# Patient Record
Sex: FEMALE | Race: WHITE | Employment: OTHER | URBAN - METROPOLITAN AREA
[De-identification: names, ages, dates, MRNs, and addresses within clinical notes are randomized per-mention and may not be internally consistent; named-entity substitution may affect disease eponyms.]

---

## 2017-01-01 ENCOUNTER — APPOINTMENT (OUTPATIENT)
Dept: GENERAL RADIOLOGY | Age: 82
DRG: 066 | End: 2017-01-01
Attending: EMERGENCY MEDICINE
Payer: MEDICARE

## 2017-01-01 ENCOUNTER — HOSPITAL ENCOUNTER (INPATIENT)
Age: 82
LOS: 3 days | Discharge: NURSING, REHAB OR LONG-TERM CARE | DRG: 066 | End: 2017-09-06
Attending: EMERGENCY MEDICINE | Admitting: HOSPITALIST
Payer: MEDICARE

## 2017-01-01 ENCOUNTER — PATIENT OUTREACH (OUTPATIENT)
Dept: CASE MANAGEMENT | Age: 82
End: 2017-01-01

## 2017-01-01 ENCOUNTER — PATIENT OUTREACH (OUTPATIENT)
Dept: FAMILY MEDICINE CLINIC | Age: 82
End: 2017-01-01

## 2017-01-01 ENCOUNTER — OFFICE VISIT (OUTPATIENT)
Dept: FAMILY MEDICINE CLINIC | Age: 82
End: 2017-01-01

## 2017-01-01 ENCOUNTER — HOSPITAL ENCOUNTER (OUTPATIENT)
Dept: LAB | Age: 82
Discharge: HOME | End: 2017-07-20
Payer: MEDICARE

## 2017-01-01 ENCOUNTER — HOSPITAL ENCOUNTER (OUTPATIENT)
Dept: PHYSICAL THERAPY | Age: 82
Discharge: HOME | End: 2017-09-12
Attending: INTERNAL MEDICINE
Payer: MEDICARE

## 2017-01-01 ENCOUNTER — APPOINTMENT (OUTPATIENT)
Dept: MRI IMAGING | Age: 82
DRG: 066 | End: 2017-01-01
Attending: HOSPITALIST
Payer: MEDICARE

## 2017-01-01 ENCOUNTER — APPOINTMENT (OUTPATIENT)
Dept: GENERAL RADIOLOGY | Age: 82
End: 2017-01-01
Attending: INTERNAL MEDICINE
Payer: MEDICARE

## 2017-01-01 ENCOUNTER — HOSPITAL ENCOUNTER (OUTPATIENT)
Dept: LAB | Age: 82
Discharge: HOME | End: 2017-07-21
Payer: MEDICARE

## 2017-01-01 ENCOUNTER — CLINICAL SUPPORT (OUTPATIENT)
Dept: FAMILY MEDICINE CLINIC | Age: 82
End: 2017-01-01

## 2017-01-01 ENCOUNTER — APPOINTMENT (OUTPATIENT)
Dept: CT IMAGING | Age: 82
DRG: 066 | End: 2017-01-01
Attending: EMERGENCY MEDICINE
Payer: MEDICARE

## 2017-01-01 ENCOUNTER — HOSPITAL ENCOUNTER (INPATIENT)
Age: 82
LOS: 16 days | Discharge: NURSING, REHAB OR LONG-TERM CARE | End: 2017-09-22
Attending: INTERNAL MEDICINE | Admitting: INTERNAL MEDICINE
Payer: MEDICARE

## 2017-01-01 ENCOUNTER — TELEPHONE (OUTPATIENT)
Dept: FAMILY MEDICINE CLINIC | Age: 82
End: 2017-01-01

## 2017-01-01 VITALS
TEMPERATURE: 95.9 F | WEIGHT: 160.2 LBS | HEIGHT: 65 IN | SYSTOLIC BLOOD PRESSURE: 143 MMHG | OXYGEN SATURATION: 95 % | BODY MASS INDEX: 26.69 KG/M2 | DIASTOLIC BLOOD PRESSURE: 69 MMHG | HEART RATE: 72 BPM | RESPIRATION RATE: 18 BRPM

## 2017-01-01 VITALS
HEART RATE: 81 BPM | RESPIRATION RATE: 16 BRPM | OXYGEN SATURATION: 95 % | DIASTOLIC BLOOD PRESSURE: 75 MMHG | WEIGHT: 157.4 LBS | BODY MASS INDEX: 26.23 KG/M2 | SYSTOLIC BLOOD PRESSURE: 155 MMHG | TEMPERATURE: 95.9 F | HEIGHT: 65 IN

## 2017-01-01 VITALS
HEIGHT: 64 IN | SYSTOLIC BLOOD PRESSURE: 121 MMHG | DIASTOLIC BLOOD PRESSURE: 56 MMHG | BODY MASS INDEX: 27.66 KG/M2 | HEART RATE: 71 BPM | RESPIRATION RATE: 15 BRPM | WEIGHT: 162 LBS | OXYGEN SATURATION: 92 % | TEMPERATURE: 98.8 F

## 2017-01-01 VITALS
RESPIRATION RATE: 16 BRPM | DIASTOLIC BLOOD PRESSURE: 63 MMHG | HEIGHT: 64 IN | BODY MASS INDEX: 26.8 KG/M2 | OXYGEN SATURATION: 94 % | TEMPERATURE: 97.9 F | WEIGHT: 157 LBS | HEART RATE: 74 BPM | SYSTOLIC BLOOD PRESSURE: 134 MMHG

## 2017-01-01 DIAGNOSIS — R53.81 DEBILITY: ICD-10-CM

## 2017-01-01 DIAGNOSIS — E78.00 PURE HYPERCHOLESTEROLEMIA: ICD-10-CM

## 2017-01-01 DIAGNOSIS — Z11.1 ENCOUNTER FOR PPD SKIN TEST READING: Primary | ICD-10-CM

## 2017-01-01 DIAGNOSIS — F01.50 VASCULAR DEMENTIA WITHOUT BEHAVIORAL DISTURBANCE (HCC): ICD-10-CM

## 2017-01-01 DIAGNOSIS — Z00.00 ROUTINE GENERAL MEDICAL EXAMINATION AT A HEALTH CARE FACILITY: Primary | ICD-10-CM

## 2017-01-01 DIAGNOSIS — F01.50 VASCULAR DEMENTIA WITHOUT BEHAVIORAL DISTURBANCE (HCC): Primary | ICD-10-CM

## 2017-01-01 DIAGNOSIS — I10 ESSENTIAL HYPERTENSION: ICD-10-CM

## 2017-01-01 DIAGNOSIS — I63.331 CEREBROVASCULAR ACCIDENT (CVA) DUE TO THROMBOSIS OF RIGHT POSTERIOR CEREBRAL ARTERY (HCC): Primary | ICD-10-CM

## 2017-01-01 DIAGNOSIS — R41.82 ALTERED MENTAL STATUS, UNSPECIFIED ALTERED MENTAL STATUS TYPE: ICD-10-CM

## 2017-01-01 DIAGNOSIS — I63.9 CEREBROVASCULAR ACCIDENT (CVA), UNSPECIFIED MECHANISM (HCC): ICD-10-CM

## 2017-01-01 DIAGNOSIS — K21.00 GASTROESOPHAGEAL REFLUX DISEASE WITH ESOPHAGITIS: ICD-10-CM

## 2017-01-01 DIAGNOSIS — N18.30 CKD (CHRONIC KIDNEY DISEASE) STAGE 3, GFR 30-59 ML/MIN (HCC): ICD-10-CM

## 2017-01-01 LAB
ABO + RH BLD: NORMAL
ALBUMIN SERPL BCP-MCNC: 3.2 G/DL (ref 3.4–5)
ALBUMIN SERPL-MCNC: 3 G/DL (ref 3.4–5)
ALBUMIN/GLOB SERPL: 0.8 {RATIO} (ref 0.8–1.7)
ALBUMIN/GLOB SERPL: 0.8 {RATIO} (ref 0.8–1.7)
ALP SERPL-CCNC: 79 U/L (ref 45–117)
ALP SERPL-CCNC: 82 U/L (ref 45–117)
ALT SERPL-CCNC: 21 U/L (ref 13–56)
ALT SERPL-CCNC: 21 U/L (ref 13–56)
AMPHET UR QL SCN: NEGATIVE
ANION GAP BLD CALC-SCNC: 13 MMOL/L (ref 10–20)
ANION GAP BLD CALC-SCNC: 6 MMOL/L (ref 3–18)
ANION GAP SERPL CALC-SCNC: 12 MMOL/L (ref 3–18)
ANION GAP SERPL CALC-SCNC: 12 MMOL/L (ref 3–18)
ANION GAP SERPL CALC-SCNC: 13 MMOL/L (ref 3–18)
ANION GAP SERPL CALC-SCNC: 14 MMOL/L (ref 3–18)
ANION GAP SERPL CALC-SCNC: 15 MMOL/L (ref 3–18)
ANION GAP SERPL CALC-SCNC: 8 MMOL/L (ref 3–18)
APPEARANCE UR: ABNORMAL
APPEARANCE UR: CLEAR
ASPIRIN TEST, ASPIRN: 350 ARU
AST SERPL W P-5'-P-CCNC: 17 U/L (ref 15–37)
AST SERPL-CCNC: 18 U/L (ref 15–37)
ATRIAL RATE: 63 BPM
BACTERIA URNS QL MICRO: ABNORMAL /HPF
BACTERIA URNS QL MICRO: ABNORMAL /HPF
BARBITURATES UR QL SCN: NEGATIVE
BASOPHILS # BLD AUTO: 0 K/UL (ref 0–0.06)
BASOPHILS # BLD: 0 K/UL (ref 0–0.06)
BASOPHILS # BLD: 0 K/UL (ref 0–0.06)
BASOPHILS # BLD: 1 % (ref 0–2)
BASOPHILS NFR BLD: 0 % (ref 0–2)
BASOPHILS NFR BLD: 0 % (ref 0–2)
BENZODIAZ UR QL: NEGATIVE
BILIRUB SERPL-MCNC: 0.3 MG/DL (ref 0.2–1)
BILIRUB SERPL-MCNC: 0.4 MG/DL (ref 0.2–1)
BILIRUB UR QL: NEGATIVE
BILIRUB UR QL: NEGATIVE
BLOOD GROUP ANTIBODIES SERPL: NORMAL
BUN BLD-MCNC: 31 MG/DL (ref 7–18)
BUN SERPL-MCNC: 27 MG/DL (ref 7–18)
BUN SERPL-MCNC: 29 MG/DL (ref 7–18)
BUN SERPL-MCNC: 35 MG/DL (ref 7–18)
BUN SERPL-MCNC: 37 MG/DL (ref 7–18)
BUN SERPL-MCNC: 39 MG/DL (ref 7–18)
BUN SERPL-MCNC: 51 MG/DL (ref 7–18)
BUN SERPL-MCNC: 57 MG/DL (ref 7–18)
BUN SERPL-MCNC: 59 MG/DL (ref 7–18)
BUN/CREAT SERPL: 17 (ref 12–20)
BUN/CREAT SERPL: 20 (ref 12–20)
BUN/CREAT SERPL: 21 (ref 12–20)
BUN/CREAT SERPL: 22 (ref 12–20)
BUN/CREAT SERPL: 22 (ref 12–20)
BUN/CREAT SERPL: 24 (ref 12–20)
BUN/CREAT SERPL: 24 (ref 12–20)
BUN/CREAT SERPL: 27 (ref 12–20)
BUN/CREAT SERPL: 30 (ref 12–20)
BUN/CREAT SERPL: 30 (ref 12–20)
CA-I BLD-MCNC: 1.06 MMOL/L (ref 1.12–1.32)
CALCIUM SERPL-MCNC: 7.6 MG/DL (ref 8.5–10.1)
CALCIUM SERPL-MCNC: 7.6 MG/DL (ref 8.5–10.1)
CALCIUM SERPL-MCNC: 7.7 MG/DL (ref 8.5–10.1)
CALCIUM SERPL-MCNC: 7.9 MG/DL (ref 8.5–10.1)
CALCIUM SERPL-MCNC: 8 MG/DL (ref 8.5–10.1)
CALCIUM SERPL-MCNC: 8.3 MG/DL (ref 8.5–10.1)
CALCIUM SERPL-MCNC: 8.3 MG/DL (ref 8.5–10.1)
CALCIUM SERPL-MCNC: 8.4 MG/DL (ref 8.5–10.1)
CALCIUM SERPL-MCNC: 8.6 MG/DL (ref 8.5–10.1)
CALCIUM SERPL-MCNC: 9 MG/DL (ref 8.5–10.1)
CALCULATED P AXIS, ECG09: 68 DEGREES
CALCULATED R AXIS, ECG10: 6 DEGREES
CALCULATED T AXIS, ECG11: 80 DEGREES
CANNABINOIDS UR QL SCN: NEGATIVE
CHLORIDE BLD-SCNC: 105 MMOL/L (ref 100–108)
CHLORIDE SERPL-SCNC: 101 MMOL/L (ref 100–108)
CHLORIDE SERPL-SCNC: 104 MMOL/L (ref 100–108)
CHLORIDE SERPL-SCNC: 105 MMOL/L (ref 100–108)
CHLORIDE SERPL-SCNC: 105 MMOL/L (ref 100–108)
CHLORIDE SERPL-SCNC: 106 MMOL/L (ref 100–108)
CHLORIDE SERPL-SCNC: 106 MMOL/L (ref 100–108)
CHLORIDE SERPL-SCNC: 107 MMOL/L (ref 100–108)
CHLORIDE SERPL-SCNC: 107 MMOL/L (ref 100–108)
CHLORIDE SERPL-SCNC: 110 MMOL/L (ref 100–108)
CHLORIDE SERPL-SCNC: 99 MMOL/L (ref 100–108)
CHOLEST SERPL-MCNC: 244 MG/DL
CHOLEST SERPL-MCNC: 251 MG/DL
CK MB CFR SERPL CALC: NORMAL % (ref 0–4)
CK MB SERPL-MCNC: <1 NG/ML (ref 5–25)
CK SERPL-CCNC: 89 U/L (ref 26–192)
CO2 BLD-SCNC: 27 MMOL/L (ref 19–24)
CO2 SERPL-SCNC: 20 MMOL/L (ref 21–32)
CO2 SERPL-SCNC: 20 MMOL/L (ref 21–32)
CO2 SERPL-SCNC: 21 MMOL/L (ref 21–32)
CO2 SERPL-SCNC: 22 MMOL/L (ref 21–32)
CO2 SERPL-SCNC: 23 MMOL/L (ref 21–32)
CO2 SERPL-SCNC: 23 MMOL/L (ref 21–32)
CO2 SERPL-SCNC: 25 MMOL/L (ref 21–32)
CO2 SERPL-SCNC: 25 MMOL/L (ref 21–32)
CO2 SERPL-SCNC: 26 MMOL/L (ref 21–32)
CO2 SERPL-SCNC: 28 MMOL/L (ref 21–32)
COCAINE UR QL SCN: NEGATIVE
COLOR UR: YELLOW
COLOR UR: YELLOW
CREAT SERPL-MCNC: 1.16 MG/DL (ref 0.6–1.3)
CREAT SERPL-MCNC: 1.21 MG/DL (ref 0.6–1.3)
CREAT SERPL-MCNC: 1.22 MG/DL (ref 0.6–1.3)
CREAT SERPL-MCNC: 1.25 MG/DL (ref 0.6–1.3)
CREAT SERPL-MCNC: 1.28 MG/DL (ref 0.6–1.3)
CREAT SERPL-MCNC: 1.31 MG/DL (ref 0.6–1.3)
CREAT SERPL-MCNC: 1.39 MG/DL (ref 0.6–1.3)
CREAT SERPL-MCNC: 2.13 MG/DL (ref 0.6–1.3)
CREAT SERPL-MCNC: 2.97 MG/DL (ref 0.6–1.3)
CREAT SERPL-MCNC: 3.4 MG/DL (ref 0.6–1.3)
CREAT UR-MCNC: 1.3 MG/DL (ref 0.6–1.3)
DIAGNOSIS, 93000: NORMAL
DIFFERENTIAL METHOD BLD: ABNORMAL
EOSINOPHIL # BLD: 0.2 K/UL (ref 0–0.4)
EOSINOPHIL # BLD: 0.2 K/UL (ref 0–0.4)
EOSINOPHIL # BLD: 0.3 K/UL (ref 0–0.4)
EOSINOPHIL NFR BLD: 1 % (ref 0–5)
EOSINOPHIL NFR BLD: 2 % (ref 0–5)
EOSINOPHIL NFR BLD: 4 % (ref 0–5)
EPITH CASTS URNS QL MICRO: ABNORMAL /LPF (ref 0–5)
EPITH CASTS URNS QL MICRO: ABNORMAL /LPF (ref 0–5)
ERYTHROCYTE [DISTWIDTH] IN BLOOD BY AUTOMATED COUNT: 13 % (ref 11.6–14.5)
ERYTHROCYTE [DISTWIDTH] IN BLOOD BY AUTOMATED COUNT: 13.1 % (ref 11.6–14.5)
ERYTHROCYTE [DISTWIDTH] IN BLOOD BY AUTOMATED COUNT: 13.3 % (ref 11.6–14.5)
ERYTHROCYTE [DISTWIDTH] IN BLOOD BY AUTOMATED COUNT: 13.4 % (ref 11.6–14.5)
EST. AVERAGE GLUCOSE BLD GHB EST-MCNC: 126 MG/DL
FIBRINOGEN PPP-MCNC: 231 MG/DL (ref 210–451)
GLOBULIN SER CALC-MCNC: 3.8 G/DL (ref 2–4)
GLOBULIN SER CALC-MCNC: 4 G/DL (ref 2–4)
GLUCOSE BLD STRIP.AUTO-MCNC: 105 MG/DL (ref 70–110)
GLUCOSE BLD STRIP.AUTO-MCNC: 109 MG/DL (ref 70–110)
GLUCOSE BLD STRIP.AUTO-MCNC: 110 MG/DL (ref 70–110)
GLUCOSE BLD STRIP.AUTO-MCNC: 110 MG/DL (ref 70–110)
GLUCOSE BLD STRIP.AUTO-MCNC: 112 MG/DL (ref 70–110)
GLUCOSE BLD STRIP.AUTO-MCNC: 114 MG/DL (ref 70–110)
GLUCOSE BLD STRIP.AUTO-MCNC: 115 MG/DL (ref 70–110)
GLUCOSE BLD STRIP.AUTO-MCNC: 116 MG/DL (ref 70–110)
GLUCOSE BLD STRIP.AUTO-MCNC: 118 MG/DL (ref 70–110)
GLUCOSE BLD STRIP.AUTO-MCNC: 120 MG/DL (ref 74–106)
GLUCOSE BLD STRIP.AUTO-MCNC: 124 MG/DL (ref 70–110)
GLUCOSE BLD STRIP.AUTO-MCNC: 124 MG/DL (ref 70–110)
GLUCOSE BLD STRIP.AUTO-MCNC: 125 MG/DL (ref 70–110)
GLUCOSE BLD STRIP.AUTO-MCNC: 131 MG/DL (ref 70–110)
GLUCOSE BLD STRIP.AUTO-MCNC: 132 MG/DL (ref 70–110)
GLUCOSE BLD STRIP.AUTO-MCNC: 133 MG/DL (ref 70–110)
GLUCOSE BLD STRIP.AUTO-MCNC: 135 MG/DL (ref 70–110)
GLUCOSE BLD STRIP.AUTO-MCNC: 137 MG/DL (ref 70–110)
GLUCOSE BLD STRIP.AUTO-MCNC: 137 MG/DL (ref 70–110)
GLUCOSE BLD STRIP.AUTO-MCNC: 138 MG/DL (ref 70–110)
GLUCOSE BLD STRIP.AUTO-MCNC: 159 MG/DL (ref 70–110)
GLUCOSE BLD STRIP.AUTO-MCNC: 94 MG/DL (ref 70–110)
GLUCOSE SERPL-MCNC: 101 MG/DL (ref 74–99)
GLUCOSE SERPL-MCNC: 104 MG/DL (ref 74–99)
GLUCOSE SERPL-MCNC: 105 MG/DL (ref 74–99)
GLUCOSE SERPL-MCNC: 110 MG/DL (ref 74–99)
GLUCOSE SERPL-MCNC: 114 MG/DL (ref 74–99)
GLUCOSE SERPL-MCNC: 120 MG/DL (ref 74–99)
GLUCOSE SERPL-MCNC: 126 MG/DL (ref 74–99)
GLUCOSE SERPL-MCNC: 127 MG/DL (ref 74–99)
GLUCOSE SERPL-MCNC: 129 MG/DL (ref 74–99)
GLUCOSE SERPL-MCNC: 93 MG/DL (ref 74–99)
GLUCOSE UR STRIP.AUTO-MCNC: NEGATIVE MG/DL
GLUCOSE UR STRIP.AUTO-MCNC: NEGATIVE MG/DL
HBA1C MFR BLD: 6 % (ref 4.2–5.6)
HCT VFR BLD AUTO: 34.6 % (ref 35–45)
HCT VFR BLD AUTO: 36.2 % (ref 35–45)
HCT VFR BLD AUTO: 36.8 % (ref 35–45)
HCT VFR BLD AUTO: 38.1 % (ref 35–45)
HCT VFR BLD CALC: 37 % (ref 36–49)
HDLC SERPL-MCNC: 42 MG/DL (ref 40–60)
HDLC SERPL-MCNC: 46 MG/DL (ref 40–60)
HDLC SERPL: 5.3 {RATIO} (ref 0–5)
HDLC SERPL: 6 {RATIO} (ref 0–5)
HDSCOM,HDSCOM: NORMAL
HGB BLD-MCNC: 11.5 G/DL (ref 12–16)
HGB BLD-MCNC: 11.6 G/DL (ref 12–16)
HGB BLD-MCNC: 11.9 G/DL (ref 12–16)
HGB BLD-MCNC: 12.6 G/DL (ref 12–16)
HGB BLD-MCNC: 13 G/DL (ref 12–16)
HGB UR QL STRIP: NEGATIVE
HGB UR QL STRIP: NEGATIVE
INR PPP: 1.2 (ref 0.8–1.2)
KETONES UR QL STRIP.AUTO: NEGATIVE MG/DL
KETONES UR QL STRIP.AUTO: NEGATIVE MG/DL
LDLC SERPL CALC-MCNC: 149.8 MG/DL (ref 0–100)
LDLC SERPL CALC-MCNC: 151.4 MG/DL (ref 0–100)
LEUKOCYTE ESTERASE UR QL STRIP.AUTO: ABNORMAL
LEUKOCYTE ESTERASE UR QL STRIP.AUTO: ABNORMAL
LIPID PROFILE,FLP: ABNORMAL
LIPID PROFILE,FLP: ABNORMAL
LYMPHOCYTES # BLD AUTO: 35 % (ref 21–52)
LYMPHOCYTES # BLD: 1.9 K/UL (ref 0.9–3.6)
LYMPHOCYTES # BLD: 2 K/UL (ref 0.9–3.6)
LYMPHOCYTES # BLD: 2.7 K/UL (ref 0.9–3.6)
LYMPHOCYTES NFR BLD: 16 % (ref 21–52)
LYMPHOCYTES NFR BLD: 17 % (ref 21–52)
MCH RBC QN AUTO: 28.8 PG (ref 24–34)
MCH RBC QN AUTO: 29.2 PG (ref 24–34)
MCH RBC QN AUTO: 29.7 PG (ref 24–34)
MCH RBC QN AUTO: 29.8 PG (ref 24–34)
MCHC RBC AUTO-ENTMCNC: 31.8 G/DL (ref 31–37)
MCHC RBC AUTO-ENTMCNC: 32.3 G/DL (ref 31–37)
MCHC RBC AUTO-ENTMCNC: 33.5 G/DL (ref 31–37)
MCHC RBC AUTO-ENTMCNC: 34.1 G/DL (ref 31–37)
MCV RBC AUTO: 87.4 FL (ref 74–97)
MCV RBC AUTO: 88.7 FL (ref 74–97)
MCV RBC AUTO: 90.4 FL (ref 74–97)
MCV RBC AUTO: 90.7 FL (ref 74–97)
METHADONE UR QL: NEGATIVE
MM INDURATION POC: 0 MM (ref 0–5)
MONOCYTES # BLD: 0.5 K/UL (ref 0.05–1.2)
MONOCYTES # BLD: 0.5 K/UL (ref 0.05–1.2)
MONOCYTES # BLD: 1 K/UL (ref 0.05–1.2)
MONOCYTES NFR BLD AUTO: 6 % (ref 3–10)
MONOCYTES NFR BLD: 5 % (ref 3–10)
MONOCYTES NFR BLD: 9 % (ref 3–10)
NEUTS SEG # BLD: 4.2 K/UL (ref 1.8–8)
NEUTS SEG # BLD: 8.4 K/UL (ref 1.8–8)
NEUTS SEG # BLD: 8.8 K/UL (ref 1.8–8)
NEUTS SEG NFR BLD AUTO: 54 % (ref 40–73)
NEUTS SEG NFR BLD: 73 % (ref 40–73)
NEUTS SEG NFR BLD: 77 % (ref 40–73)
NITRITE UR QL STRIP.AUTO: NEGATIVE
NITRITE UR QL STRIP.AUTO: POSITIVE
OPIATES UR QL: NEGATIVE
P-R INTERVAL, ECG05: 196 MS
P2Y12 PLT RESPONSE,PPPR: 326 PRU (ref 194–418)
PCP UR QL: NEGATIVE
PH UR STRIP: 5 [PH] (ref 5–8)
PH UR STRIP: 5.5 [PH] (ref 5–8)
PLATELET # BLD AUTO: 242 K/UL (ref 135–420)
PLATELET # BLD AUTO: 274 K/UL (ref 135–420)
PLATELET # BLD AUTO: 282 K/UL (ref 135–420)
PLATELET # BLD AUTO: 388 K/UL (ref 135–420)
PMV BLD AUTO: 9 FL (ref 9.2–11.8)
PMV BLD AUTO: 9.4 FL (ref 9.2–11.8)
PMV BLD AUTO: 9.7 FL (ref 9.2–11.8)
PMV BLD AUTO: 9.9 FL (ref 9.2–11.8)
POTASSIUM BLD-SCNC: 4 MMOL/L (ref 3.5–5.5)
POTASSIUM SERPL-SCNC: 3.7 MMOL/L (ref 3.5–5.5)
POTASSIUM SERPL-SCNC: 3.7 MMOL/L (ref 3.5–5.5)
POTASSIUM SERPL-SCNC: 3.8 MMOL/L (ref 3.5–5.5)
POTASSIUM SERPL-SCNC: 3.9 MMOL/L (ref 3.5–5.5)
POTASSIUM SERPL-SCNC: 4 MMOL/L (ref 3.5–5.5)
POTASSIUM SERPL-SCNC: 4 MMOL/L (ref 3.5–5.5)
POTASSIUM SERPL-SCNC: 4.5 MMOL/L (ref 3.5–5.5)
POTASSIUM SERPL-SCNC: 4.5 MMOL/L (ref 3.5–5.5)
POTASSIUM SERPL-SCNC: 4.7 MMOL/L (ref 3.5–5.5)
POTASSIUM SERPL-SCNC: 5.2 MMOL/L (ref 3.5–5.5)
PPD POC: NEGATIVE NEGATIVE
PROT SERPL-MCNC: 6.8 G/DL (ref 6.4–8.2)
PROT SERPL-MCNC: 7.2 G/DL (ref 6.4–8.2)
PROT UR STRIP-MCNC: NEGATIVE MG/DL
PROT UR STRIP-MCNC: NEGATIVE MG/DL
PROTHROMBIN TIME: 14.7 SEC (ref 11.5–15.2)
Q-T INTERVAL, ECG07: 464 MS
QRS DURATION, ECG06: 80 MS
QTC CALCULATION (BEZET), ECG08: 474 MS
RBC # BLD AUTO: 3.9 M/UL (ref 4.2–5.3)
RBC # BLD AUTO: 3.99 M/UL (ref 4.2–5.3)
RBC # BLD AUTO: 4.07 M/UL (ref 4.2–5.3)
RBC # BLD AUTO: 4.36 M/UL (ref 4.2–5.3)
RBC #/AREA URNS HPF: ABNORMAL /HPF (ref 0–5)
RBC #/AREA URNS HPF: ABNORMAL /HPF (ref 0–5)
SODIUM BLD-SCNC: 140 MMOL/L (ref 136–145)
SODIUM SERPL-SCNC: 134 MMOL/L (ref 136–145)
SODIUM SERPL-SCNC: 136 MMOL/L (ref 136–145)
SODIUM SERPL-SCNC: 137 MMOL/L (ref 136–145)
SODIUM SERPL-SCNC: 137 MMOL/L (ref 136–145)
SODIUM SERPL-SCNC: 138 MMOL/L (ref 136–145)
SODIUM SERPL-SCNC: 139 MMOL/L (ref 136–145)
SODIUM SERPL-SCNC: 140 MMOL/L (ref 136–145)
SODIUM SERPL-SCNC: 141 MMOL/L (ref 136–145)
SODIUM SERPL-SCNC: 142 MMOL/L (ref 136–145)
SODIUM SERPL-SCNC: 143 MMOL/L (ref 136–145)
SP GR UR REFRACTOMETRY: 1.02 (ref 1–1.03)
SP GR UR REFRACTOMETRY: 1.02 (ref 1–1.03)
SPECIMEN EXP DATE BLD: NORMAL
THROMBIN TIME: 15.7 SECS (ref 13.8–18.2)
TRIGL SERPL-MCNC: 233 MG/DL (ref ?–150)
TRIGL SERPL-MCNC: 296 MG/DL (ref ?–150)
TROPONIN I SERPL-MCNC: <0.02 NG/ML (ref 0–0.04)
TSH SERPL DL<=0.05 MIU/L-ACNC: 2.23 UIU/ML (ref 0.36–3.74)
UROBILINOGEN UR QL STRIP.AUTO: 0.2 EU/DL (ref 0.2–1)
UROBILINOGEN UR QL STRIP.AUTO: 0.2 EU/DL (ref 0.2–1)
VENTRICULAR RATE, ECG03: 63 BPM
VLDLC SERPL CALC-MCNC: 46.6 MG/DL
VLDLC SERPL CALC-MCNC: 59.2 MG/DL
WBC # BLD AUTO: 11.5 K/UL (ref 4.6–13.2)
WBC # BLD AUTO: 11.6 K/UL (ref 4.6–13.2)
WBC # BLD AUTO: 7.2 K/UL (ref 4.6–13.2)
WBC # BLD AUTO: 7.7 K/UL (ref 4.6–13.2)
WBC URNS QL MICRO: ABNORMAL /HPF (ref 0–4)
WBC URNS QL MICRO: ABNORMAL /HPF (ref 0–4)

## 2017-01-01 PROCEDURE — 36415 COLL VENOUS BLD VENIPUNCTURE: CPT | Performed by: INTERNAL MEDICINE

## 2017-01-01 PROCEDURE — 77030020257 HC SOL INJ SOD CL 0.45% 1000ML BG

## 2017-01-01 PROCEDURE — 74011250637 HC RX REV CODE- 250/637: Performed by: INTERNAL MEDICINE

## 2017-01-01 PROCEDURE — 80047 BASIC METABLC PNL IONIZED CA: CPT

## 2017-01-01 PROCEDURE — 93306 TTE W/DOPPLER COMPLETE: CPT

## 2017-01-01 PROCEDURE — 80053 COMPREHEN METABOLIC PANEL: CPT | Performed by: EMERGENCY MEDICINE

## 2017-01-01 PROCEDURE — 80048 BASIC METABOLIC PNL TOTAL CA: CPT | Performed by: INTERNAL MEDICINE

## 2017-01-01 PROCEDURE — 82550 ASSAY OF CK (CPK): CPT | Performed by: EMERGENCY MEDICINE

## 2017-01-01 PROCEDURE — 74011250636 HC RX REV CODE- 250/636: Performed by: INTERNAL MEDICINE

## 2017-01-01 PROCEDURE — 74011250636 HC RX REV CODE- 250/636: Performed by: HOSPITALIST

## 2017-01-01 PROCEDURE — 92611 MOTION FLUOROSCOPY/SWALLOW: CPT

## 2017-01-01 PROCEDURE — 97116 GAIT TRAINING THERAPY: CPT

## 2017-01-01 PROCEDURE — 80061 LIPID PANEL: CPT | Performed by: HOSPITALIST

## 2017-01-01 PROCEDURE — 97165 OT EVAL LOW COMPLEX 30 MIN: CPT

## 2017-01-01 PROCEDURE — 85027 COMPLETE CBC AUTOMATED: CPT | Performed by: EMERGENCY MEDICINE

## 2017-01-01 PROCEDURE — 97530 THERAPEUTIC ACTIVITIES: CPT

## 2017-01-01 PROCEDURE — 94761 N-INVAS EAR/PLS OXIMETRY MLT: CPT

## 2017-01-01 PROCEDURE — 86900 BLOOD TYPING SEROLOGIC ABO: CPT | Performed by: EMERGENCY MEDICINE

## 2017-01-01 PROCEDURE — 82962 GLUCOSE BLOOD TEST: CPT

## 2017-01-01 PROCEDURE — 93880 EXTRACRANIAL BILAT STUDY: CPT

## 2017-01-01 PROCEDURE — 36415 COLL VENOUS BLD VENIPUNCTURE: CPT | Performed by: FAMILY MEDICINE

## 2017-01-01 PROCEDURE — 85610 PROTHROMBIN TIME: CPT | Performed by: EMERGENCY MEDICINE

## 2017-01-01 PROCEDURE — 74011250637 HC RX REV CODE- 250/637: Performed by: EMERGENCY MEDICINE

## 2017-01-01 PROCEDURE — 93005 ELECTROCARDIOGRAM TRACING: CPT

## 2017-01-01 PROCEDURE — 85576 BLOOD PLATELET AGGREGATION: CPT | Performed by: EMERGENCY MEDICINE

## 2017-01-01 PROCEDURE — 99285 EMERGENCY DEPT VISIT HI MDM: CPT

## 2017-01-01 PROCEDURE — 65660000000 HC RM CCU STEPDOWN

## 2017-01-01 PROCEDURE — 97535 SELF CARE MNGMENT TRAINING: CPT

## 2017-01-01 PROCEDURE — 65270000029 HC RM PRIVATE

## 2017-01-01 PROCEDURE — 85025 COMPLETE CBC W/AUTO DIFF WBC: CPT | Performed by: FAMILY MEDICINE

## 2017-01-01 PROCEDURE — 81001 URINALYSIS AUTO W/SCOPE: CPT | Performed by: FAMILY MEDICINE

## 2017-01-01 PROCEDURE — 80061 LIPID PANEL: CPT | Performed by: FAMILY MEDICINE

## 2017-01-01 PROCEDURE — 74011000258 HC RX REV CODE- 258: Performed by: INTERNAL MEDICINE

## 2017-01-01 PROCEDURE — G8997 SWALLOW GOAL STATUS: HCPCS

## 2017-01-01 PROCEDURE — 74011000255 HC RX REV CODE- 255: Performed by: INTERNAL MEDICINE

## 2017-01-01 PROCEDURE — 76450000000

## 2017-01-01 PROCEDURE — G8998 SWALLOW D/C STATUS: HCPCS

## 2017-01-01 PROCEDURE — 74011250637 HC RX REV CODE- 250/637: Performed by: HOSPITALIST

## 2017-01-01 PROCEDURE — 74011000258 HC RX REV CODE- 258: Performed by: HOSPITALIST

## 2017-01-01 PROCEDURE — 84443 ASSAY THYROID STIM HORMONE: CPT | Performed by: FAMILY MEDICINE

## 2017-01-01 PROCEDURE — 92610 EVALUATE SWALLOWING FUNCTION: CPT

## 2017-01-01 PROCEDURE — 74230 X-RAY XM SWLNG FUNCJ C+: CPT

## 2017-01-01 PROCEDURE — 85025 COMPLETE CBC W/AUTO DIFF WBC: CPT | Performed by: INTERNAL MEDICINE

## 2017-01-01 PROCEDURE — 81001 URINALYSIS AUTO W/SCOPE: CPT | Performed by: EMERGENCY MEDICINE

## 2017-01-01 PROCEDURE — 70450 CT HEAD/BRAIN W/O DYE: CPT

## 2017-01-01 PROCEDURE — 97162 PT EVAL MOD COMPLEX 30 MIN: CPT

## 2017-01-01 PROCEDURE — 85670 THROMBIN TIME PLASMA: CPT | Performed by: EMERGENCY MEDICINE

## 2017-01-01 PROCEDURE — 70544 MR ANGIOGRAPHY HEAD W/O DYE: CPT

## 2017-01-01 PROCEDURE — 83036 HEMOGLOBIN GLYCOSYLATED A1C: CPT | Performed by: HOSPITALIST

## 2017-01-01 PROCEDURE — 80053 COMPREHEN METABOLIC PANEL: CPT | Performed by: FAMILY MEDICINE

## 2017-01-01 PROCEDURE — 80307 DRUG TEST PRSMV CHEM ANLYZR: CPT | Performed by: EMERGENCY MEDICINE

## 2017-01-01 PROCEDURE — 85384 FIBRINOGEN ACTIVITY: CPT | Performed by: EMERGENCY MEDICINE

## 2017-01-01 PROCEDURE — 51798 US URINE CAPACITY MEASURE: CPT

## 2017-01-01 PROCEDURE — 71010 XR CHEST PORT: CPT

## 2017-01-01 PROCEDURE — 77030020253 HC SOL INJ D545NS .05 DEX .45 SAL

## 2017-01-01 PROCEDURE — G8996 SWALLOW CURRENT STATUS: HCPCS

## 2017-01-01 RX ORDER — ATORVASTATIN CALCIUM 40 MG/1
80 TABLET, FILM COATED ORAL
Status: DISCONTINUED | OUTPATIENT
Start: 2017-01-01 | End: 2017-01-01 | Stop reason: HOSPADM

## 2017-01-01 RX ORDER — METHYLPHENIDATE HYDROCHLORIDE 10 MG/1
5 TABLET ORAL
Status: COMPLETED | OUTPATIENT
Start: 2017-01-01 | End: 2017-01-01

## 2017-01-01 RX ORDER — ASPIRIN 325 MG
325 TABLET ORAL DAILY
Status: DISCONTINUED | OUTPATIENT
Start: 2017-01-01 | End: 2017-01-01 | Stop reason: HOSPADM

## 2017-01-01 RX ORDER — HYDROCHLOROTHIAZIDE 25 MG/1
25 TABLET ORAL DAILY
Qty: 30 TAB | Refills: 0 | Status: SHIPPED
Start: 2017-01-01

## 2017-01-01 RX ORDER — INSULIN LISPRO 100 [IU]/ML
INJECTION, SOLUTION INTRAVENOUS; SUBCUTANEOUS
Qty: 1 VIAL | Refills: 0 | Status: SHIPPED
Start: 2017-01-01

## 2017-01-01 RX ORDER — DEXTROSE 50 % IN WATER (D50W) INTRAVENOUS SYRINGE
25-50 AS NEEDED
Status: DISCONTINUED | OUTPATIENT
Start: 2017-01-01 | End: 2017-01-01 | Stop reason: HOSPADM

## 2017-01-01 RX ORDER — DEXTROSE MONOHYDRATE AND SODIUM CHLORIDE 5; .45 G/100ML; G/100ML
50 INJECTION, SOLUTION INTRAVENOUS CONTINUOUS
Status: DISCONTINUED | OUTPATIENT
Start: 2017-01-01 | End: 2017-01-01

## 2017-01-01 RX ORDER — HYDROCHLOROTHIAZIDE 25 MG/1
25 TABLET ORAL DAILY
Status: DISCONTINUED | OUTPATIENT
Start: 2017-01-01 | End: 2017-01-01

## 2017-01-01 RX ORDER — METHYLPHENIDATE HYDROCHLORIDE 5 MG/1
2.5 TABLET ORAL
Status: DISCONTINUED | OUTPATIENT
Start: 2017-01-01 | End: 2017-01-01

## 2017-01-01 RX ORDER — LISINOPRIL 5 MG/1
5 TABLET ORAL DAILY
Status: DISCONTINUED | OUTPATIENT
Start: 2017-01-01 | End: 2017-01-01

## 2017-01-01 RX ORDER — ATORVASTATIN CALCIUM 80 MG/1
80 TABLET, FILM COATED ORAL
Qty: 30 TAB | Refills: 0 | Status: SHIPPED | OUTPATIENT
Start: 2017-01-01 | End: 2017-01-01

## 2017-01-01 RX ORDER — INSULIN LISPRO 100 [IU]/ML
INJECTION, SOLUTION INTRAVENOUS; SUBCUTANEOUS
Status: DISCONTINUED | OUTPATIENT
Start: 2017-01-01 | End: 2017-01-01 | Stop reason: HOSPADM

## 2017-01-01 RX ORDER — ACETAMINOPHEN 325 MG/1
650 TABLET ORAL
Status: DISCONTINUED | OUTPATIENT
Start: 2017-01-01 | End: 2017-01-01 | Stop reason: HOSPADM

## 2017-01-01 RX ORDER — ASPIRIN 325 MG
325 TABLET ORAL DAILY
Qty: 60 TAB | Refills: 0 | Status: SHIPPED | OUTPATIENT
Start: 2017-01-01

## 2017-01-01 RX ORDER — HYDRALAZINE HYDROCHLORIDE 20 MG/ML
10 INJECTION INTRAMUSCULAR; INTRAVENOUS
Status: DISCONTINUED | OUTPATIENT
Start: 2017-01-01 | End: 2017-01-01 | Stop reason: HOSPADM

## 2017-01-01 RX ORDER — CLOPIDOGREL BISULFATE 75 MG/1
75 TABLET ORAL DAILY
Status: DISCONTINUED | OUTPATIENT
Start: 2017-01-01 | End: 2017-01-01 | Stop reason: HOSPADM

## 2017-01-01 RX ORDER — INSULIN LISPRO 100 [IU]/ML
INJECTION, SOLUTION INTRAVENOUS; SUBCUTANEOUS
Status: DISCONTINUED | OUTPATIENT
Start: 2017-01-01 | End: 2017-01-01

## 2017-01-01 RX ORDER — ATORVASTATIN CALCIUM 80 MG/1
80 TABLET, FILM COATED ORAL
Qty: 60 TAB | Refills: 0 | Status: SHIPPED | OUTPATIENT
Start: 2017-01-01

## 2017-01-01 RX ORDER — ONDANSETRON 2 MG/ML
4 INJECTION INTRAMUSCULAR; INTRAVENOUS
Status: DISCONTINUED | OUTPATIENT
Start: 2017-01-01 | End: 2017-01-01 | Stop reason: HOSPADM

## 2017-01-01 RX ORDER — DEXTROSE MONOHYDRATE AND SODIUM CHLORIDE 5; .45 G/100ML; G/100ML
100 INJECTION, SOLUTION INTRAVENOUS CONTINUOUS
Status: CANCELLED | OUTPATIENT
Start: 2017-01-01

## 2017-01-01 RX ORDER — VALSARTAN AND HYDROCHLOROTHIAZIDE 80; 12.5 MG/1; MG/1
1 TABLET, FILM COATED ORAL DAILY
Qty: 90 TAB | Refills: 4 | Status: SHIPPED | OUTPATIENT
Start: 2017-01-01 | End: 2017-01-01

## 2017-01-01 RX ORDER — CLOPIDOGREL BISULFATE 75 MG/1
75 TABLET ORAL DAILY
Status: DISCONTINUED | OUTPATIENT
Start: 2017-01-01 | End: 2017-01-01

## 2017-01-01 RX ORDER — ASPIRIN 325 MG
325 TABLET ORAL
Status: COMPLETED | OUTPATIENT
Start: 2017-01-01 | End: 2017-01-01

## 2017-01-01 RX ORDER — LISINOPRIL 5 MG/1
5 TABLET ORAL DAILY
Qty: 30 TAB | Refills: 0 | Status: SHIPPED
Start: 2017-01-01

## 2017-01-01 RX ORDER — ENOXAPARIN SODIUM 100 MG/ML
30 INJECTION SUBCUTANEOUS EVERY 24 HOURS
Status: COMPLETED | OUTPATIENT
Start: 2017-01-01 | End: 2017-01-01

## 2017-01-01 RX ORDER — MAGNESIUM SULFATE 100 %
4 CRYSTALS MISCELLANEOUS AS NEEDED
Status: DISCONTINUED | OUTPATIENT
Start: 2017-01-01 | End: 2017-01-01

## 2017-01-01 RX ORDER — HYDROCHLOROTHIAZIDE 25 MG/1
25 TABLET ORAL DAILY
Status: DISCONTINUED | OUTPATIENT
Start: 2017-01-01 | End: 2017-01-01 | Stop reason: HOSPADM

## 2017-01-01 RX ORDER — DEXTROSE MONOHYDRATE AND SODIUM CHLORIDE 5; .45 G/100ML; G/100ML
100 INJECTION, SOLUTION INTRAVENOUS CONTINUOUS
Status: DISCONTINUED | OUTPATIENT
Start: 2017-01-01 | End: 2017-01-01

## 2017-01-01 RX ORDER — LISINOPRIL 5 MG/1
5 TABLET ORAL DAILY
Status: DISCONTINUED | OUTPATIENT
Start: 2017-01-01 | End: 2017-01-01 | Stop reason: HOSPADM

## 2017-01-01 RX ORDER — CLOPIDOGREL BISULFATE 75 MG/1
75 TABLET ORAL DAILY
Qty: 30 TAB | Refills: 2 | Status: SHIPPED
Start: 2017-01-01 | End: 2017-01-01

## 2017-01-01 RX ORDER — AMLODIPINE BESYLATE 10 MG/1
10 TABLET ORAL DAILY
Status: DISCONTINUED | OUTPATIENT
Start: 2017-01-01 | End: 2017-01-01

## 2017-01-01 RX ORDER — ACETAMINOPHEN 325 MG/1
650 TABLET ORAL
Qty: 100 TAB | Refills: 0 | Status: SHIPPED | OUTPATIENT
Start: 2017-01-01

## 2017-01-01 RX ORDER — ACETAMINOPHEN 325 MG/1
650 TABLET ORAL
Status: DISCONTINUED | OUTPATIENT
Start: 2017-01-01 | End: 2017-01-01

## 2017-01-01 RX ORDER — ASPIRIN 325 MG
325 TABLET ORAL DAILY
Status: DISCONTINUED | OUTPATIENT
Start: 2017-01-01 | End: 2017-01-01

## 2017-01-01 RX ORDER — CLOPIDOGREL BISULFATE 75 MG/1
75 TABLET ORAL DAILY
Qty: 60 TAB | Refills: 0 | Status: SHIPPED | OUTPATIENT
Start: 2017-01-01

## 2017-01-01 RX ORDER — ACETAMINOPHEN 650 MG/1
650 SUPPOSITORY RECTAL
Status: DISCONTINUED | OUTPATIENT
Start: 2017-01-01 | End: 2017-01-01 | Stop reason: HOSPADM

## 2017-01-01 RX ORDER — ENOXAPARIN SODIUM 100 MG/ML
30 INJECTION SUBCUTANEOUS EVERY 24 HOURS
Status: DISCONTINUED | OUTPATIENT
Start: 2017-01-01 | End: 2017-01-01 | Stop reason: HOSPADM

## 2017-01-01 RX ORDER — ATORVASTATIN CALCIUM 40 MG/1
80 TABLET, FILM COATED ORAL
Status: DISCONTINUED | OUTPATIENT
Start: 2017-01-01 | End: 2017-01-01

## 2017-01-01 RX ORDER — ASPIRIN 325 MG
325 TABLET ORAL DAILY
Qty: 30 TAB | Refills: 0 | Status: SHIPPED | OUTPATIENT
Start: 2017-01-01 | End: 2017-01-01

## 2017-01-01 RX ORDER — MORPHINE SULFATE 20 MG/ML
2.5 SOLUTION ORAL
Status: DISCONTINUED | OUTPATIENT
Start: 2017-01-01 | End: 2017-01-01 | Stop reason: HOSPADM

## 2017-01-01 RX ORDER — MAGNESIUM SULFATE 100 %
4 CRYSTALS MISCELLANEOUS AS NEEDED
Status: DISCONTINUED | OUTPATIENT
Start: 2017-01-01 | End: 2017-01-01 | Stop reason: HOSPADM

## 2017-01-01 RX ORDER — DEXTROSE 50 % IN WATER (D50W) INTRAVENOUS SYRINGE
25-50 AS NEEDED
Status: DISCONTINUED | OUTPATIENT
Start: 2017-01-01 | End: 2017-01-01

## 2017-01-01 RX ADMIN — ACETAMINOPHEN 650 MG: 650 SOLUTION ORAL at 16:50

## 2017-01-01 RX ADMIN — ACETAMINOPHEN 650 MG: 325 TABLET, FILM COATED ORAL at 21:21

## 2017-01-01 RX ADMIN — ASPIRIN 325 MG ORAL TABLET 325 MG: 325 PILL ORAL at 09:59

## 2017-01-01 RX ADMIN — CLOPIDOGREL BISULFATE 75 MG: 75 TABLET, FILM COATED ORAL at 09:11

## 2017-01-01 RX ADMIN — ATORVASTATIN CALCIUM 80 MG: 40 TABLET, FILM COATED ORAL at 22:19

## 2017-01-01 RX ADMIN — ENOXAPARIN SODIUM 30 MG: 30 INJECTION SUBCUTANEOUS at 18:01

## 2017-01-01 RX ADMIN — ATORVASTATIN CALCIUM 80 MG: 40 TABLET, FILM COATED ORAL at 21:19

## 2017-01-01 RX ADMIN — ASPIRIN 325 MG ORAL TABLET 325 MG: 325 PILL ORAL at 09:12

## 2017-01-01 RX ADMIN — ACETAMINOPHEN 650 MG: 650 SOLUTION ORAL at 16:40

## 2017-01-01 RX ADMIN — ATORVASTATIN CALCIUM 80 MG: 40 TABLET, FILM COATED ORAL at 22:40

## 2017-01-01 RX ADMIN — CLOPIDOGREL BISULFATE 75 MG: 75 TABLET, FILM COATED ORAL at 09:19

## 2017-01-01 RX ADMIN — ACETAMINOPHEN 650 MG: 325 TABLET, FILM COATED ORAL at 18:02

## 2017-01-01 RX ADMIN — ATORVASTATIN CALCIUM 80 MG: 40 TABLET, FILM COATED ORAL at 22:00

## 2017-01-01 RX ADMIN — CLOPIDOGREL BISULFATE 75 MG: 75 TABLET, FILM COATED ORAL at 09:00

## 2017-01-01 RX ADMIN — ACETAMINOPHEN 650 MG: 650 SOLUTION ORAL at 16:21

## 2017-01-01 RX ADMIN — ACETAMINOPHEN 650 MG: 325 TABLET, FILM COATED ORAL at 21:51

## 2017-01-01 RX ADMIN — ATORVASTATIN CALCIUM 80 MG: 40 TABLET, FILM COATED ORAL at 21:24

## 2017-01-01 RX ADMIN — ASPIRIN 325 MG ORAL TABLET 325 MG: 325 PILL ORAL at 09:00

## 2017-01-01 RX ADMIN — ENOXAPARIN SODIUM 30 MG: 30 INJECTION SUBCUTANEOUS at 17:36

## 2017-01-01 RX ADMIN — ACETAMINOPHEN 650 MG: 325 TABLET, FILM COATED ORAL at 16:55

## 2017-01-01 RX ADMIN — ACETAMINOPHEN 650 MG: 325 TABLET, FILM COATED ORAL at 22:07

## 2017-01-01 RX ADMIN — ACETAMINOPHEN 650 MG: 325 TABLET, FILM COATED ORAL at 12:22

## 2017-01-01 RX ADMIN — ATORVASTATIN CALCIUM 80 MG: 40 TABLET, FILM COATED ORAL at 21:09

## 2017-01-01 RX ADMIN — METHYLPHENIDATE HYDROCHLORIDE 5 MG: 10 TABLET ORAL at 10:35

## 2017-01-01 RX ADMIN — CLOPIDOGREL BISULFATE 75 MG: 75 TABLET, FILM COATED ORAL at 10:34

## 2017-01-01 RX ADMIN — ATORVASTATIN CALCIUM 80 MG: 40 TABLET, FILM COATED ORAL at 21:57

## 2017-01-01 RX ADMIN — ATORVASTATIN CALCIUM 80 MG: 40 TABLET, FILM COATED ORAL at 21:21

## 2017-01-01 RX ADMIN — DEXTROSE MONOHYDRATE AND SODIUM CHLORIDE 100 ML/HR: 5; .45 INJECTION, SOLUTION INTRAVENOUS at 18:53

## 2017-01-01 RX ADMIN — DEXTROSE MONOHYDRATE AND SODIUM CHLORIDE 50 ML/HR: 5; .45 INJECTION, SOLUTION INTRAVENOUS at 17:19

## 2017-01-01 RX ADMIN — CLOPIDOGREL BISULFATE 75 MG: 75 TABLET, FILM COATED ORAL at 08:03

## 2017-01-01 RX ADMIN — ENOXAPARIN SODIUM 30 MG: 30 INJECTION SUBCUTANEOUS at 17:32

## 2017-01-01 RX ADMIN — BARIUM SULFATE 30 ML: 0.81 POWDER, FOR SUSPENSION ORAL at 09:50

## 2017-01-01 RX ADMIN — CLOPIDOGREL BISULFATE 75 MG: 75 TABLET ORAL at 09:14

## 2017-01-01 RX ADMIN — ATORVASTATIN CALCIUM 80 MG: 40 TABLET, FILM COATED ORAL at 21:41

## 2017-01-01 RX ADMIN — ASPIRIN 325 MG ORAL TABLET 325 MG: 325 PILL ORAL at 12:15

## 2017-01-01 RX ADMIN — ASPIRIN 325 MG ORAL TABLET 325 MG: 325 PILL ORAL at 09:14

## 2017-01-01 RX ADMIN — ACETAMINOPHEN 650 MG: 325 TABLET, FILM COATED ORAL at 21:24

## 2017-01-01 RX ADMIN — ACETAMINOPHEN 650 MG: 325 TABLET, FILM COATED ORAL at 08:11

## 2017-01-01 RX ADMIN — ATORVASTATIN CALCIUM 80 MG: 40 TABLET, FILM COATED ORAL at 21:05

## 2017-01-01 RX ADMIN — CLOPIDOGREL BISULFATE 75 MG: 75 TABLET, FILM COATED ORAL at 10:16

## 2017-01-01 RX ADMIN — ACETAMINOPHEN 650 MG: 325 TABLET, FILM COATED ORAL at 07:57

## 2017-01-01 RX ADMIN — CLOPIDOGREL BISULFATE 75 MG: 75 TABLET, FILM COATED ORAL at 08:35

## 2017-01-01 RX ADMIN — CLOPIDOGREL BISULFATE 75 MG: 75 TABLET, FILM COATED ORAL at 09:31

## 2017-01-01 RX ADMIN — HYDROCODONE BITARTRATE AND ACETAMINOPHEN 5 MG: 7.5; 325 TABLET ORAL at 09:00

## 2017-01-01 RX ADMIN — ACETAMINOPHEN 650 MG: 325 TABLET, FILM COATED ORAL at 19:33

## 2017-01-01 RX ADMIN — ACETAMINOPHEN 650 MG: 325 TABLET, FILM COATED ORAL at 07:01

## 2017-01-01 RX ADMIN — MORPHINE SULFATE 2.6 MG: 20 SOLUTION ORAL at 16:11

## 2017-01-01 RX ADMIN — ACETAMINOPHEN 650 MG: 650 SOLUTION ORAL at 09:11

## 2017-01-01 RX ADMIN — ENOXAPARIN SODIUM 30 MG: 30 INJECTION SUBCUTANEOUS at 18:03

## 2017-01-01 RX ADMIN — ASPIRIN 325 MG ORAL TABLET 325 MG: 325 PILL ORAL at 10:02

## 2017-01-01 RX ADMIN — ASPIRIN 325 MG ORAL TABLET 325 MG: 325 PILL ORAL at 09:31

## 2017-01-01 RX ADMIN — BARIUM SULFATE 15 ML: 400 PASTE ORAL at 09:50

## 2017-01-01 RX ADMIN — ACETAMINOPHEN 650 MG: 650 SOLUTION ORAL at 17:02

## 2017-01-01 RX ADMIN — ASPIRIN 325 MG ORAL TABLET 325 MG: 325 PILL ORAL at 10:16

## 2017-01-01 RX ADMIN — ACETAMINOPHEN 650 MG: 325 TABLET, FILM COATED ORAL at 10:02

## 2017-01-01 RX ADMIN — CLOPIDOGREL BISULFATE 75 MG: 75 TABLET ORAL at 08:04

## 2017-01-01 RX ADMIN — ASPIRIN 325 MG ORAL TABLET 325 MG: 325 PILL ORAL at 09:18

## 2017-01-01 RX ADMIN — ASPIRIN 325 MG ORAL TABLET 325 MG: 325 PILL ORAL at 08:03

## 2017-01-01 RX ADMIN — ACETAMINOPHEN 650 MG: 325 TABLET, FILM COATED ORAL at 16:32

## 2017-01-01 RX ADMIN — ACETAMINOPHEN 650 MG: 650 SOLUTION ORAL at 12:08

## 2017-01-01 RX ADMIN — LISINOPRIL 5 MG: 5 TABLET ORAL at 09:59

## 2017-01-01 RX ADMIN — ASPIRIN 325 MG ORAL TABLET 325 MG: 325 PILL ORAL at 08:11

## 2017-01-01 RX ADMIN — ASPIRIN 325 MG ORAL TABLET 325 MG: 325 PILL ORAL at 08:13

## 2017-01-01 RX ADMIN — ACETAMINOPHEN 650 MG: 325 TABLET, FILM COATED ORAL at 18:03

## 2017-01-01 RX ADMIN — ATORVASTATIN CALCIUM 80 MG: 40 TABLET, FILM COATED ORAL at 22:43

## 2017-01-01 RX ADMIN — ATORVASTATIN CALCIUM 80 MG: 40 TABLET, FILM COATED ORAL at 22:08

## 2017-01-01 RX ADMIN — HYDROCHLOROTHIAZIDE 25 MG: 25 TABLET ORAL at 09:59

## 2017-01-01 RX ADMIN — LISINOPRIL 5 MG: 5 TABLET ORAL at 13:00

## 2017-01-01 RX ADMIN — ATORVASTATIN CALCIUM 80 MG: 40 TABLET, FILM COATED ORAL at 22:09

## 2017-01-01 RX ADMIN — ASPIRIN 325 MG ORAL TABLET 325 MG: 325 PILL ORAL at 10:35

## 2017-01-01 RX ADMIN — ASPIRIN 325 MG ORAL TABLET 325 MG: 325 PILL ORAL at 08:04

## 2017-01-01 RX ADMIN — ENOXAPARIN SODIUM 30 MG: 30 INJECTION SUBCUTANEOUS at 17:30

## 2017-01-01 RX ADMIN — ENOXAPARIN SODIUM 30 MG: 30 INJECTION SUBCUTANEOUS at 17:24

## 2017-01-01 RX ADMIN — ENOXAPARIN SODIUM 30 MG: 30 INJECTION SUBCUTANEOUS at 17:01

## 2017-01-01 RX ADMIN — ASPIRIN 325 MG ORAL TABLET 325 MG: 325 PILL ORAL at 10:26

## 2017-01-01 RX ADMIN — ACETAMINOPHEN 650 MG: 325 TABLET, FILM COATED ORAL at 21:57

## 2017-01-01 RX ADMIN — DEXTROSE MONOHYDRATE AND SODIUM CHLORIDE 100 ML/HR: 5; .45 INJECTION, SOLUTION INTRAVENOUS at 10:30

## 2017-01-01 RX ADMIN — ATORVASTATIN CALCIUM 80 MG: 40 TABLET, FILM COATED ORAL at 22:20

## 2017-01-01 RX ADMIN — ATORVASTATIN CALCIUM 80 MG: 40 TABLET, FILM COATED ORAL at 21:51

## 2017-01-01 RX ADMIN — ENOXAPARIN SODIUM 30 MG: 30 INJECTION SUBCUTANEOUS at 16:26

## 2017-01-01 RX ADMIN — ASPIRIN 325 MG ORAL TABLET 325 MG: 325 PILL ORAL at 12:08

## 2017-01-01 RX ADMIN — ACETAMINOPHEN 650 MG: 650 SOLUTION ORAL at 11:15

## 2017-01-01 RX ADMIN — ACETAMINOPHEN 650 MG: 325 TABLET, FILM COATED ORAL at 16:22

## 2017-01-01 RX ADMIN — CLOPIDOGREL BISULFATE 75 MG: 75 TABLET, FILM COATED ORAL at 08:11

## 2017-01-01 RX ADMIN — CLOPIDOGREL BISULFATE 75 MG: 75 TABLET, FILM COATED ORAL at 09:18

## 2017-01-01 RX ADMIN — ENOXAPARIN SODIUM 30 MG: 30 INJECTION SUBCUTANEOUS at 16:32

## 2017-01-01 RX ADMIN — ATORVASTATIN CALCIUM 80 MG: 40 TABLET, FILM COATED ORAL at 21:00

## 2017-01-01 RX ADMIN — ACETAMINOPHEN 650 MG: 650 SOLUTION ORAL at 09:30

## 2017-01-01 RX ADMIN — ACETAMINOPHEN 650 MG: 650 SOLUTION ORAL at 08:35

## 2017-01-01 RX ADMIN — ACETAMINOPHEN 650 MG: 650 SOLUTION ORAL at 08:00

## 2017-01-01 RX ADMIN — ENOXAPARIN SODIUM 30 MG: 30 INJECTION SUBCUTANEOUS at 19:46

## 2017-01-01 RX ADMIN — CLOPIDOGREL BISULFATE 75 MG: 75 TABLET, FILM COATED ORAL at 10:02

## 2017-01-01 RX ADMIN — ENOXAPARIN SODIUM 30 MG: 30 INJECTION SUBCUTANEOUS at 17:17

## 2017-01-01 RX ADMIN — ACETAMINOPHEN 650 MG: 650 SOLUTION ORAL at 12:52

## 2017-01-01 RX ADMIN — ASPIRIN 325 MG ORAL TABLET 325 MG: 325 PILL ORAL at 08:35

## 2017-01-01 RX ADMIN — ATORVASTATIN CALCIUM 80 MG: 40 TABLET, FILM COATED ORAL at 21:12

## 2017-01-01 RX ADMIN — ATORVASTATIN CALCIUM 80 MG: 40 TABLET, FILM COATED ORAL at 22:16

## 2017-01-01 RX ADMIN — BARIUM SULFATE 15 ML: 400 SUSPENSION ORAL at 09:50

## 2017-01-01 RX ADMIN — ASPIRIN 325 MG ORAL TABLET 325 MG: 325 PILL ORAL at 09:19

## 2017-01-01 RX ADMIN — HYDROCHLOROTHIAZIDE 25 MG: 25 TABLET ORAL at 09:00

## 2017-01-01 RX ADMIN — BARIUM SULFATE 700 MG: 700 TABLET ORAL at 09:50

## 2017-01-01 RX ADMIN — ACETAMINOPHEN 650 MG: 325 TABLET, FILM COATED ORAL at 18:23

## 2017-01-01 RX ADMIN — ASPIRIN 325 MG ORAL TABLET 325 MG: 325 PILL ORAL at 09:11

## 2017-01-25 NOTE — MR AVS SNAPSHOT
Visit Information Date & Time Provider Department Dept. Phone Encounter #  
 1/25/2017 11:00 AM [place] [address] [address] [phone] [id number] Follow-up Instructions Return in about 6 months (around 7/25/2017) for physical, labs prior, EKG next visit. Follow-up and Disposition History Your Appointments 1/25/2017 11:00 AM  
[place] [address] [address]) Appt Note: Follow up  
 [address] [place] [address]  
  
   
 [address] [facility] [address] Upcoming Health Maintenance Date Due INFLUENZA AGE 9 TO ADULT 8/1/2016 MEDICARE YEARLY EXAM 7/26/2017 GLAUCOMA SCREENING Q2Y 7/25/2018 DTaP/Tdap/Td series (2 - Td) 11/8/2022 Allergies as of 1/25/2017  Review Complete On: 1/25/2017 By: Janine Valentino DO Severity Noted Reaction Type Reactions Pcn [Penicillins]  11/08/2012    Unable to Obtain Current Immunizations  Reviewed on 1/25/2017 Name Date Influenza High Dose Vaccine PF 1/25/2017, 10/15/2015 Influenza Vaccine Split 10/8/2012 Pneumococcal Conjugate (PCV-13) 7/25/2016 Pneumococcal Vaccine (Unspecified Type) 11/8/2012 TDAP Vaccine 11/8/2012 Zoster Vaccine, Live 7/18/2013 Reviewed by Janine Valentino DO on 1/25/2017 at 10:18 AM  
You Were Diagnosed With   
  
 Codes Comments Vascular dementia without behavioral disturbance    -  Primary ICD-10-CM: F01.50 ICD-9-CM: 290.40 Essential hypertension     ICD-10-CM: I10 
ICD-9-CM: 401.9 Gastroesophageal reflux disease with esophagitis     ICD-10-CM: K21.0 ICD-9-CM: 530.11 Pure hypercholesterolemia     ICD-10-CM: E78.00 ICD-9-CM: 272.0 Vitals BP Pulse Temp Resp Height(growth percentile) Weight(growth percentile)  155/75 (BP 1 Location: Right arm, BP Patient Position: Sitting) 81 95.9 °F (35.5 °C) (Oral) 16 5' 5\" (1.651 m) 157 lb 6.4 oz (71.4 kg) SpO2 BMI OB Status Smoking Status 95% 26.19 kg/m2 Hysterectomy Never Smoker BMI and BSA Data Body Mass Index Body Surface Area  
 26.19 kg/m 2 1.81 m 2 Preferred Pharmacy Pharmacy Name Phone [address], [facility] [phone] Your Updated Medication List  
  
   
This list is accurate as of: 1/25/17 10:25 AM.  Always use your most recent med list.  
  
  
  
  
 valsartan-hydroCHLOROthiazide 80-12.5 mg per tablet Commonly known as:  DIOVAN HCT Take 1 Tab by mouth daily. Follow-up Instructions Return in about 6 months (around 7/25/2017) for physical, labs prior, EKG next visit. To-Do List   
 Around 07/24/2017 Lab:  CBC WITH AUTOMATED DIFF Around 07/24/2017 Lab:  LIPID PANEL Around 07/24/2017 Lab:  METABOLIC PANEL, COMPREHENSIVE Around 07/24/2017 Lab:  TSH 3RD GENERATION Around 07/24/2017 Lab:  URINALYSIS W/ RFLX MICROSCOPIC Introducing [facility]! Radames Montes introduces Justinmind patient portal. Now you can access parts of your medical record, email your doctor's office, and request medication refills online. 1. In your internet browser, go to [email or web address] 2. Click on the First Time User? Click Here link in the Sign In box. You will see the New Member Sign Up page. 3. Enter your Justinmind Access Code exactly as it appears below. You will not need to use this code after youve completed the sign-up process. If you do not sign up before the expiration date, you must request a new code. · Justinmind Access Code: [email or web address] Expires: 4/25/2017 10:25 AM 
 
4. Enter the last four digits of your Social Security Number (xxxx) and Date of Birth (mm/dd/yyyy) as indicated and click Submit. You will be taken to the next sign-up page. 5. Create a Eastide ID. This will be your Eastide login ID and cannot be changed, so think of one that is secure and easy to remember. 6. Create a Eastide password. You can change your password at any time. 7. Enter your Password Reset Question and Answer. This can be used at a later time if you forget your password. 8. Enter your e-mail address. You will receive e-mail notification when new information is available in [address] 9. Click Sign Up. You can now view and download portions of your medical record. 10. Click the Download Summary menu link to download a portable copy of your medical information. If you have questions, please visit the Frequently Asked Questions section of the Eastide website. Remember, Eastide is NOT to be used for urgent needs. For medical emergencies, dial 911. Now available from your iPhone and Android! Please provide this summary of care documentation to your next provider. Your primary care clinician is listed as [id number] [facility]. If you have any questions after today's visit, please call [phone].

## 2017-01-25 NOTE — PROGRESS NOTES
Etelvina Acevedo is a 80 y.o.  female and presents with    Chief Complaint   Patient presents with    Hypertension    GERD    Cholesterol Problem    Dementia           Subjective:    Cardiovascular Review:  The patient has hypertension and hyperlipidemia. Diet and Lifestyle: not attempting to follow a low fat, low cholesterol diet, not attempting to follow a low sodium diet  Home BP Monitoring: is not measured at home. Pertinent ROS: taking medications as instructed, no medication side effects noted, no TIA's, no chest pain on exertion, no dyspnea on exertion, no swelling of ankles. Dementia ongonw    gerd ongoing      Additional Concerns:          Patient Active Problem List    Diagnosis Date Noted    Advance directive in chart 07/25/2016    Dementia 10/03/2013    HTN (hypertension) 11/08/2012    GERD (gastroesophageal reflux disease) 11/08/2012    Pure hypercholesterolemia 11/08/2012     Current Outpatient Prescriptions   Medication Sig Dispense Refill    valsartan-hydrochlorothiazide (DIOVAN HCT) 80-12.5 mg per tablet Take 1 Tab by mouth daily. 90 Tab 4     Allergies   Allergen Reactions    Pcn [Penicillins] Unable to Obtain     Past Medical History   Diagnosis Date    Advance directive in chart 7/25/2016    Gastrointestinal disorder     GERD (gastroesophageal reflux disease)     GERD (gastroesophageal reflux disease) 11/8/2012    HTN (hypertension) 11/8/2012    Hypercholesterolemia     Hypertension     Pure hypercholesterolemia 11/8/2012     Past Surgical History   Procedure Laterality Date    Hx orthopaedic      Hx gyn       Family History   Problem Relation Age of Onset    Cancer Father      Social History   Substance Use Topics    Smoking status: Never Smoker    Smokeless tobacco: Never Used    Alcohol use No       ROS       All other systems reviewed and are negative.       Objective:  Vitals:    01/25/17 1006   BP: 155/75   Pulse: 81   Resp: 16   Temp: 95.9 °F (35.5 °C) TempSrc: Oral   SpO2: 95%   Weight: 157 lb 6.4 oz (71.4 kg)   Height: 5' 5\" (1.651 m)   PainSc:   0 - No pain                 alert, well appearing, and in no distress, oriented to person, place, and time and normal appearing weight  Chest - clear to auscultation, no wheezes, rales or rhonchi, symmetric air entry  Heart - normal rate, regular rhythm, normal S1, S2, no murmurs, rubs, clicks or gallops  Abdomen - soft, nontender, nondistended, no masses or organomegaly        LABS     TESTS      Assessment/Plan:    Hypertension - stable  Hyperlipidemia - stable  gerd stable  Dementia - ongoing      Lab review: labs are reviewed, up to date and normal       I have discussed the diagnosis with the patient and the intended plan as seen in the above orders. The patient has received an after-visit summary and questions were answered concerning future plans. I have discussed medication side effects and warnings with the patient as well. I have reviewed the plan of care with the patient, accepted their input and they are in agreement with the treatment goals.      Follow-up Disposition: Not on File

## 2017-01-25 NOTE — PROGRESS NOTES
1. Have you been to the ER, urgent care clinic since your last visit? Hospitalized since your last visit? No    2. Have you seen or consulted any other health care providers outside of the [address] [place] [address] since your last visit? Include any pap smears or colon screening.  No

## 2017-07-26 NOTE — PROGRESS NOTES
Chanelle Zuñiga is a 80 y.o. female presents today for her annual medicare wellness. She would also like to discuss obtaining a TB test today. Pt is in Room # 6        Learning Assessment (baseline): Completed  Depression Screening: Completed  Fall Risk Screening: Completed  Abuse screening: Completed  ADL Assessment: Completed    1. Have you been to the ER, urgent care clinic since your last visit? Hospitalized since your last visit? No    2. Have you seen or consulted any other health care providers outside of the [address] [place] [address] since your last visit? Include any pap smears or colon screening. Yes When: 3 months ago Where: [facility]- eye doctor Reason for visit: eye exam     PPD Placement note  Chanelle Zuñiga, 80 y.o. female is here today for placement of PPD test  Reason for PPD test: facility/home  Pt taken PPD test before: yes  Verified in allergy area and with patient that they are not allergic to the products PPD is made of (Phenol or Tween). Yes  Is patient taking any oral or IV steroid medication now or have they taken it in the last month? no  Has the patient ever received the BCG vaccine?: no  Has the patient been in recent contact with anyone known or suspected of having active TB disease?: no       Date of exposure (if applicable): na       Name of person they were exposed to (if applicable): na  Patient's Country of origin?: [place]  O: Alert and oriented in NAD. P:  PPD placed on 7/26/2017. Patient advised to return for reading within 48-72 hours.

## 2017-07-26 NOTE — MR AVS SNAPSHOT
Visit Information Date & Time Provider Department Dept. Phone Encounter #  
 7/26/2017 10:00 [place] [address] [address] [phone] [id number] Follow-up Instructions Return in about 2 days (around 7/28/2017) for ppd read only otherwise 6 mo. Upcoming Health Maintenance Date Due  
 MEDICARE YEARLY EXAM 7/26/2017 INFLUENZA AGE 9 TO ADULT 8/1/2017 GLAUCOMA SCREENING Q2Y 7/25/2018 DTaP/Tdap/Td series (2 - Td) 11/8/2022 Allergies as of 7/26/2017  Review Complete On: 7/26/2017 By: Felipa Tanner DO Severity Noted Reaction Type Reactions Pcn [Penicillins]  11/08/2012    Unable to Obtain Current Immunizations  Reviewed on 1/25/2017 Name Date Influenza High Dose Vaccine PF 1/25/2017, 10/15/2015 Influenza Vaccine Split 10/8/2012 Pneumococcal Conjugate (PCV-13) 7/25/2016 TB Skin Test (PPD) Intradermal  Incomplete TDAP Vaccine 11/8/2012 ZZZ-RETIRED (DO NOT USE) Pneumococcal Vaccine (Unspecified Type) 11/8/2012 Zoster Vaccine, Live 7/18/2013 Not reviewed this visit You Were Diagnosed With   
  
 Codes Comments Routine general medical examination at a health care facility    -  Primary ICD-10-CM: Z00.00 ICD-9-CM: V70.0 Essential hypertension     ICD-10-CM: I10 
ICD-9-CM: 401.9 Gastroesophageal reflux disease with esophagitis     ICD-10-CM: K21.0 ICD-9-CM: 530.11 Pure hypercholesterolemia     ICD-10-CM: E78.00 ICD-9-CM: 272.0 Vascular dementia without behavioral disturbance     ICD-10-CM: F01.50 ICD-9-CM: 290.40 Vitals BP Pulse Temp Resp Height(growth percentile) Weight(growth percentile) 143/69 (BP 1 Location: Right arm, BP Patient Position: Sitting) 72 95.9 °F (35.5 °C) (Oral) 18 5' 5\" (1.651 m) 160 lb 3.2 oz (72.7 kg) SpO2 BMI OB Status Smoking Status 95% 26.66 kg/m2 Hysterectomy Never Smoker BMI and BSA Data Body Mass Index Body Surface Area 26.66 kg/m 2 1.83 m 2 Preferred Pharmacy Pharmacy Name Phone [address], [facility] [phone] Your Updated Medication List  
  
   
This list is accurate as of: 7/26/17 11:06 AM.  Always use your most recent med list.  
  
  
  
  
 valsartan-hydroCHLOROthiazide 80-12.5 mg per tablet Commonly known as:  DIOVAN HCT Take 1 Tab by mouth daily. Prescriptions Sent to Pharmacy Refills  
 valsartan-hydroCHLOROthiazide (DIOVAN HCT) 80-12.5 mg per tablet 4 Sig: Take 1 Tab by mouth daily. Class: Normal  
 Pharmacy: [address], [address] [place] [address] #: [phone] Route: Oral  
  
We Performed the Following AMB POC EKG ROUTINE W/ 12 LEADS, INTER & REP [60406 CPT(R)] AMB POC TUBERCULOSIS, INTRADERMAL (SKIN TEST) [71635 CPT(R)] Follow-up Instructions Return in about 2 days (around 7/28/2017) for ppd read only otherwise 6 mo. Introducing [facility]! Emani Pugh introduces [email or web address] patient portal. Now you can access parts of your medical record, email your doctor's office, and request medication refills online. 1. In your internet browser, go to [email or web address] 2. Click on the First Time User? Click Here link in the Sign In box. You will see the New Member Sign Up page. 3. Enter your [email or web address] Access Code exactly as it appears below. You will not need to use this code after youve completed the sign-up process. If you do not sign up before the expiration date, you must request a new code. · [email or web address] Access Code: [email or web address] Expires: 10/18/2017  8:53 AM 
 
4. Enter the last four digits of your Social Security Number (xxxx) and Date of Birth (mm/dd/yyyy) as indicated and click Submit. You will be taken to the next sign-up page. 5. Create a [email or web address] ID.  This will be your [email or web address] login ID and cannot be changed, so think of one that is secure and easy to remember. 6. Create a StrikeIron password. You can change your password at any time. 7. Enter your Password Reset Question and Answer. This can be used at a later time if you forget your password. 8. Enter your e-mail address. You will receive e-mail notification when new information is available in [address] 9. Click Sign Up. You can now view and download portions of your medical record. 10. Click the Download Summary menu link to download a portable copy of your medical information. If you have questions, please visit the Frequently Asked Questions section of the StrikeIron website. Remember, StrikeIron is NOT to be used for urgent needs. For medical emergencies, dial 911. Now available from your iPhone and Android! Please provide this summary of care documentation to your next provider. Your primary care clinician is listed as [phone] [facility]. If you have any questions after today's visit, please call [phone].

## 2017-07-26 NOTE — PROGRESS NOTES
THE SUBSEQUENT MEDICARE ANNUAL WELLNESS VISIT PROGRESS NOTES    This is a Subsequent Medicare Annual Wellness Visit providing Personalized Prevention Plan Services (PPPS) (Performed 12 months after initial AWV and PPPS )    I have reviewed the patient's medical history in detail and updated the computerized patient record. Donnell Marroquin is a 80 y.o.  female and presents for an subsequent annual wellness exam     Patient Active Problem List    Diagnosis Date Noted    Advance directive in chart 07/25/2016    Dementia 10/03/2013    HTN (hypertension) 11/08/2012    GERD (gastroesophageal reflux disease) 11/08/2012    Pure hypercholesterolemia 11/08/2012     Current Outpatient Prescriptions   Medication Sig Dispense Refill    valsartan-hydrochlorothiazide (DIOVAN HCT) 80-12.5 mg per tablet Take 1 Tab by mouth daily. 90 Tab 4     Allergies   Allergen Reactions    Pcn [Penicillins] Unable to Obtain     Past Medical History:   Diagnosis Date    Advance directive in chart 7/25/2016    Gastrointestinal disorder     GERD (gastroesophageal reflux disease)     GERD (gastroesophageal reflux disease) 11/8/2012    HTN (hypertension) 11/8/2012    Hypercholesterolemia     Hypertension     Pure hypercholesterolemia 11/8/2012     Past Surgical History:   Procedure Laterality Date    HX GYN      HX ORTHOPAEDIC       Family History   Problem Relation Age of Onset    Cancer Father      Social History   Substance Use Topics    Smoking status: Never Smoker    Smokeless tobacco: Never Used    Alcohol use No         ROS       All other systems reviewed and are negative.       History     Past Medical History:   Diagnosis Date    Advance directive in chart 7/25/2016    Gastrointestinal disorder     GERD (gastroesophageal reflux disease)     GERD (gastroesophageal reflux disease) 11/8/2012    HTN (hypertension) 11/8/2012    Hypercholesterolemia     Hypertension     Pure hypercholesterolemia 11/8/2012 Past Surgical History:   Procedure Laterality Date    HX GYN      HX ORTHOPAEDIC       Current Outpatient Prescriptions   Medication Sig Dispense Refill    valsartan-hydrochlorothiazide (DIOVAN HCT) 80-12.5 mg per tablet Take 1 Tab by mouth daily. 90 Tab 4     Allergies   Allergen Reactions    Pcn [Penicillins] Unable to Obtain     Family History   Problem Relation Age of Onset    Cancer Father      Social History   Substance Use Topics    Smoking status: Never Smoker    Smokeless tobacco: Never Used    Alcohol use No     Patient Active Problem List   Diagnosis Code    HTN (hypertension) I10    GERD (gastroesophageal reflux disease) K21.9    Pure hypercholesterolemia E78.00    Dementia F03.90    Advance directive in chart Z78.9       Health Maintenance History  Immunizations reviewed, dtap utd  , pneumovax utd , flu utd , zoster utd   Colonoscopy: utd ,   Chest CT : na ,  Eye exam: utd   Mammo refused   [place] refused     Health Care Directive or Living Will: yes    Depression Risk Factor Screening:      Patient Health Questionnaire (PHQ-2)   Over the last 2 weeks, how often have you been bothered by any of the following problems? · Little interest or pleasure in doing things? · Not at all. [0]  · Feeling down, depressed, or hopeless? · Not at all. [0]    Total Score: 0/6  PHQ-2 Assessment Scoring:   A score of 2 or more requires further screening with the PHQ-9    Alcohol Risk Factor Screening:     Women: On any occasion during the past 3 months, have you had more than 3 drinks containing alcohol? Do you average more than 7 drinks per week? Men: On any occasion during the past 3 months, have you had more than 4 drinks containing alcohol? Do you average more than 14 drinks per week? Functional Ability and Level of Safety:     Hearing Loss    mild    Activities of Daily Living   Self-care.    Requires assistance with: no ADLs    Fall Risk   No fall risk factors    Abuse Screen None      Examination   Physical Examination  Vitals:    07/26/17 1031   BP: 143/69   Pulse: 72   Resp: 18   Temp: 95.9 °F (35.5 °C)   TempSrc: Oral   SpO2: 95%   Weight: 160 lb 3.2 oz (72.7 kg)   Height: 5' 5\" (1.651 m)   PainSc:   0 - No pain     Body mass index is 26.66 kg/(m^2). Evaluation of Cognitive Function:  Mood/affect:appropriate   Appearance: well groomed   Family member/caregiver input: son here today     alert, well appearing, and in no distress, oriented to person, place, and time and normal appearing weight    Patient Care Team:  Ciara Alba, DO as PCP - General (Family Practice)  AUTUMN Naik (Orthopedic Surgery)  Liu Bowman MD (Gastroenterology)  Dung Orr MD (Orthopedic Surgery)    Advice/Referrals/Counseling/Plan:   Education and counseling provided:  Are appropriate based on today's review and evaluation  End-of-Life planning (with patient's consent)  Include in education list (weight loss, physical activity, smoking cessation, fall prevention, and nutrition)  current treatment plan is effective, no change in therapy. I have discussed the diagnosis with the patient and the intended plan as seen in the above orders. The patient has received an after-visit summary and questions were answered concerning future plans. I have discussed medication side effects and warnings with the patient as well. I have reviewed the plan of care with the patient, accepted their input and they are in agreement with the treatment goals. Follow-up Disposition: Not on File    _____________________________________________________________    Problem Assessment    for treatment of   Chief Complaint   Patient presents with    Hypertension    GERD    Cholesterol Problem    Dementia    Other     nursing home physical         SUBJECTIVE      Cardiovascular Review:  The patient has hypertension and hyperlipidemia.   Diet and Lifestyle: not attempting to follow a low fat, low cholesterol diet, not attempting to follow a low sodium diet  Home BP Monitoring: is not measured at home. Pertinent ROS: taking medications as instructed, no medication side effects noted, no TIA's, no chest pain on exertion, no dyspnea on exertion, no swelling of ankles. gerd stable on meds       Additional Concerns: dementia ongoing    Also wants NH admission physical done today req PPD        Visit Vitals    /69 (BP 1 Location: Right arm, BP Patient Position: Sitting)    Pulse 72    Temp 95.9 °F (35.5 °C) (Oral)    Resp 18    Ht 5' 5\" (1.651 m)    Wt 160 lb 3.2 oz (72.7 kg)    SpO2 95%    BMI 26.66 kg/m2     General:  Alert, cooperative, no distress, appears stated age. Head:  Normocephalic, without obvious abnormality, atraumatic. Eyes:  Conjunctivae/corneas clear. PERRL, EOMs intact. Fundi benign. Ears:  Normal TMs and external ear canals both ears. Nose: Nares normal. Septum midline. Mucosa normal. No drainage or sinus tenderness. Throat: Lips, mucosa, and tongue normal. Teeth and gums normal.   Neck: Supple, symmetrical, trachea midline, no adenopathy, thyroid: no enlargement/tenderness/nodules, no carotid bruit and no JVD. Back:   Symmetric, no curvature. ROM normal. No CVA tenderness. Lungs:   Clear to auscultation bilaterally. Chest wall:  No tenderness or deformity. Heart:  Regular rate and rhythm, S1, S2 normal, no murmur, click, rub or gallop. Breast Exam:  No tenderness, masses, or nipple abnormality. Abdomen:   Soft, non-tender. Bowel sounds normal. No masses,  No organomegaly. Genitalia:  Normal female without lesion, discharge or tenderness. Rectal:  Normal tone,  no masses or tenderness  Guaiac negative stool. Extremities: Extremities normal, atraumatic, no cyanosis or edema. Pulses: 2+ and symmetric all extremities. Skin: Skin color, texture, turgor normal. No rashes or lesions.    Lymph nodes: Cervical, supraclavicular, and axillary nodes normal. Neurologic: CNII-XII intact. Normal strength, sensation and reflexes throughout. LABS   Component      Latest Ref Rng & Units 7/21/2017 7/20/2017 7/20/2017 7/20/2017           9:10 AM  9:17 AM  9:17 AM  9:17 AM   WBC      4.6 - 13.2 K/uL       RBC      4.20 - 5.30 M/uL       HGB      12.0 - 16.0 g/dL       HCT      35.0 - 45.0 %       MCV      74.0 - 97.0 FL       MCH      24.0 - 34.0 PG       MCHC      31.0 - 37.0 g/dL       RDW      11.6 - 14.5 %       PLATELET      566 - 370 K/uL       MPV      9.2 - 11.8 FL       NEUTROPHILS      40 - 73 %       LYMPHOCYTES      21 - 52 %       MONOCYTES      3 - 10 %       EOSINOPHILS      0 - 5 %       BASOPHILS      0 - 2 %       ABS. NEUTROPHILS      1.8 - 8.0 K/UL       ABS. LYMPHOCYTES      0.9 - 3.6 K/UL       ABS. MONOCYTES      0.05 - 1.2 K/UL       ABS. EOSINOPHILS      0.0 - 0.4 K/UL       ABS. BASOPHILS      0.0 - 0.06 K/UL       DF             Sodium      136 - 145 mmol/L   140    Potassium      3.5 - 5.5 mmol/L   4.7    Chloride      100 - 108 mmol/L   106    CO2      21 - 32 mmol/L   28    Anion gap      3.0 - 18 mmol/L   6    Glucose      74 - 99 mg/dL   104 (H)    BUN      7.0 - 18 MG/DL   27 (H)    Creatinine      0.6 - 1.3 MG/DL   1.25    BUN/Creatinine ratio      12 - 20     22 (H)    GFR est AA      >60 ml/min/1.73m2   49 (L)    GFR est non-AA      >60 ml/min/1.73m2   40 (L)    Calcium      8.5 - 10.1 MG/DL   8.6    Bilirubin, total      0.2 - 1.0 MG/DL   0.3    ALT (SGPT)      13 - 56 U/L   21    AST      15 - 37 U/L   17    Alk.  phosphatase      45 - 117 U/L   82    Protein, total      6.4 - 8.2 g/dL   7.2    Albumin      3.4 - 5.0 g/dL   3.2 (L)    Globulin      2.0 - 4.0 g/dL   4.0    A-G Ratio      0.8 - 1.7     0.8    Color       YELLOW      Appearance       CLOUDY      Specific gravity      1.005 - 1.030   1.016      pH (UA)      5.0 - 8.0   5.5      Protein      NEG mg/dL NEGATIVE      Glucose      NEG mg/dL NEGATIVE      Ketone NEG mg/dL NEGATIVE      Bilirubin      NEG   NEGATIVE      Blood      NEG   NEGATIVE      Urobilinogen      0.2 - 1.0 EU/dL 0.2      Nitrites      NEG   NEGATIVE      Leukocyte Esterase      NEG   LARGE (A)      Cholesterol, total      <200 MG/DL    251 (H)   Triglyceride      <150 MG/DL    296 (H)   HDL Cholesterol      40 - 60 MG/DL    42   LDL, calculated      0 - 100 MG/DL    149.8 (H)   VLDL, calculated      MG/DL    59.2   CHOL/HDL Ratio      0 - 5.0      6.0 (H)   TSH      0.36 - 3.74 uIU/mL  2.23       Component      Latest Ref Rng & Units 7/20/2017           9:17 AM   WBC      4.6 - 13.2 K/uL 7.7   RBC      4.20 - 5.30 M/uL 4.07 (L)   HGB      12.0 - 16.0 g/dL 11.9 (L)   HCT      35.0 - 45.0 % 36.8   MCV      74.0 - 97.0 FL 90.4   MCH      24.0 - 34.0 PG 29.2   MCHC      31.0 - 37.0 g/dL 32.3   RDW      11.6 - 14.5 % 13.4   PLATELET      769 - 235 K/uL 282   MPV      9.2 - 11.8 FL 9.9   NEUTROPHILS      40 - 73 % 54   LYMPHOCYTES      21 - 52 % 35   MONOCYTES      3 - 10 % 6   EOSINOPHILS      0 - 5 % 4   BASOPHILS      0 - 2 % 1   ABS. NEUTROPHILS      1.8 - 8.0 K/UL 4.2   ABS. LYMPHOCYTES      0.9 - 3.6 K/UL 2.7   ABS. MONOCYTES      0.05 - 1.2 K/UL 0.5   ABS. EOSINOPHILS      0.0 - 0.4 K/UL 0.3   ABS. BASOPHILS      0.0 - 0.06 K/UL 0.0   DF       AUTOMATED   Sodium      136 - 145 mmol/L    Potassium      3.5 - 5.5 mmol/L    Chloride      100 - 108 mmol/L    CO2      21 - 32 mmol/L    Anion gap      3.0 - 18 mmol/L    Glucose      74 - 99 mg/dL    BUN      7.0 - 18 MG/DL    Creatinine      0.6 - 1.3 MG/DL    BUN/Creatinine ratio      12 - 20      GFR est AA      >60 ml/min/1.73m2    GFR est non-AA      >60 ml/min/1.73m2    Calcium      8.5 - 10.1 MG/DL    Bilirubin, total      0.2 - 1.0 MG/DL    ALT (SGPT)      13 - 56 U/L    AST      15 - 37 U/L    Alk.  phosphatase      45 - 117 U/L    Protein, total      6.4 - 8.2 g/dL    Albumin      3.4 - 5.0 g/dL    Globulin      2.0 - 4.0 g/dL    A-G Ratio      0.8 - 1.7      Color          Appearance          Specific gravity      1.005 - 1.030      pH (UA)      5.0 - 8.0      Protein      NEG mg/dL    Glucose      NEG mg/dL    Ketone      NEG mg/dL    Bilirubin      NEG      Blood      NEG      Urobilinogen      0.2 - 1.0 EU/dL    Nitrites      NEG      Leukocyte Esterase      NEG      Cholesterol, total      <200 MG/DL    Triglyceride      <150 MG/DL    HDL Cholesterol      40 - 60 MG/DL    LDL, calculated      0 - 100 MG/DL    VLDL, calculated      MG/DL    CHOL/HDL Ratio      0 - 5.0      TSH      0.36 - 3.74 uIU/mL      TESTS  ekg  nsr        Assessment/Plan:      Hypertension - well controlled, stable  Hyperlipidemia - well controlled, stableDiagnoses and all orders for this visit:    1. Routine general medical examination at a health care facility  -     AMB POC EKG ROUTINE W/ 12 LEADS, INTER & REP  -     AMB POC TUBERCULOSIS, INTRADERMAL (SKIN TEST)    2. Essential hypertension  -     AMB POC EKG ROUTINE W/ 12 LEADS, INTER & REP  -     AMB POC TUBERCULOSIS, INTRADERMAL (SKIN TEST)    3. Gastroesophageal reflux disease with esophagitis  -     AMB POC EKG ROUTINE W/ 12 LEADS, INTER & REP  -     AMB POC TUBERCULOSIS, INTRADERMAL (SKIN TEST)    4. Pure hypercholesterolemia  -     AMB POC EKG ROUTINE W/ 12 LEADS, INTER & REP  -     AMB POC TUBERCULOSIS, INTRADERMAL (SKIN TEST)    5. Vascular dementia without behavioral disturbance  -     AMB POC EKG ROUTINE W/ 12 LEADS, INTER & REP  -     AMB POC TUBERCULOSIS, INTRADERMAL (SKIN TEST)          Lab review: labs are reviewed, up to date and normal    More than 1/2 of this 40 minute visit was spent in counselling and coordination of care, as described above.

## 2017-07-26 NOTE — ACP (ADVANCE CARE PLANNING)
Advance Care Planning (ACP) Provider Note - Comprehensive     Date of ACP Conversation: 07/26/17  Persons included in Conversation:  patient and POA  Length of ACP Conversation in minutes:  <16 minutes (Non-Billable)    Authorized Decision Maker (if patient is incapable of making informed decisions): This person is:  Healthcare Agent/Medical Power of  under Advance Directive          General ACP for ALL Patients with Decision Making Capacity:   Importance of advance care planning, including choosing a healthcare agent to communicate patient's healthcare decisions if patient lost the ability to make decisions, such as after a sudden illness or accident    Review of Existing Advance Directive:       For Serious or Chronic Illness:  Understanding of medical condition      Interventions Provided:  Reviewed existing Advance Directive

## 2017-07-28 NOTE — PROGRESS NOTES
PPD Reading Note  PPD read and results entered in [facility]. Result: 0 mm induration.   Interpretation: Negative  If test not read within 48-72 hours of initial placement, patient advised to repeat in other arm 1-3 weeks after this test.  Allergic reaction: no    Verified from Dr. Judd Cast

## 2017-08-15 NOTE — TELEPHONE ENCOUNTER
Earnest from [address] [place] [address] stated that she faxed a form that needs to be filled out by Dr. Juliana Kelley as soon as possible. Ms. Tinoco explained that she needs a specific direction on how the patient should take ibuprofen and peptobismol. Ms. Tinoco added that it has to state the specific time that the patient needs to take ibuprofen and peptobismol.  Asking to be called back [phone] fax: [phone]

## 2017-09-03 PROBLEM — I63.9 CVA (CEREBRAL VASCULAR ACCIDENT) (HCC): Status: ACTIVE | Noted: 2017-01-01

## 2017-09-03 NOTE — ED TRIAGE NOTES
Patient c/o dizziness that began this morning. Patient had headache yesterday.  Son at bedside, patient has facial droop and son states that he did not notice facial droop until they got to hospital

## 2017-09-03 NOTE — ED NOTES
Spoke with Dr. Prashanth Jo regarding NPO status of patient. Dr. Prashanth Jo stated that patient could have small amount of liquids if tolerated by patient.

## 2017-09-03 NOTE — PROGRESS NOTES
Reason for Renal Dosing: Per Renal Dosing Policy    Patient clinical status and labs ordered/reviewed. Pt Weight Weight: 73.5 kg (162 lb)   Serum Creatinine Lab Results   Component Value Date/Time    Creatinine 1.31 09/03/2017 11:20 AM    Creatinine, POC 1.3 09/03/2017 11:16 AM       Creatinine Clearance Estimated Creatinine Clearance: 26.9 mL/min (based on Cr of 1.31). BUN Lab Results   Component Value Date/Time    BUN 27 09/03/2017 11:20 AM    BUN, POC 31 09/03/2017 11:16 AM       WBC Lab Results   Component Value Date/Time    WBC 7.2 09/03/2017 11:20 AM      Temperature Temp: 97.8 °F (36.6 °C)   HR Pulse (Heart Rate): 72     BP BP: 189/88           Drug type: Non-Antibiotic    Drug/dose: Enoxaparin 40 mg every 24 hours was adjusted to Enoxaparin 30 mg every 24 hours     Continue to monitor.     Signed Eloisa Conley PHARMD  Date 9/3/2017  Time 5:03 PM

## 2017-09-03 NOTE — H&P
Medicine History and Physical    Patient: Graciela Staton   Age:  80 y.o. Assessment   Principal Problem:    CVA (cerebral vascular accident) ([facility] [address] (9/3/2017)    Active Problems:    HTN (hypertension) (11/8/2012)      Dementia (10/3/2013)          Plan     HTN   - PRN hydralazine, permissive HTN    CVA   - No intervention   - ASA, STATIN, Speech , OT PT.  q 4 NIH. Echo. - tele monitor   - lipid panel, AIc    DISPO    Anticipated Date of Discharge: 2-3 days  Anticipated Disposition (home, SNF) : Home vs snf    Chief Complaint:   Chief Complaint   Patient presents with    Facial Droop    Dizziness         HPI:   Graciela Staton is a 80y.o. year old female who presents with  CVA. Patient has history of likely TIA in past.  She has hx of HTN. Yesterday son noted complaints of R facial pain , used warm compress thinking it was sinus pain. This am complained of congestion until after breakfast when he said she was confused and he noted she was less stable on her feet. He usually uses a cane and if she doesn't should be (b/l ankle breaks years ago). On way to hospital noted L facial droop. Having some problems using straw.  + PICA CVA. Will admit for stroke workup. Per ED no intervention by Sandi Fuchs.       Review of Systems - Unable to attain 2/2 Dementia/CVA      Past Medical History:  Past Medical History:   Diagnosis Date    Advance directive in chart 7/25/2016    Gastrointestinal disorder     GERD (gastroesophageal reflux disease)     GERD (gastroesophageal reflux disease) 11/8/2012    HTN (hypertension) 11/8/2012    Hypercholesterolemia     Hypertension     Pure hypercholesterolemia 11/8/2012       Past Surgical History:  Past Surgical History:   Procedure Laterality Date    HX GYN      HX ORTHOPAEDIC         Family History:  Family History   Problem Relation Age of Onset    Cancer Father        Social History:  Social History     Social History    Marital status:      Spouse name: N/A  Number of children: N/A    Years of education: N/A     Social History Main Topics    Smoking status: Never Smoker    Smokeless tobacco: Never Used    Alcohol use No    Drug use: No    Sexual activity: No     Other Topics Concern    None     Social History Narrative       Home Medications:  Prior to Admission medications    Medication Sig Start Date End Date Taking? Authorizing Provider   valsartan-hydroCHLOROthiazide (DIOVAN HCT) 80-12.5 mg per tablet Take 1 Tab by mouth daily. 7/26/17   Willie Cabrales.,        Allergies: Allergies   Allergen Reactions    Pcn [Penicillins] Unable to Obtain           Physical Exam:     Visit Vitals    /74    Pulse 61    Temp 98.6 °F (37 °C)    Resp 16    Ht 5' 4\" (1.626 m)    Wt 73.5 kg (162 lb)    SpO2 96%    BMI 27.81 kg/m2       Physical Exam:  General appearance: mildly lethargic, confusion, hx of dementia, NAD  Head: Normocephalic, without obvious abnormality, atraumatic  Neck: supple, trachea midline  Lungs: clear to auscultation bilaterally  Heart: regular rate and rhythm, S1, S2 normal, no murmur, click, rub or gallop  Abdomen: soft, non-tender. Bowel sounds normal. No masses,  no organomegaly  Extremities: extremities normal, atraumatic, no cyanosis or edema  Skin: Skin color, texture, turgor normal. No rashes or lesions  Neurologic: Appears non focal other than some facial droop at this time on Left and inablitliy looking to left, dementia, confusion.       Intake and Output:  Current Shift:     Last three shifts:       Lab/Data Reviewed:  CMP:   Lab Results   Component Value Date/Time     09/03/2017 11:20 AM    K 4.0 09/03/2017 11:20 AM     09/03/2017 11:20 AM    CO2 26 09/03/2017 11:20 AM    AGAP 8 09/03/2017 11:20 AM     (H) 09/03/2017 11:20 AM    BUN 27 (H) 09/03/2017 11:20 AM    CREA 1.31 (H) 09/03/2017 11:20 AM    GFRAA 46 (L) 09/03/2017 11:20 AM    GFRNA 38 (L) 09/03/2017 11:20 AM    CA 8.4 (L) 09/03/2017 11:20 AM ALB 3.0 (L) 09/03/2017 11:20 AM    TP 6.8 09/03/2017 11:20 AM    GLOB 3.8 09/03/2017 11:20 AM    AGRAT 0.8 09/03/2017 11:20 AM    SGOT 18 09/03/2017 11:20 AM    ALT 21 09/03/2017 11:20 AM     CBC:   Lab Results   Component Value Date/Time    WBC 7.2 09/03/2017 11:20 AM    HGB 11.6 (L) 09/03/2017 11:20 AM    HCT 34.6 (L) 09/03/2017 11:20 AM     09/03/2017 11:20 AM     All Cardiac Markers in the last 24 hours:   Lab Results   Component Value Date/Time    CPK 89 09/03/2017 11:20 AM    CKMB <1.0 09/03/2017 11:20 AM    CKND1  09/03/2017 11:20 AM     CALCULATION NOT PERFORMED WHEN RESULT IS BELOW LINEAR LIMIT    Mark Hun <0.02 09/03/2017 11:20 AM       Clark Floyd MD    September 3, 2017

## 2017-09-03 NOTE — PROGRESS NOTES
Problem: Falls - Risk of  Goal: *Absence of Falls  Document Martina Fall Risk and appropriate interventions in the flowsheet.   Outcome: Progressing Towards Goal  Fall Risk Interventions:

## 2017-09-03 NOTE — ED PROVIDER NOTES
HPI Comments: 10:53 AM Ck Noguera is a 80 y.o. female who presents to the ED c/o  dizziness begining last night. pt denies pain and visual defecits. Per pt's son pt does not normally exhibit facial droop however noticed present left facial droop upon arriving at ED. Hx is limited due to acuteness of condition. Past Medical History:   Diagnosis Date    Advance directive in chart 7/25/2016    Gastrointestinal disorder     GERD (gastroesophageal reflux disease)     GERD (gastroesophageal reflux disease) 11/8/2012    HTN (hypertension) 11/8/2012    Hypercholesterolemia     Hypertension     Pure hypercholesterolemia 11/8/2012       Past Surgical History:   Procedure Laterality Date    HX GYN      HX ORTHOPAEDIC           Family History:   Problem Relation Age of Onset    Cancer Father        Social History     Social History    Marital status:      Spouse name: N/A    Number of children: N/A    Years of education: N/A     Occupational History    Not on file. Social History Main Topics    Smoking status: Never Smoker    Smokeless tobacco: Never Used    Alcohol use No    Drug use: No    Sexual activity: No     Other Topics Concern    Not on file     Social History Narrative         ALLERGIES: Pcn [penicillins]    Review of Systems   Unable to perform ROS: Acuity of condition   Neurological: Positive for facial asymmetry (left facial droop). Vitals:    09/03/17 1145 09/03/17 1150 09/03/17 1155 09/03/17 1200   BP: 162/81 177/80 174/82 175/89   Pulse: 62 66 65 60   Resp: 19 18 17 14   Temp:       SpO2: 97% 96% 99% 97%   Weight:       Height:                Physical Exam   Constitutional: She appears well-developed and well-nourished. HENT:   Head: Normocephalic and atraumatic. Eyes: Conjunctivae are normal. No scleral icterus. Neck: Normal range of motion. Neck supple. No JVD present. Cardiovascular: Normal rate, regular rhythm and normal heart sounds.     4 intact extremity pulses   Pulmonary/Chest: Effort normal and breath sounds normal.   Abdominal: Soft. She exhibits no mass. There is no tenderness. Musculoskeletal: Normal range of motion. Lymphadenopathy:     She has no cervical adenopathy. Neurological: She is alert. She has normal strength. No sensory deficit. Coordination normal.   Left Hemineglect, cannot gaze to the left   Skin: Skin is warm and dry. Nursing note and vitals reviewed.        [facility]  ED Course       CRITICAL CARE (ASAP ONLY)  Performed by: Kristi Benavidez  Authorized by: Kristi Benavidez     Critical care provider statement:     Critical care time (minutes):  45    Critical care start time:  9/3/2017 10:41 AM    Critical care end time:  9/3/2017 11:26 AM    Critical care was necessary to treat or prevent imminent or life-threatening deterioration of the following conditions: CVA/Code S.    Critical care was time spent personally by me on the following activities:  Discussions with consultants, evaluation of patient's response to treatment, ordering and review of laboratory studies, ordering and review of radiographic studies, re-evaluation of patient's condition and examination of patient          Vitals:  Patient Vitals for the past 12 hrs:   Temp Pulse Resp BP SpO2   09/03/17 1200 - 60 14 175/89 97 %   09/03/17 1155 - 65 17 174/82 99 %   09/03/17 1150 - 66 18 177/80 96 %   09/03/17 1145 - 62 19 162/81 97 %   09/03/17 1140 - 62 15 181/89 95 %   09/03/17 1135 - 68 20 183/79 96 %   09/03/17 1130 - 68 16 173/69 95 %   09/03/17 1126 - 70 17 - 94 %   09/03/17 1125 - - - 156/69 -   09/03/17 1115 - 68 20 158/70 98 %   09/03/17 1110 - 78 20 144/63 97 %   09/03/17 1100 - 75 20 148/67 93 %   09/03/17 1048 98.6 °F (37 °C) - - - -       Medications Ordered:  Medications   aspirin (ASPIRIN) tablet 325 mg (325 mg Oral Given 9/3/17 1208)       Lab Findings:  Recent Results (from the past 12 hour(s))   GLUCOSE, POC    Collection Time: 09/03/17 10:46 AM Result Value Ref Range    Glucose (POC) 135 (H) 70 - 110 mg/dL   POC CHEM8    Collection Time: 09/03/17 11:16 AM   Result Value Ref Range    CO2, POC 27 (H) 19 - 24 MMOL/L    Glucose,  (H) 74 - 106 MG/DL    BUN, POC 31 (H) 7 - 18 MG/DL    Creatinine, POC 1.3 0.6 - 1.3 MG/DL    GFRAA, POC 46 (L) >60 ml/min/1.73m2    GFRNA, POC 38 (L) >60 ml/min/1.73m2    Sodium,  136 - 145 MMOL/L    Potassium, POC 4.0 3.5 - 5.5 MMOL/L    Calcium, ionized (POC) 1.06 (L) 1.12 - 1.32 MMOL/L    Chloride,  100 - 108 MMOL/L    Anion gap, POC 13 10 - 20      Hematocrit, POC 37 36 - 49 %    Hemoglobin, POC 12.6 12 - 16 G/DL   CBC W/O DIFF    Collection Time: 09/03/17 11:20 AM   Result Value Ref Range    WBC 7.2 4.6 - 13.2 K/uL    RBC 3.90 (L) 4.20 - 5.30 M/uL    HGB 11.6 (L) 12.0 - 16.0 g/dL    HCT 34.6 (L) 35.0 - 45.0 %    MCV 88.7 74.0 - 97.0 FL    MCH 29.7 24.0 - 34.0 PG    MCHC 33.5 31.0 - 37.0 g/dL    RDW 13.1 11.6 - 14.5 %    PLATELET 649 798 - 832 K/uL    MPV 9.4 9.2 - 58.2 FL   METABOLIC PANEL, COMPREHENSIVE    Collection Time: 09/03/17 11:20 AM   Result Value Ref Range    Sodium 141 136 - 145 mmol/L    Potassium 4.0 3.5 - 5.5 mmol/L    Chloride 107 100 - 108 mmol/L    CO2 26 21 - 32 mmol/L    Anion gap 8 3.0 - 18 mmol/L    Glucose 120 (H) 74 - 99 mg/dL    BUN 27 (H) 7.0 - 18 MG/DL    Creatinine 1.31 (H) 0.6 - 1.3 MG/DL    BUN/Creatinine ratio 21 (H) 12 - 20      GFR est AA 46 (L) >60 ml/min/1.73m2    GFR est non-AA 38 (L) >60 ml/min/1.73m2    Calcium 8.4 (L) 8.5 - 10.1 MG/DL    Bilirubin, total 0.4 0.2 - 1.0 MG/DL    ALT (SGPT) 21 13 - 56 U/L    AST (SGOT) 18 15 - 37 U/L    Alk.  phosphatase 79 45 - 117 U/L    Protein, total 6.8 6.4 - 8.2 g/dL    Albumin 3.0 (L) 3.4 - 5.0 g/dL    Globulin 3.8 2.0 - 4.0 g/dL    A-G Ratio 0.8 0.8 - 1.7     PROTHROMBIN TIME + INR    Collection Time: 09/03/17 11:20 AM   Result Value Ref Range    Prothrombin time 14.7 11.5 - 15.2 sec    INR 1.2 0.8 - 1.2     THROMBIN TIME Collection Time: 09/03/17 11:20 AM   Result Value Ref Range    Thrombin time 15.7 13.8 - 18.2 SECS   CARDIAC PANEL,(CK, CKMB & TROPONIN)    Collection Time: 09/03/17 11:20 AM   Result Value Ref Range    CK 89 26 - 192 U/L    CK - MB <1.0 <3.6 ng/ml    CK-MB Index  0.0 - 4.0 %     CALCULATION NOT PERFORMED WHEN RESULT IS BELOW LINEAR LIMIT    Troponin-I, Qt. <0.02 0.0 - 0.045 NG/ML   FIBRINOGEN    Collection Time: 09/03/17 11:20 AM   Result Value Ref Range    Fibrinogen 231 210 - 451 mg/dL   TYPE & SCREEN    Collection Time: 09/03/17 11:20 AM   Result Value Ref Range    Crossmatch Expiration 09/06/2017     ABO/Rh(D) A NEGATIVE     Antibody screen NEG    ASPIRIN TEST    Collection Time: 09/03/17 11:20 AM   Result Value Ref Range    Aspirin test 350 ARU   PLATELET FUNCTION, VERIFY NOW P2Y12    Collection Time: 09/03/17 11:20 AM   Result Value Ref Range    P2Y12 Plt response 326 194 - 418 PRU   EKG, 12 LEAD, INITIAL    Collection Time: 09/03/17 11:44 AM   Result Value Ref Range    Ventricular Rate 63 BPM    Atrial Rate 63 BPM    P-R Interval 196 ms    QRS Duration 80 ms    Q-T Interval 464 ms    QTC Calculation (Bezet) 474 ms    Calculated P Axis 68 degrees    Calculated R Axis 6 degrees    Calculated T Axis 80 degrees    Diagnosis       Normal sinus rhythm  Nonspecific T wave abnormality  Prolonged QT  Abnormal ECG  When compared with ECG of 28-AUG-2016 09:29,  No significant change was found     URINALYSIS W/ RFLX MICROSCOPIC    Collection Time: 09/03/17 12:05 PM   Result Value Ref Range    Color YELLOW      Appearance CLEAR      Specific gravity 1.017 1.005 - 1.030      pH (UA) 5.0 5.0 - 8.0      Protein NEGATIVE  NEG mg/dL    Glucose NEGATIVE  NEG mg/dL    Ketone NEGATIVE  NEG mg/dL    Bilirubin NEGATIVE  NEG      Blood NEGATIVE  NEG      Urobilinogen 0.2 0.2 - 1.0 EU/dL    Nitrites POSITIVE (A) NEG      Leukocyte Esterase MODERATE (A) NEG         EKG Interpretation by ED physician:  11:44am: NSR at 63, no acute process    X-ray, CT or radiology findings or impressions:  CT HEAD WO CONT   Right PCA, acute and subacute stroke      XR CHEST PORT    (Results Pending)   CTA HEAD NECK W WO CONT    (Results Pending)   CT PERF W CBF    (Results Pending)       Progress notes, consult notes, or additional procedure notes:  10:51 AM, 9/3/2017   Consult:  Discussed care with Dr. Sabas Means, neurologist. Standard discussion; including history of patients chief complaint, available diagnostic results, and treatment course. Discussed calling Code S Level 2.    11:35 AM, 9/3/2017   Consult:  Discussed care with Dr. Seth Morrell, radiology. Standard discussion; including history of patients chief complaint, available diagnostic results, and treatment course. Right PCA, acute and subacute stroke. 11:44 AM, 9/3/2017   Teleneurology has not called back. Unit secretary is paging now. 11:48 AM, 9/3/2017   Consult:  Discussed care with Dr. Sabas Means, neurology. Standard discussion; including history of patients chief complaint, available diagnostic results, and treatment course. Discussed no further intervention; 325mg asprin, admitting, MRI and MRA on nonemergent basis. Paging hospitalist.    12:04 PM, 9/3/2017   Consult:  Discussed care with Dr. Garry Tsai,  hospitalist. Standard discussion; including history of patients chief complaint, available diagnostic results, and treatment course. Will admit. Reevaluation of the patient:   10:59 AM Pt has been reassessed, condition unchanged. Diagnosis:   1. Cerebrovascular accident (CVA) due to thrombosis of right posterior cerebral artery (HCC)        Disposition: home    Follow-up Information     None           Patient's Medications   Start Taking    No medications on file   Continue Taking    VALSARTAN-HYDROCHLOROTHIAZIDE (DIOVAN HCT) 80-12.5 MG PER TABLET    Take 1 Tab by mouth daily.    These Medications have changed    No medications on file   Stop Taking    No medications on file       Scribe Attestation      Willow Hall acting as a scribe for and in the presence of Jacque Madrigal MD      September 03, 2017 at 10:51 AM       Provider Attestation:      I personally performed the services described in the documentation, reviewed the documentation, as recorded by the scribe in my presence, and it accurately and completely records my words and actions.  September 03, 2017 at 10:51 AM - Jacque Madrigal MD

## 2017-09-04 NOTE — PROGRESS NOTES
Medicine Progress Note    Patient: Ashlyn Zee   Age:  80 y.o.  DOA: 9/3/2017   Admit Dx / CC: CVA (cerebral vascular accident) ([facility] [address]  LOS:  LOS: 1 day     Assessment/Plan   Principal Problem:    CVA (cerebral vascular accident) ([facility] [address] (9/3/2017)    Active Problems:    HTN (hypertension) (11/8/2012)      Dementia (10/3/2013)        Additional Plan notes     HTN                        - PRN hydralazine, low dose aceI and hctz started today     CVA                        - No intervention as per tele neuro/baker                                      - ASA, STATIN, Speech cleared patient , OT PT, plan rehab Wednesday . Nadira Holcomb q 4 NIH. Echo pending                            - tele monitor            Unsure what it is we hope to gain from more consults if neurointerventional has stated no intervention to be done. Tele neuro has already consulted. Patient in 80 yrs old. Only benefit I can see is possible addition of plavix. (anticoagulant if echo + for thrombus)  . Will get MRA to determine if plavix needed. DISPO     Anticipated Date of Discharge: 2 days  Anticipated Disposition (home, SNF) :snf    Subjective:   Patient seen and examined. No complaints today, spoke to family at bedside    Objective:     Visit Vitals    /88 (BP 1 Location: Right arm, BP Patient Position: At rest;Supine)    Pulse 89    Temp 97.8 °F (36.6 °C)    Resp 18    Ht 5' 4\" (1.626 m)    Wt 73.5 kg (162 lb)    SpO2 92%    Breastfeeding No    BMI 27.81 kg/m2       Physical Exam:  General appearance: alert, cooperative, no distress, appears stated age  Head: Normocephalic, without obvious abnormality, atraumatic  Neck: supple, trachea midline  Lungs: clear to auscultation bilaterally  Heart: regular rate and rhythm, S1, S2 normal, no murmur, click, rub or gallop  Abdomen: soft, non-tender.  Bowel sounds normal. No masses,  no organomegaly  Extremities: extremities normal, atraumatic, no cyanosis or edema  Skin: Skin color, texture, turgor normal. No rashes or lesions  Neurologic: Grossly normal    Intake and Output:  Current Shift:  09/04 0701 - 09/04 1900  In: 650 [I.V.:650]  Out: 100 [Urine:100]  Last three shifts:  09/02 1901 - 09/04 0700  In: 0   Out: 250 [Urine:250]    Lab/Data Reviewed:  CMP: No results found for: NA, K, CL, CO2, AGAP, GLU, BUN, CREA, GFRAA, GFRNA, CA, MG, PHOS, ALB, TBIL, TP, ALB, GLOB, AGRAT, SGOT, ALT, GPT  CBC: No results found for: WBC, HGB, HGBEXT, HCT, HCTEXT, PLT, PLTEXT, HGBEXT, HCTEXT, PLTEXT  All Cardiac Markers in the last 24 hours: No results found for: CPK, CKMMB, CKMB, RCK3, CKMBT, CKNDX, CKND1, ANGIE, TROPT, TROIQ, AUREA, TROPT, TNIPOC, BNP, BNPP    Medications Reviewed:  Current Facility-Administered Medications   Medication Dose Route Frequency    insulin lispro (HUMALOG) injection   SubCUTAneous AC&HS    amLODIPine (NORVASC) tablet 10 mg  10 mg Oral DAILY    enoxaparin (LOVENOX) injection 30 mg  30 mg SubCUTAneous Q24H    acetaminophen (TYLENOL) suppository 650 mg  650 mg Rectal Q4H PRN    ondansetron (ZOFRAN) injection 4 mg  4 mg IntraVENous Q4H PRN    aspirin (ASPIRIN) tablet 325 mg  325 mg Oral DAILY    atorvastatin (LIPITOR) tablet 80 mg  80 mg Oral QHS    hydrALAZINE (APRESOLINE) 20 mg/mL injection 10 mg  10 mg IntraVENous Q6H PRN       Garry Kramer MD    September 4, 2017

## 2017-09-04 NOTE — PROGRESS NOTES
2010: Bedside verbal shift report received from [employer]olie [employer]. Pt is awake in bed, no signs of distress, instructed to press call light if assistance is needed, call light within reach. Son at bedside. 1950: Dual NIH performed with Gretchen Huang RN. Bedside and Verbal shift change report given to Gretchen Huang RN (oncoming nurse) by Ascencion Bolaños RN (offgoing nurse). Report included the following information SBAR, Kardex, Intake/Output, MAR and Recent Results.

## 2017-09-04 NOTE — PROGRESS NOTES
0730 received pt, oriented to self and place, restless and confused, pt pulled iv out this am. Bed alarm on, call bell in reach. Will continue to monitor. 1910 Bedside and Verbal shift change report given to eugenio  (oncoming nurse) by Belkys Guy RN   (offgoing nurse). Report included the following information Kardex, MAR and Recent Results.

## 2017-09-04 NOTE — MANAGEMENT PLAN
[facility]   Discharge Planning/ Assessment    Reasons for Intervention:   Interviewed patient with permission of son to be in room and assist. Verified demographics listed on face sheet with patient; all information correct. Pt has Medicare A&B and  insurance. Patient stated their PCP is Dr Shiela Ferreira and last appt July 25th . Patient lives in single family home and son lives with her . Patient's NOK and POA is son, Morro Orozco at [phone]. Patient moderately independent with ADLs prior to admission. Son assists with driving pt to errands/appointments, chores, meals. DME prior to admission: cane. Discharge plan: high likelihood pt will need SNF. Son is agreeable; pt hesitant and wants to go home, but will be agreeable if physical therapy recommends. Choice is [facility]; they will choose more options if needed.  Uploaded in [employer] and matched  Jose Francisco Castro RN BSN  Outcomes Manager  Pager # [phone]    High Risk Criteria  [] Yes  [x]No   Physician Referral  [] Yes  [x]No        Date    Nursing Referral  [] Yes  [x]No        Date    Patient/Family Request  [] Yes  [x]No        Date       Resources:    Medicare  [x] Yes  []No   Medicaid  [] Yes  [x]No   No Resources  [] Yes  [x]No   Private Insurance  [] Yes  [x]No    Name/Phone Number    Other  [x] Yes  []No        (i.e. Workman's Comp)         Prior Services:    Prior Services  [] Yes  [x]No   Home Health  [] Yes  [x]No   [address] [place]  [] Yes  [x]No        Number of [address]  [] Yes  [x]No       Meals on Wheels  [] Yes  [x]No   Office on Aging  [] Yes  [x]No   Transportation Services  [] Yes  [x]No   Nursing Home  [] Yes  [x]No        Nursing Home Name    [address]  [] Yes  [x]No        [address] Name    Other       Information Source:      Information obtained from  [x] Patient  [] Parent   [] Guardian  [] Child  [] Spouse   [] Significant Other/Partner   [] Friend      [] EMS    [] Nursing Home Chart          [] Other:   Chart Review  [x] Yes  []No     Family/Support System:    Patient lives with  [] Alone    [] Spouse   [] Significant Other  [x] Child  [] Caretaker   [] Parent  [] Sibling     [] Other       Other Support System:    Is the patient responsible for care of others  [] Yes  [x]No   Information of person caring for patient on  discharge    Managers financial affairs independently  [] Yes  [x]No   If no, explain:      Status Prior to Admission:    Mental Status  [x] Awake  [x] Alert  [x] Oriented  [] Quiet/Calm [] Lethargic/Sedated   [] Disoriented  [] Restless/Anxious  [] Combative   Personal Care  [] Dependent  [x] [address]  [x] Requires Assistance   Meal Preparation Ability  [] Independent   [] Standby Assistance   [] Minimal Assistance   [x] Moderate Assistance  [] Maximum Assistance     [] Total Assistance   Chores  [] Independent with Chores   [] N/A Nursing Home Resident   [x] Requires Assistance   Bowel/Bladder  [x] Continent  [] Catheter  [] Incontinent  [] Ostomy Self-Care    [] Urine Diversion Self-Care  [] Maximum Assistance     [] Total Assistance   Number of Persons needed for assistance    DME at home  [] [employer] tam  [x] Moeller Servant, Straight   [] Commode    [] Bathroom/Grab Bars  [] Hospital Bed  [] Nebulizer  [] Oxygen           [] Raised Toilet Seat  [] Shower Chair  [] Side Rails for Bed   [] Tub Transfer Bench   [] Garcia Diop  [] Jeanette La      [] Other:   Vendor      Treatment Presently Receiving:    Current Treatments  [] Chemotherapy  [] Dialysis  [] Insulin  [] IVAB [x] IVF   [] O2  [] PCA   [] PT   [] RT   [] Tube Feedings   [] Wound Care     Psychosocial Evaluation:    Verbalized Knowledge of Disease Process  [] Patient  []Family   Coping with Disease Process  [] Patient  []Family   Requires Further Counseling Coping with Disease Process  [] Patient  []Family     Identified Projected Needs:    Home Health Aid [] Yes  [x]No   Transportation  [] Yes  [x]No   Education  [] Yes  [x]No        Specific Education     Financial Counseling  [] Yes  [x]No   Inability to Care for Self/Will Require 24 hour care  [] Yes  [x]No   Pain Management  [] Yes  [x]No   Home Infusion Therapy  [] Yes  [x]No   Oxygen Therapy  [] Yes  [x]No   DME  [] Yes  [x]No   Long Term Care Placement  [] Yes  [x]No   SNF Rehab  [x] Yes  []No   Physical Therapy  [x] Yes  []No   Needs Anticipated At This Time  [x] Yes  []No     Intra-Hospital Referral:    [address] [facility]  [] Yes  [x]No     [] Yes  [x]No   Patient Representative  [] Yes  [x]No   Staff for Teaching Needs  [] Yes  [x]No   Specialty Teaching Needs     Diabetic Educator  [] Yes  [x]No   Referral for Diabetic Educator Needed  [] Yes  [x]No  If Yes, place order for Nutritionist or Diabetic Consult     Tentative Discharge Plan:    Home with No Services  [] Yes  [x]No   Home with Home Health Follow-up  [] Yes  [x]No        If Yes, specify type    Home Care Program  [] Yes  [x]No        If Yes, specify type    Meals on Wheels  [] Yes  [x]No   Office of Aging  [] Yes  [x]No   NHP  [] Yes  [x]No   Return to the Nursing Home  [] Yes  [x]No   SNF Rehab Therapy  [x] Yes  []No   Acute Rehab  [] Yes  [x]No   Subacute Rehab  [] Yes  [x]No   Private Care  [] Yes  [x]No   Substance Abuse Referral  [] Yes  [x]No   Transportation  [] Yes  [x]No   Chore Service  [] Yes  [x]No   Inpatient Hospice  [] Yes  [x]No   OP RT  [] Yes  [x] No   OP Hemo  [] Yes  [x] No   OP PT  [] Yes  [x]No   Support Group  [] Yes  [x]No   Reach to Recovery  [] Yes  [x]No   OP Oncology Clinic  [] Yes  [x]No   Clinic Appointment  [] Yes  [x]No   DME  [x] Yes  []No   Comments Rolling walker   Name of D/C Planner or  Given to Patient or Family Maximino Lee RN BSN  Outcomes Manager  Pager # [phone]   Phone Number         Extension    Date 9/4/2017   Time 0583   If you are discharged home, whom do you designate to participate in your discharge plan and receive any information needed?      Enter name of designee son        Phone # of designee         Address of designee         Updated         Patient refused to designate any           individual

## 2017-09-04 NOTE — PROGRESS NOTES
conducted an initial consultation and Spiritual Assessment for Chanelle Zuñiga, who is a 80 y.o.,female. Patients Primary Language is: [place]. According to the patients EMR Hinduism Affiliation is: Rastafari. The reason the Patient came to the hospital is:   Patient Active Problem List    Diagnosis Date Noted    CVA (cerebral vascular accident) ([facility] [address] 09/03/2017    Advance directive in chart 07/25/2016    Dementia 10/03/2013    HTN (hypertension) 11/08/2012    GERD (gastroesophageal reflux disease) 11/08/2012    Pure hypercholesterolemia 11/08/2012        The  provided the following Interventions:  Initiated a relationship of care and support with patient in room 2113 around 0842 this morning. Listened as patient talked about being here and her hopes for the future. Provided information about Spiritual Care Services. Offered prayer and assurance of continued prayers on patients behalf. The following outcomes were achieved:  Patient shared limited information about her medical narrative and spiritual journey/beliefs. Patient processed feeling about current hospitalization. Patient expressed gratitude for pastoral care visit. Assessment:  Patient does not have any Confucianist/cultural needs that will affect patients preferences in health care. There are no further spiritual or Confucianist issues which require Spiritual Care Services interventions at this time. Plan:  Chaplains will continue to follow and will provide pastoral care on an as needed/requested basis    . Laila Stone   Spiritual Care   [phone]

## 2017-09-04 NOTE — PROGRESS NOTES
Problem: Falls - Risk of  Goal: *Absence of Falls  Document Martina Fall Risk and appropriate interventions in the flowsheet.    Outcome: Progressing Towards Goal  Fall Risk Interventions:  Mobility Interventions: Patient to call before getting OOB, Assess mobility with egress test, Bed/chair exit alarm     Mentation Interventions: Bed/chair exit alarm, Adequate sleep, hydration, pain control, Door open when patient unattended, Toileting rounds, Update white board           Elimination Interventions: Call light in reach, Bed/chair exit alarm, Patient to call for help with toileting needs

## 2017-09-04 NOTE — PROGRESS NOTES
Problem: Mobility Impaired (Adult and Pediatric)  Goal: *Acute Goals and Plan of Care (Insert Text)  Physical Therapy Goals  Initiated 9/4/2017 and to be accomplished within 7 day(s)  1. Patient will move from supine to sit and sit to supine , scoot up and down and roll side to side in bed with modified independence. 2. Patient will transfer from bed to chair and chair to bed with modified independence using the least restrictive device. 3. Patient will perform sit to stand with modified independence. 4. Patient will ambulate with modified independence for 150 feet with the least restrictive device. 5. Patient will ascend/descend 4 stairs with handrail(s) with supervision/set-up. Outcome: Progressing Towards Goal  PHYSICAL THERAPY EVALUATION     Patient: Eun Carney (80 y.o. female)  Date: 9/4/2017  Primary Diagnosis: CVA (cerebral vascular accident) [facility])        Precautions: visual impairment on left   Fall      PROBLEM LIST:  Patient presents with the following problems:   Bed Mobility, Transfers, Gait, Strength, Balance, Stairs, Precautions and safety awareness due to dementia will follow directions. ASSESSMENT :   Patient requires between minimal assistance/contact guard assist and supervision/set-up for bed mobility, transfers and ambulation. Patient has limited vision on left later field limited safety and balance in standing and ambulation. Patient and son educated on plan of care and safety with mobility while in hospital needs reinforcement. No pain reported pre or post. Left in bed with call .light and bed alarm in place. Patient will benefit from skilled intervention to address the above impairments.   Patients rehabilitation potential is considered to be Fair  Factors which may influence rehabilitation potential include:   [ ]         None noted  [ ]         Mental ability/status  [X]         Medical condition  [ ]         Home/family situation and support systems  [ ]         Safety awareness  [ ]         Pain tolerance/management  [ ]         Other:        PLAN :  Recommendations and Planned Interventions:  [X]           Bed Mobility Training             [X]    Neuromuscular Re-Education  [X]           Transfer Training                   [ ]    Orthotic/Prosthetic Training  [X]           Gait Training                          [ ]    Modalities  [X]           Therapeutic Exercises          [ ]    Edema Management/Control  [X]           Therapeutic Activities            [X]    Patient and Family Training/Education  [ ]           Other (comment):     Frequency/Duration: Patient will be followed by physical therapy 1-2 times per day/4-7 days per week to address goals. Discharge Recommendations: Rehab, [address] ( first choice)  Further Equipment Recommendations for Discharge: rolling walker       SUBJECTIVE:   Patient stated I need my cat to sleep at night .       OBJECTIVE DATA SUMMARY:       Past Medical History:   Diagnosis Date    Advance directive in chart 2016    Gastrointestinal disorder      GERD (gastroesophageal reflux disease)      GERD (gastroesophageal reflux disease) 2012    HTN (hypertension) 2012    Hypercholesterolemia      Hypertension      Pure hypercholesterolemia 2012     Past Surgical History:   Procedure Laterality Date    HX GYN        HX ORTHOPAEDIC         Barriers to Learning/Limitations: yes;  sensory deficits-vision/hearing/speech, physical and altered mental status (i.e.Sedation, Confusion)  Compensate with: visual, verbal, tactile, kinesthetic cues/model     G CODES:Mobility  Current  CJ= 20-39%   Goal  CI= 1-19%.   The severity rating is based on the Other BARTHEL60/100   BARTHEL60/100  Bathin  Bladder: 5  Bowels: 5  Dressin  Feeding: 10  Groomin  Mobility: 10  Stairs: 5  Toilet Use: 5  Transfer (Bed to Chair and Back): 10  Toilet Use: 5  Transfer (Bed to Chair and Back): 10  Total: 60     Eval Complexity: History: HIGH Complexity :3+ comorbidities / personal factors will impact the outcome/ POC Exam:MEDIUM Complexity : 3 Standardized tests and measures addressing body structure, function, activity limitation and / or participation in recreation  Presentation: MEDIUM Complexity : Evolving with changing characteristics  Clinical Decision Making:Medium Complexity BARTHEL60/100 Overall Complexity:MEDIUM     Prior Level of Function/Home Situation: I with adl's and ambulation in home holding furniture and cane in [facility]: Private residence  # Steps to Enter: 4  Rails to Enter: Yes  Hand Rails : Bilateral  Wheelchair Ramp: No  One/Two Story Residence: One story (has two to three steps in home to  ascend and descend )  Living Alone: No  Support Systems: Family member(s), Child(prieto)  Patient Expects to be Discharged to[de-identified] Private residence  Current DME Used/Available at Home: Cane, straight, Grab bars  Tub or Shower Type:  (wahs up at sink )  Critical Behavior:  Neurologic State: Alert;Confused  Orientation Level: Oriented to person;Oriented to place; Disoriented to situation;Disoriented to time  Cognition: Follows commands;Poor safety awareness  Safety/Judgement: Decreased awareness of need for safety;Decreased awareness of environment;Decreased awareness of need for assistance; Fall prevention  Psychosocial  Patient Behaviors: Calm; Cooperative;Confused  Family  Behaviors: Calm; Cooperative; Appropriate for situation  Strength:    Strength: Generally decreased, functional (3+/5 both legs )  Tone & Sensation:   Normal tone   Senses limited left side vision needing cues to turn head to scan environment   Range Of Motion:  AROM: Generally decreased, functional (bothlegs end reange decreased )  Functional Mobility:  Bed Mobility:  Supine to Sit: Contact guard assistance;Stand-by asssistance; Additional time  Sit to Supine: Stand-by asssistance;Contact guard assistance; Additional time;Assist x1  Scooting: Minimum assistance; Additional time;Assist x1  Transfers:  Sit to Stand: Contact guard assistance;Minimum assistance; Additional time;Assist x1  Stand to Sit: Contact guard assistance;Minimum assistance; Additional time;Assist x1  Balance:   Sitting: Impaired  Sitting - Static: Good (unsupported); Fair (occasional)  Sitting - Dynamic: Fair (occasional)  Standing: Impaired; With support  Standing - Static: Fair  Standing - Dynamic : Fair  Ambulation/Gait Training:  Distance (ft): 25 Feet (ft) (x3)  Assistive Device: Walker, rolling  Ambulation - Level of Assistance: Minimal assistance; Moderate assistance; Additional time;Assist x1  Gait Description (WDL): Exceptions to WDL  Gait Abnormalities: Decreased step clearance; Path deviations; Shuffling gait; Step to gait  Base of Support: Center of gravity altered  Speed/Alfreda: Fluctuations  Step Length: Left shortened;Right shortened  Interventions: Safety awareness training; Tactile cues; Verbal cues; Visual/Demos  Therapeutic Exercises:   ankle pumps   Pain:  Pre treatment pain level:0  Post treatment pain level:0  Activity Tolerance:   Fair   Please refer to the flow sheet for vital signs taken during this treatment. After treatment:   [ ]         Patient left in no apparent distress sitting up in chair  [X]         Patient left in no apparent distress in bed  [X]         Call bell left within reach  [X]         Nursing notified  [X]         Caregiver present  [X]         Bed alarm activated      COMMUNICATION/EDUCATION:   [X]         Fall prevention education was provided and the patient/caregiver indicated understanding. [X]         Patient/family have participated as able in goal setting and plan of care. [X]         Patient/family agree to work toward stated goals and plan of care. [ ]         Patient understands intent and goals of therapy, but is neutral about his/her participation.   [ ]         Patient is unable to participate in goal setting and plan of care. Patient educated on the role of physical therapy during the acute stay  and the importance of mobility. VU.needs reinforcement.         Thank you for this referral.  Cedric Pearson, PT   Time Calculation: 23 mins

## 2017-09-04 NOTE — PROGRESS NOTES
Problem: Self Care Deficits Care Plan (Adult)  Goal: *Acute Goals and Plan of Care (Insert Text)  Occupational Therapy Goals  Initiated 9/4/2017 within 7 day(s). 1. Patient will perform grooming tasks while standing with modified independence. 2. Patient will perform lower body dressing with modified independence. 3. Patient will perform function task in standing for 5 minutes with modified independence to increase activity tolerance for ADLs and good safety awareness. 4. Patient will perform toilet transfers with modified independence. 5. Patient will perform all aspects of toileting with modified independence. 6. Patient will participate in upper extremity therapeutic exercise/activities with supervision/set-up for 8 minutes to increase BUE strength for ADLs. 7. Patient will utilize energy conservation techniques during functional activities with minimal verbal cues. Outcome: Progressing Towards Goal  OCCUPATIONAL THERAPY EVALUATION     Patient: Zabrina Arias (80 y.o. female)  Date: 9/4/2017  Primary Diagnosis: CVA (cerebral vascular accident) [facility])        Precautions: Fall      ASSESSMENT :  Based on the objective data described below, the patient presents with impairments with regard to bed mobility, activity tolerance, BUE strength, and independence in ADLs. Pt A&O x2, requires frequent reminders of situation and location of son. SBA/additional time to maneuver EOB; min A for balance while donning socks w/ mod vc's for activity pacing/breathing techniques. CGA for functional transfers. CGA/sup for toileting task. Pt maneuvered to bathroom w/ hand held assist & straight cane w/ max vc's for impaired vision. Supervision for oral care at sink with vc's for BUE positioning; max A to apply to toothpaste due to vision deficits. Pt returned to supine with needs within reach; bed alarm on. No c/o dizziness/pain t/o session. Recommend HH (pt lives with son) vs. SNF pending progress with therapy.  Recommend continued therapy during acute care stay. /88 at end of session; Albin Linn RN aware     Patient will benefit from skilled intervention to address the above impairments. Patients rehabilitation potential is considered to be Good  Factors which may influence rehabilitation potential include:   [ ]             None noted  [ ]             Mental ability/status  [X]             Medical condition  [ ]             Home/family situation and support systems  [ ]             Safety awareness  [ ]             Pain tolerance/management  [ ]             Other:      Recommendations for nursing: up with assist x1 w/ cane  Written on communication board: yes  Verbally communicated to: Albin Linn RN          PLAN :  Recommendations and Planned Interventions:  [X]               Self Care Training                  [X]        Therapeutic Activities  [X]               Functional Mobility Training    [ ]        Cognitive Retraining  [X]               Therapeutic Exercises           [X]        Endurance Activities  [X]               Balance Training                   [ ]        Neuromuscular Re-Education  [ ]               Visual/Perceptual Training     [X]   Home Safety Training  [X]               Patient Education                 [X]        Family Training/Education  [ ]               Other (comment):     Frequency/Duration: Patient will be followed by occupational therapy 4-7times a week to address goals. Discharge Recommendations: Home Health vs. SNF pending progress with therapy  Further Equipment Recommendations for Discharge: bedside commode, shower chair       SUBJECTIVE:   Patient stated Melvi Zayas is my son? He better be coming back.       OBJECTIVE DATA SUMMARY:       Past Medical History:   Diagnosis Date    Advance directive in chart 7/25/2016    Gastrointestinal disorder      GERD (gastroesophageal reflux disease)      GERD (gastroesophageal reflux disease) 11/8/2012    HTN (hypertension) 11/8/2012    Hypercholesterolemia      Hypertension      Pure hypercholesterolemia 11/8/2012     Past Surgical History:   Procedure Laterality Date    HX GYN        HX ORTHOPAEDIC         Barriers to Learning/Limitations: yes; vision  Compensate with: visual, verbal, tactile, kinesthetic cues/model     GCODES:  Self Care  Current  CK= 40-59%   Goal  CI= 1-19%. The severity rating is based on the Other Functional Assessment, MMT, ROM     Eval Complexity: History: MEDIUM Complexity : Expanded review of history including physical, cognitive and psychosocial  history ; Examination: MEDIUM Complexity : 3-5 performance deficits relating to physical, cognitive , or psychosocial skils that result in activity limitations and / or participation restrictions; Decision Making:LOW Complexity : No comorbidities that affect functional and no verbal or physical assistance needed to complete eval tasks      Prior Level of Function/Home Situation: Pt was independent with basic self care tasks and functional mobility PTA. Home Situation  Home Environment: Private residence  # Steps to Enter: 4  One/Two Story Residence: One story  Living Alone: No  Support Systems: Child(prieto)  Patient Expects to be Discharged to[de-identified] Private residence  Current DME Used/Available at Home: Grab bars, Cane, straight  Tub or Shower Type: Shower (pt/son report pt washes up at EOB/toilet)  [X]  Right hand dominant          [ ]  Left hand dominant     Cognitive/Behavioral Status:  Neurologic State: Alert  Orientation Level: Oriented to person;Oriented to place; Disoriented to situation;Disoriented to time  Cognition: Follows commands; Appropriate for age attention/concentration  Safety/Judgement: Decreased awareness of environment      Skin: Intact (BUEs)  Edema: None noted (BUEs)     Vision/Perceptual:    Acuity: Impaired far vision; Impaired near vision       Coordination:  Coordination: Within functional limits (BUEs)  Fine Motor Skills-Upper: Right Intact; Left Intact    Gross Motor Skills-Upper: Right Intact; Left Intact      Balance:  Sitting: Impaired  Sitting - Static: Good (unsupported)  Sitting - Dynamic: Fair (occasional)  Standing: Impaired; With support  Standing - Static: Fair  Standing - Dynamic : Fair     Strength:  Strength: Generally decreased, functional (4/5)     Range of Motion:  AROM: Generally decreased, functional (BUEs: 3/4 shoulder flex)     Functional Mobility and Transfers for ADLs:  Bed Mobility:  Supine to Sit: Stand-by asssistance  Sit to Supine: Stand-by asssistance; Additional time  Scooting: Stand-by asssistance; Additional time      Transfers:  Sit to Stand: Contact guard assistance;Minimum assistance              Toilet Transfer : Contact guard assistance; Additional time      ADL Assessment:  Feeding: Setup;Modified independent  Oral Facial Hygiene/Grooming: Setup;Modified Independent  Bathing: Minimum assistance  Upper Body Dressing: Setup;Modified independent  Lower Body Dressing: Contact guard assistance  Toileting: Contact guard assistance     ADL Intervention:  Grooming  Grooming Assistance: Supervision/set up  Brushing Teeth: Supervision/set-up  Cues: Verbal cues provided (vc's for BUE positioning for safety)     Lower Body Dressing Assistance  Dressing Assistance: Contact guard assistance;Minimum assistance (for balance)  Leg Crossed Method Used: No  Position Performed: Bending forward method;Seated edge of bed  Cues: Verbal cues provided;Physical assistance     Toileting  Toileting Assistance: Supervision/set up;Contact guard assistance  Bladder Hygiene: Supervision/set-up  Clothing Management: Contact guard assistance  Cues: Verbal cues provided  Adaptive Equipment: Other (comment) (bedside commode)     CGA to transfer to [facility] for toileting task. Supervision/CGA for clothing management/pericare. Min A (to maintain balance) during LB dressing at EOB with mod vc's for activity pacing and breathing techniques.  Supervision for oral care (max A to apply toothpaste secondary to impaired vision) while standing at sink with vc's for BUE positioning. Cognitive Retraining  Safety/Judgement: Decreased awareness of environment     Pain:  Pre treatment pain level: 0/10  Post treatment pain level: 0/10  Pain Scale 1: Numeric (0 - 10)  Pain Intensity 1: 0     Activity Tolerance:  Fair  Please refer to the flowsheet for vital signs taken during this treatment. After treatment:   [ ] Patient left in no apparent distress sitting up in chair  [X] Patient left in no apparent distress in bed  [X] Call bell left within reach  [X] Nursing notified  [ ] Caregiver present  [X] Bed alarm activated      COMMUNICATION/EDUCATION: Pt/son educated on role of OT and POC; they verbalized understanding.   [X] Home safety education was provided and the patient/caregiver indicated understanding. [X] Patient/family have participated as able in goal setting and plan of care. [X] Patient/family agree to work toward stated goals and plan of care. [ ] Patient understands intent and goals of therapy, but is neutral about his/her participation. [ ] Patient is unable to participate in goal setting and plan of care.      Thank you for this referral.     Marilou Bernstein MS OTR/L  Time Calculation: 30 mins

## 2017-09-04 NOTE — PROGRESS NOTES
Evaluation completed and formal note to follow.   Patient would benefit from snf/ rehab to work on safety with mobility may need rolling walker

## 2017-09-04 NOTE — ACP (ADVANCE CARE PLANNING)
Patient has designated __son______________________ to participate in his/her discharge plan and to receive any needed information.      Name: Brett ayers pt  Phone number: [phone]

## 2017-09-04 NOTE — CONSULTS
/     Neurology Consult Note  Admit Date: 9/3/2017  Length of Stay: 1  Primary Care: Timbo Albrecht.,    Principle Problem: CVA (cerebral vascular accident) [facility])     Assessment:    Right PCA Territory Stroke  Already has right parieto-occipital lobe encephalomalacia. New stroke in Right PCA territory. Given already significant atrophy of brain and encephalomalacia I do not think  Cerebral edema will pose a problem. Was on on aspirin before. Will attempt MRA brain w/o contrast to look for intracranial stenosis. Will get carotid U/S, and echo. Aspirin 325 mg daily  Lipitor 80 mg.   BP already well controlled. Continue BP control. History: This is a 80 year old brought in by her son on 9/3/2017 because he had noted a left facial droop. CT head showed right PCA infarct. She lives with her son and he reports a history of Dementia. She can feed herself and walks without falling at baseline. Is apparently able to clean herself by cleaning herself with soap/water and cloth but does not shower or take bath. Frequently disoriented to time and year at baseline. Results reviewed:   CT Head 9/3/2017:  1. Acute/subacute right parietal occipital lobe infarct  2. Chronic infarcts involving the right parietal occipital lobe and bilateral  basal ganglia    Tot. Chol: 244  Trigs: 43  HDL: 46  LDL: 151  Discussed with: Dr. Jacey Jackson  Allergies:    Allergies   Allergen Reactions    Pcn [Penicillins] Unable to Obtain      Review of Systems:    Y  N   Constitutional: [] [] recent weight change  [] [x] fever  [] [] sleep difficulties   ENT/Mouth:  [] [] hearing loss  [] [] swallowing problems  [] [x] slurred speech   Cardiovascular:  [] [] chest pain   [] [] palpitations    Respiratory: [] [] cough with swallow  [] [] shortness of breath  [] [] sleep apnea  [] [x] intubated   Gastrointestinal: [] [] abdominal pain  [] [] nausea   Genitourinary: [] [] frequent urination  [] [] incontinence Musculoskeletal:   [] [x] joint pain  [] [] muscle pain   Integument:   [] [] rash/itching   Neurological:  [] [] dizziness/vertigo  [] [] sedation  [] [] confusion  [] [] agitation/combativeness  [] [] loss of consciousness  [] [] numbness/tingling sensation  [] [] tremors  [] [x] weakness in limbs  [] [] difficulty with balance  [] [] frequent or recurring headaches  [] [] memory loss   [] [] comatose  [] [] seizures   Psychiatric:   [] [] depression  [] [] hallucinations   Endocrine: [] [] excessive thirst or urination   [] [] heat or cold intolerance   Hematologic/Lymphatic: [] [] bleeding tendency  [] [] enlarged lymph nodes     PMH:   Past Medical History:   Diagnosis Date    Advance directive in chart 7/25/2016    Gastrointestinal disorder     GERD (gastroesophageal reflux disease)     GERD (gastroesophageal reflux disease) 11/8/2012    HTN (hypertension) 11/8/2012    Hypercholesterolemia     Hypertension     Pure hypercholesterolemia 11/8/2012        Problem List: Principal Problem:    CVA (cerebral vascular accident) ([facility] [address] (9/3/2017)    Active Problems:    HTN (hypertension) (11/8/2012)      Dementia (10/3/2013)        FH:   Family History   Problem Relation Age of Onset   [de-identified] Cancer Father         SH:   Social History     Social History    Marital status:      Spouse name: N/A    Number of children: N/A    Years of education: N/A     Social History Main Topics    Smoking status: Never Smoker    Smokeless tobacco: Never Used    Alcohol use No    Drug use: No    Sexual activity: No     Other Topics Concern    None     Social History Narrative          Vital Signs:   Visit Vitals    /88 (BP 1 Location: Right arm, BP Patient Position: At rest;Supine)    Pulse 89    Temp 97.8 °F (36.6 °C)    Resp 18    Ht 5' 4\" (1.626 m)    Wt 73.5 kg (162 lb)    SpO2 92%    Breastfeeding No    BMI 27.81 kg/m2      Neurological examination:    Appearance:  In no acute distress, well developed, well nourished, cooperative   Cardiovascular: Heart is regular rate and rhythm   Mental status examination: Alert, awake. Not oriented to year ( reported 200. ..),  Reported she is in [place]. Not aware she is in a hospital.  Not drowsy easy to engage in conversation and follows simple commands.  Cranial Nerves:     I: smell Not tested   II: visual fields Blinks to VT on right, Does not blink to VT on left. Clearly sees fingers on the right hemifield but not counting them accurately. II: pupils Equal, round, reactive to light   III,VII: ptosis none   III,IV,VI: extraocular muscles  Full ROM   V: facial light touch sensation  normal   V,VII: corneal reflex     VII: facial muscle function  Very slight left facial droop. VIII: hearing    IX: soft palate elevation     IX,X: gag reflex    XI: sternocleidomastoid strength    XII: tongue  midline      Fundoscopic:  deferred.  Motor exam: Station, gait:  deferred. [place] paratonia. Elevates arms and legs off bed with resistance. Strength appears symmetric   Sensory: Intact to LT   Reflexes: Symmetric and 2+ in bilateral biceps and patella. No ankle reflexes present.            Medications:      [x] REVIEWED  Current Facility-Administered Medications   Medication Dose Route Frequency    insulin lispro (HUMALOG) injection   SubCUTAneous AC&HS    [START ON 9/5/2017] hydroCHLOROthiazide (HYDRODIURIL) tablet 25 mg  25 mg Oral DAILY    lisinopril (PRINIVIL, ZESTRIL) tablet 5 mg  5 mg Oral DAILY    enoxaparin (LOVENOX) injection 30 mg  30 mg SubCUTAneous Q24H    acetaminophen (TYLENOL) suppository 650 mg  650 mg Rectal Q4H PRN    ondansetron (ZOFRAN) injection 4 mg  4 mg IntraVENous Q4H PRN    aspirin (ASPIRIN) tablet 325 mg  325 mg Oral DAILY    atorvastatin (LIPITOR) tablet 80 mg  80 mg Oral QHS    hydrALAZINE (APRESOLINE) 20 mg/mL injection 10 mg  10 mg IntraVENous Q6H PRN      Data:      [x] REVIEWED  Recent Results (from the past 24 hour(s))   GLUCOSE, POC    Collection Time: 09/03/17  5:06 PM   Result Value Ref Range    Glucose (POC) 94 70 - 110 mg/dL   GLUCOSE, POC    Collection Time: 09/03/17  9:24 PM   Result Value Ref Range    Glucose (POC) 138 (H) 70 - 110 mg/dL   GLUCOSE, POC    Collection Time: 09/04/17  8:08 AM   Result Value Ref Range    Glucose (POC) 159 (H) 70 - 110 mg/dL   GLUCOSE, POC    Collection Time: 09/04/17 11:37 AM   Result Value Ref Range    Glucose (POC) 115 (H) 70 - 110 mg/dL

## 2017-09-04 NOTE — PROGRESS NOTES
Received patient from ED accompanied by ED Tech and Patience Wilkins RN. Patient is awake, alert. Patient denies any pain/ discomfort . Bed is locked and in lowest position and call bell is within reach. Advised to call for assistance and verbalized understanding. Not in acute distress. 1635  -  Dual Skin assessment done with ESTELITA Ballesteros. Patient's skin is intact except for the discoloration on lower ext.    1645  - Dual NIH scale done with Patience Wilkins RN, patient scores 3.

## 2017-09-04 NOTE — PROGRESS NOTES
Problem: Dysphagia (Adult)  Goal: *Acute Goals and Plan of Care (Insert Text)  Recommendations:  Diet: dental soft, thin liquids  Meds: whole 1 at a time  Aspiration Precautions  Oral Care TID  Outcome: Progressing Towards Goal  SPEECH LANGUAGE PATHOLOGY BEDSIDE SWALLOW EVALUATION AND DISCHARGE     Patient: Hanny Parrish (80 y.o. female)  Date: 9/4/2017  Primary Diagnosis: CVA (cerebral vascular accident) [facility])        Precautions: aspiration         ASSESSMENT :  Clinical beside swallow eval completed per MD orders. Pt A&Ox2; person and place. Functional communication. Intelligibility >90%. Son in room at end of session stating no changes noticed in cognition, language, or speech. OM examination revealed oral motor structures functional for mastication and deglutition; mild left labial droop and limited teeth present. Presented with thin liquid, puree, and solid trials. Exhibited (+) bolus cohesion, manipulation and A-P transit; mildly prolonged mastication of solids 2/2 minimal dentition. Further exhibited (+) swallow timing/reflex and hyolaryngeal excursion. Pt able to manipulate and clear with 0 clinical s/s aspiration and/or oropharyngeal dysphagia. Pt safe for dental soft solid, thin liquid diet. Patient educated on importance of safe swallowing guidelines (small bites/sips, decreased rate, alt solids/liquids, HOB >60 for all PO intake); verbalized and demonstrated comprehension. 0 formal ST needs for dysphagia indicated at this time. SLP educated pt on role of speech therapist in current setting with re: speech/swallow; verbalized comprehension. SLP available for re-evaluation if indicated by MD.     Thank you for this referral.   [id number] [facility] SLP       PLAN :  Recommendations and Planned Interventions:  Dental soft, thin liquids  No formal ST needs ID'd for dysphagia. Eval only. Discharge Recommendations: None       SUBJECTIVE:   Patient stated Im okay.       OBJECTIVE:       Past Medical History: Diagnosis Date    Advance directive in chart 7/25/2016    Gastrointestinal disorder      GERD (gastroesophageal reflux disease)      GERD (gastroesophageal reflux disease) 11/8/2012    HTN (hypertension) 11/8/2012    Hypercholesterolemia      Hypertension      Pure hypercholesterolemia 11/8/2012     Past Surgical History:   Procedure Laterality Date    HX GYN        HX ORTHOPAEDIC         Prior Level of Function/Home Situation: independent  Home Situation  Home Environment: Private residence  # Steps to Enter: 4  One/Two Story Residence: One story  Living Alone: No  Support Systems: Child(prieto), [employer], Family member(s)  Patient Expects to be Discharged to[de-identified] Private residence  Current DME Used/Available at Home: Grab bars, Cane, straight  Diet prior to admission: regular  Current Diet:  Dental soft   Cognitive and Communication Status:  Neurologic State: Alert  Orientation Level: Oriented to person, Oriented to place  Cognition: Follows commands  Perception: Cues to attend right visual field  Perseveration: No perseveration noted  Safety/Judgement: Decreased awareness of environment  Oral Assessment:  Oral Assessment  Labial: Left droop  Dentition: Intact; Limited  Oral Hygiene: good  Lingual: No impairment  Velum: No impairment  Mandible: No impairment  P.O. Trials:  Patient Position: HOB60  Vocal quality prior to P.O.: No impairment  Consistency Presented: Thin liquid;Puree; Solid  How Presented: SLP-fed/presented;Straw  How Much: 30  Bolus Acceptance: No impairment  Bolus Formation/Control: No impairment     Propulsion: No impairment  Oral Residue: None  Initiation of Swallow: No impairment  Laryngeal Elevation: Functional  Aspiration Signs/Symptoms: None  Pharyngeal Phase Characteristics: No impairment, issues, or problems   Effective Modifications: None  Cues for Modifications: None        Oral Phase Severity: Mild  Pharyngeal Phase Severity : No impairment     [facility]  Swallow Current Status CI= 1-19%   Swallow Goal Status CI= 1-19%   Swallow D/C Status CI= 1-19%     The severity rating is based on the following outcomes:  LANDON Noms Swallow               Clinical Judgement        PAIN:  Start of Eval: 0  End of Eval: 0      After evaluation:   [ ]            Patient left in no apparent distress sitting up in chair  [X]            Patient left in no apparent distress in bed  [X]            Call bell left within reach  [X]            Nursing notified  [ ]            Family present  [ ]            Caregiver present  [ ]            Bed alarm activated         COMMUNICATION/EDUCATION:   [X]            Aspiration precautions; swallow safety; compensatory techniques. [ ]            Patient/family have participated as able in goal setting and plan of care. [ ]            Patient/family agree to work toward stated goals and plan of care. [ ]            Patient understands intent and goals of therapy; neutral about participation. [ ]            Patient unable to participate in goal setting/plan of care; educ ongoing with interdisciplinary staff  [ ]             Posted safety precautions in patient's room.      Thank you for this referral.  Roya Longoria MS [facility] SLP  Time Calculation: 16 mins

## 2017-09-05 NOTE — PROGRESS NOTES
1935: Assumed patient care. Received report from Isabela Reese [facility]. Report included SBAR, Kardex, and MAR. Patient denies pain, in no sign of distress. Bed alarm on, bed in lowest position. Call light within reach. 0740: Bedside and Verbal shift change report given to Cydney Lopez RN (oncoming nurse) by Marylen Deis, RN (offgoing nurse). Report included the following information SBAR, Kardex and MAR.

## 2017-09-05 NOTE — PROCEDURES
[facility]/[address]  *** FINAL REPORT ***    Name: Estefani Islas  MRN: [id number]    Inpatient  : 1925  HIS Order #: [id number]  [address] [facility] Visit #: [id number]  Date: 05 Sep 2017    TYPE OF TEST: Cerebrovascular Duplex    REASON FOR TEST  Stroke    Right Carotid:-             Proximal               Mid                 Distal  cm/s  Systolic  Diastolic  Systolic  Diastolic  Systolic  Diastolic  CCA:     79.6      12.0       69.0      18.0       54.0      15.0  Bulb:  ECA:    120.0       9.0  ICA:     69.0      17.0       62.0      16.0       61.0      19.0  ICA/CCA:  1.0       0.9    ICA Stenosis: Normal    Right Vertebral:-  Finding: Antegrade  Sys:       62.0  Elyse:       12.0    Right Subclavian: Normal    Left Carotid:-            Proximal                Mid                 Distal  cm/s  Systolic  Diastolic  Systolic  Diastolic  Systolic  Diastolic  CCA:     94.3      12.0       64.0      12.0       43.0       9.0  Bulb:  ECA:    401.0      28.0  ICA:     45.0      12.0       53.0      16.0       73.0      22.0  ICA/CCA:  0.7       1.8    ICA Stenosis: Normal    Left Vertebral:-  Finding: Antegrade  Sys:       36.0  Elyse:       12.0    Left Subclavian: Normal    INTERPRETATION/FINDINGS  1. No evidence of significant plaque or stenosis in the internal  carotid arteries. 2. Hemodynamically significant stenosis in the left external carotid  artery. 3. No significant stenosis in the right external carotid artery. 4. Antegrade flow in both vertebral arteries. 5. Normal flow in both subclavian arteries. ADDITIONAL COMMENTS    I have personally reviewed the data relevant to the interpretation of  this  study. TECHNOLOGIST: Pete Wilkins  Signed: 2017 12:47 PM    PHYSICIAN: Guillermo Keller MD  Signed: 2017 01:08 PM

## 2017-09-05 NOTE — PROGRESS NOTES
Problem: Falls - Risk of  Goal: *Absence of Falls  Document Martina Fall Risk and appropriate interventions in the flowsheet.    Outcome: Progressing Towards Goal  Fall Risk Interventions:  Mobility Interventions: Patient to call before getting OOB     Mentation Interventions: Bed/chair exit alarm           Elimination Interventions: Call light in reach

## 2017-09-05 NOTE — INTERDISCIPLINARY ROUNDS
IDR Summary      Patient: Korin Bowling MRN: [id number]    Age: 80 y.o.  : 1925     Admit Diagnosis: CVA (cerebral vascular accident) [facility])      Problems pertinent to hospital stay: Left sided blindness     Consults:P.T, O.T., Speech and Case Management     Testing due for patient today?  YES    Nutrition plan:Yes     Mobility needs: Yes      Lines/Tubes:   IV: YES   Needed: YES  Aly: NO  Needed:NO  Central Line: NO Needed: NO      VTE Prophylaxis: Chemical            Care Management:  Discharge plan: SNF   PCP: Vivi Jaffe DO    : NO  Financial concerns:No   Interventions:       LOS: 2 days     Expected days until discharge: SNF tomorrow        Signed:     DONNELL Trevizo-BC  [address]  Hospitalist Division  Pager:  [phone]  Office:  [phone]

## 2017-09-05 NOTE — PROGRESS NOTES
Spoke with pts son, he wants her to go to snf. . [facility] is looking at her, will let me know if can accept. She needs one more inpt night  Before can dc to snf. Explained ins coverage to son and  Difference between op PT, hh PT and snf.

## 2017-09-05 NOTE — PROGRESS NOTES
Neurology Progress Note    Admit Date: 9/3/2017  Length of Stay: 2  Primary Care: Yvette Donovan., DO       Assessment:    Principle Problem: CVA (cerebral vascular accident) [facility])     Problem List: Principal Problem:    CVA (cerebral vascular accident) ([facility] [address] (9/3/2017)    Active Problems:    HTN (hypertension) (11/8/2012)      Dementia (10/3/2013)        Plan:    Right PCA Territory Stroke  Already has right parieto-occipital lobe encephalomalacia. New stroke in Right PCA territory. Given already significant atrophy of brain and encephalomalacia I do not think  Cerebral edema will pose a problem. Was on on aspirin before. Will attempt MRA brain w/o contrast to look for intracranial stenosis however the entire right PCA is looks completely infarcted so likely will not significantly change. carotid U/S, and echo unrevealing. Aspirin 325 mg daily  Lipitor 80 mg.   BP already well controlled. Continue BP control. Ok to go to [facility] tomorrow even if unable to obtain MRA    Interim History: Awake and responsive. Still does not know day, date  Results reviewed:   Echo and doppler     Discussed with: PA    Allergies: Allergies   Allergen Reactions    Pcn [Penicillins] Unable to Obtain         Vital Signs:   Visit Vitals    /52 (BP 1 Location: Left arm, BP Patient Position: At rest)    Pulse 77    Temp 99 °F (37.2 °C)    Resp 16    Ht 5' 4\" (1.626 m)    Wt 73.5 kg (162 lb)    SpO2 95%    Breastfeeding No    BMI 27.81 kg/m2        Neurological examination:   · Appearance: In no acute distress, well developed, well nourished, cooperative  · Cardiovascular: Heart is regular rate and rhythm  · Mental status examination: Alert, awake. Not oriented to year ( reported 200. ..),  Reported she is in [employer] worth. Not aware she is in a hospital.  Not drowsy easy to engage in conversation and follows simple commands.    · Cranial Nerves:      I: smell Not tested   II: visual fields Blinks to VT on right, Does not blink to VT on left. Clearly sees fingers on the right hemifield but not counting them accurately. II: pupils Equal, round, reactive to light   III,VII: ptosis none   III,IV,VI: extraocular muscles  Full ROM   V: facial light touch sensation  normal   V,VII: corneal reflex      VII: facial muscle function  Very slight left facial droop. VIII: hearing     IX: soft palate elevation      IX,X: gag reflex     XI: sternocleidomastoid strength     XII: tongue  midline      · Fundoscopic:  deferred. · Motor exam: Station, gait:  deferred. [place] paratonia. Elevates arms and legs off bed with resistance.  Strength appears symmetric  · Sensory: Intact to LT      Review of Systems:   Y  N   Constitutional: [] [] recent weight change  [] [x] fever  [] [] sleep difficulties   ENT/Mouth:  [] [] hearing loss  [] [] swallowing problems   Cardiovascular:  [] [] chest pain   [] [] palpitations    Respiratory: [] [] cough   [] [] shortness of breath  [] [] sleep apnea  [] [] intubated   Gastrointestinal: [] [] abdominal pain  [] [x] nausea   Genitourinary: [] [] frequent urination  [] [] incontinence    Musculoskeletal:   [] [] joint pain  [] [] muscle pain   Integument:   [] [] rash/itching   Neurological:  [] [] dizziness/vertigo  [] [x] speech problems  [] [] language difficulty  [] [] sedation  [] [] confusion  [] [] agitation/combativeness  [] [] loss of consciousness  [] [] numbness/tingling sensation  [] [] tremors  [] [x] weakness in limbs  [] [] difficulty with balance  [] [] frequent or recurring headaches  [x] [] memory loss   [] [] comatose  [] [] seizures   Psychiatric:   [] [] depression  [] [] hallucinations           PMH:   Past Medical History:   Diagnosis Date    Advance directive in chart 7/25/2016    Gastrointestinal disorder     GERD (gastroesophageal reflux disease)     GERD (gastroesophageal reflux disease) 11/8/2012    HTN (hypertension) 11/8/2012    Hypercholesterolemia     Hypertension     Pure hypercholesterolemia 11/8/2012      FH:   Family History   Problem Relation Age of Onset    Cancer Father       SH:   Social History     Social History    Marital status:      Spouse name: N/A    Number of children: N/A    Years of education: N/A     Social History Main Topics    Smoking status: Never Smoker    Smokeless tobacco: Never Used    Alcohol use No    Drug use: No    Sexual activity: No     Other Topics Concern    None     Social History Narrative          Medications:    [] REVIEWED  Current Facility-Administered Medications   Medication Dose Route Frequency    insulin lispro (HUMALOG) injection   SubCUTAneous AC&HS    hydroCHLOROthiazide (HYDRODIURIL) tablet 25 mg  25 mg Oral DAILY    lisinopril (PRINIVIL, ZESTRIL) tablet 5 mg  5 mg Oral DAILY    enoxaparin (LOVENOX) injection 30 mg  30 mg SubCUTAneous Q24H    acetaminophen (TYLENOL) suppository 650 mg  650 mg Rectal Q4H PRN    ondansetron (ZOFRAN) injection 4 mg  4 mg IntraVENous Q4H PRN    aspirin (ASPIRIN) tablet 325 mg  325 mg Oral DAILY    atorvastatin (LIPITOR) tablet 80 mg  80 mg Oral QHS    hydrALAZINE (APRESOLINE) 20 mg/mL injection 10 mg  10 mg IntraVENous Q6H PRN      Data:      [x] REVIEWED  Recent Results (from the past 24 hour(s))   GLUCOSE, POC    Collection Time: 09/04/17  5:16 PM   Result Value Ref Range    Glucose (POC) 110 70 - 110 mg/dL   GLUCOSE, POC    Collection Time: 09/04/17  9:42 PM   Result Value Ref Range    Glucose (POC) 118 (H) 70 - 110 mg/dL   GLUCOSE, POC    Collection Time: 09/05/17  8:59 AM   Result Value Ref Range    Glucose (POC) 105 70 - 110 mg/dL   GLUCOSE, POC    Collection Time: 09/05/17 11:45 AM   Result Value Ref Range    Glucose (POC) 137 (H) 70 - 110 mg/dL

## 2017-09-05 NOTE — PROGRESS NOTES
Problem: Mobility Impaired (Adult and Pediatric)  Goal: *Acute Goals and Plan of Care (Insert Text)  Physical Therapy Goals  Initiated 9/4/2017 and to be accomplished within 7 day(s)  1. Patient will move from supine to sit and sit to supine , scoot up and down and roll side to side in bed with modified independence. 2. Patient will transfer from bed to chair and chair to bed with modified independence using the least restrictive device. 3. Patient will perform sit to stand with modified independence. 4. Patient will ambulate with modified independence for 150 feet with the least restrictive device. 5. Patient will ascend/descend 4 stairs with handrail(s) with supervision/set-up. Outcome: Progressing Towards Goal  PHYSICAL THERAPY TREATMENT     Patient: Donna Bustillo (80 y.o. female)  Date: 9/5/2017  Diagnosis: CVA (cerebral vascular accident) [facility]) CVA (cerebral vascular accident) [facility])       Precautions: Fall (visual impairment  left side )  Chart, physical therapy assessment, plan of care and goals were reviewed. ASSESSMENT:  Patient in bed and reported she thought she had went to the bathroom. Moved to [facility] and then ambulated with rw and cga with verbal cues to maintain upright posture and staying inside walker. Needing cues to look to the left to scan environment for  Mobility. Patient constantly needing to know that son was present  For reassurance. Education on scanning environment for visual landmarks and to avoid obstacles needs reinforcment. Progression toward goals:  [ ]      Improving appropriately and progressing toward goals  [X]      Improving slowly and progressing toward goals  [ ]      Not making progress toward goals and plan of care will be adjusted       PLAN:  Patient continues to benefit from skilled intervention to address the above impairments. Continue treatment per established plan of care.   Discharge Recommendations:  Rehab and [facility] Recommendations for Discharge:  rolling walker       SUBJECTIVE:   Patient stated .      OBJECTIVE DATA SUMMARY:   Critical Behavior:  Neurologic State: Alert  Orientation Level: Oriented to person, Oriented to place  Cognition: Follows commands  Safety/Judgement: Fall prevention  Functional Mobility Training:  Bed Mobility:  Supine to Sit: Minimum assistance;Contact guard assistance; Additional time;Assist x1  Sit to Supine: Contact guard assistance;Minimum assistance; Additional time;Assist x1  Transfers:  Sit to Stand: Contact guard assistance;Minimum assistance; Additional time;Assist x1  Stand to Sit: Contact guard assistance;Minimum assistance; Additional time;Assist x1  Balance:  Sitting: Impaired  Sitting - Static: Fair (occasional); Good (unsupported)  Sitting - Dynamic: Fair (occasional)  Standing: Impaired  Standing - Static: Fair  Standing - Dynamic : Fair  Ambulation/Gait Training:  Distance (ft): 25 Feet (ft) (x2)  Assistive Device: Walker, rolling  Ambulation - Level of Assistance: Minimal assistance;Contact guard assistance; Additional time;Assist x1  Gait Abnormalities: Decreased step clearance; Path deviations; Shuffling gait  Base of Support: Center of gravity altered  Speed/Alfreda: Delayed;Pace decreased (<100 feet/min); Slow;Shuffled  Step Length: Left shortened;Right shortened  Interventions: Safety awareness training; Tactile cues; Verbal cues; Visual/Demos        Neuro Re-Education:  Upright posture in sitting and standing   Pain: 0 pre and post   Pain Scale 1: Numeric (0 - 10)  Pain Intensity 1: 0  Activity Tolerance:   Fair   Please refer to the flowsheet for vital signs taken during this treatment.   After treatment:   [ ] Patient left in no apparent distress sitting up in chair  [X] Patient left in no apparent distress in bed  [X] Call bell left within reach  [X] Nursing notified  [X] Caregiver present  [ ] Bed alarm activated      Alexandria Graves PT   Time Calculation: 20 mins

## 2017-09-05 NOTE — PROGRESS NOTES
Medicine Progress Note    Patient: Debbie Brandon   Age:  80 y.o.  DOA: 9/3/2017   Admit Dx / CC: CVA (cerebral vascular accident) ([facility] [address]  LOS:  LOS: 2 days     Assessment/Plan   Principal Problem:    CVA (cerebral vascular accident) ([address] (9/3/2017)    Active Problems:    HTN (hypertension) (11/8/2012)      Dementia (10/3/2013)        Additional Plan notes   Patient has a PICA CVA, Aline Webster says no intervention. Neuro wants MRA to determine if Plavix is needed. PT/OT following we are also awaiting echo and carotid. At this point can go to snf as early as tomorrow if all tests done and meds finalized. HTN                        - PRN hydralazine, low dose aceI and hctz started today     CVA                        - No intervention as per tele neuro/baker                                      - ASA, STATIN, Speech cleared patient , OT PT, plan rehab Wednesday . Felice Tai q 4 NIH. Echo pending  MRA pending                          - tele monitor              DISPO     Anticipated Date of Discharge: 1 days  Anticipated Disposition (home, SNF) :snf    Subjective:   Patient seen and examined. No complaints today, spoke to family at bedside    Objective:     Visit Vitals    /75    Pulse 85    Temp 98 °F (36.7 °C)    Resp 15    Ht 5' 4\" (1.626 m)    Wt 73.5 kg (162 lb)    SpO2 96%    Breastfeeding No    BMI 27.81 kg/m2       Physical Exam:  General appearance: alert, cooperative, no distress, appears stated age  Head: Normocephalic, without obvious abnormality, atraumatic  Neck: supple, trachea midline  Lungs: clear to auscultation bilaterally  Heart: regular rate and rhythm, S1, S2 normal, no murmur, click, rub or gallop  Abdomen: soft, non-tender.  Bowel sounds normal. No masses,  no organomegaly  Extremities: extremities normal, atraumatic, no cyanosis or edema  Skin: Skin color, texture, turgor normal. No rashes or lesions  Neurologic: Grossly normal    Intake and Output:  Current Shift:     Last three shifts:  09/03 1901 - 09/05 0700  In: 650 [I.V.:650]  Out: 250 [Urine:250]    Lab/Data Reviewed:  CMP: No results found for: NA, K, CL, CO2, AGAP, GLU, BUN, CREA, GFRAA, GFRNA, CA, MG, PHOS, ALB, TBIL, TP, ALB, GLOB, AGRAT, SGOT, ALT, GPT  CBC: No results found for: WBC, HGB, HGBEXT, HCT, HCTEXT, PLT, PLTEXT, HGBEXT, HCTEXT, PLTEXT  All Cardiac Markers in the last 24 hours: No results found for: CPK, CKMMB, CKMB, RCK3, CKMBT, CKNDX, CKND1, ANGIE, TROPT, TROIQ, AUREA, TROPT, TNIPOC, BNP, BNPP    Medications Reviewed:  Current Facility-Administered Medications   Medication Dose Route Frequency    insulin lispro (HUMALOG) injection   SubCUTAneous AC&HS    hydroCHLOROthiazide (HYDRODIURIL) tablet 25 mg  25 mg Oral DAILY    lisinopril (PRINIVIL, ZESTRIL) tablet 5 mg  5 mg Oral DAILY    enoxaparin (LOVENOX) injection 30 mg  30 mg SubCUTAneous Q24H    acetaminophen (TYLENOL) suppository 650 mg  650 mg Rectal Q4H PRN    ondansetron (ZOFRAN) injection 4 mg  4 mg IntraVENous Q4H PRN    aspirin (ASPIRIN) tablet 325 mg  325 mg Oral DAILY    atorvastatin (LIPITOR) tablet 80 mg  80 mg Oral QHS    hydrALAZINE (APRESOLINE) 20 mg/mL injection 10 mg  10 mg IntraVENous Q6H PRN       Yojana Burrell MD    September 5, 2017

## 2017-09-05 NOTE — PROGRESS NOTES
Problem: Falls - Risk of  Goal: *Absence of Falls  Document Martina Fall Risk and appropriate interventions in the flowsheet.    Outcome: Progressing Towards Goal  Fall Risk Interventions:  Mobility Interventions: Patient to call before getting OOB, Assess mobility with egress test, Bed/chair exit alarm     Mentation Interventions: Bed/chair exit alarm, Adequate sleep, hydration, pain control, Door open when patient unattended, Toileting rounds, Update white board           Elimination Interventions: Call light in reach, Bed/chair exit alarm, Patient to call for help with toileting needs                 Problem: Ischemic Stroke: 0-24 hours  Goal: *Verbalizes anxiety and depression are reduced or absent  Outcome: Not Progressing Towards Goal  Variance: Patient Condition

## 2017-09-05 NOTE — CDMP QUERY
Please clarify if this patient is being treated/managed for:    =>IVANA or CKD    Below shows the five stages of CKD and GFR for each stage:  § Stage 1 with normal or high GFR (GFR > 90 mL/min)  § Stage 2 Mild CKD (GFR = 60-89 mL/min)  § Stage 3A Moderate CKD (GFR = 45-59 mL/min)  § Stage 3B Moderate CKD (GFR = 30-44 mL/min)  § Stage 4 Severe CKD (GFR = 15-29 mL/min)  § Stage 5 End Stage CKD (GFR <15 mL/min)      =>Other Explanation of clinical findings  =>Unable to Determine (no explanation of clinical findings)    The medical record reflects the following:    Risk: 81 yo female with PMH HTN  D5  Clinical Indicators: 9/3 BUN 27 creatinine 1.31  GFR 38  07/20/17 BUN 27  creatinine 1.25  GFR 40     Treatment: IV fluids D5 .45 NS , serial labs    Please clarify and document your clinical opinion in the progress notes and discharge summary including the definitive and/or presumptive diagnosis, (suspected or probable), related to the above clinical findings. Please include clinical findings supporting your diagnosis. If you DECLINE this query or would like to communicate with [facility], please utilize the \"[employer] message box\" at the TOP of the Progress Note on the right.       Thank you,  Madiha Eng RN [phone]

## 2017-09-05 NOTE — PROGRESS NOTES
2 attempts for OT tx session. 1048: pt sleeping, not fully waking to multiple tactile/verbal cues. Son requesting I come back so pt can continue sleeping. 1140: pt eating lunch. Will follow up.     Sanjana Arias MS OTR/L  Office Ext: [phone]  Pager: [phone]

## 2017-09-06 NOTE — PROGRESS NOTES
2350: Bedside verbal shift report received from [facility], RN. Pt is awake in bed, no signs of distress, instructed to press call light if assistance is needed, call light within reach. 2005: Bedside and Verbal shift change report given to Mynor Carter RN and Frankie Reyna RN (oncoming nurse) by René Martínez RN (offgoing nurse). Report included the following information SBAR, Kardex, Intake/Output, MAR and Recent Results.

## 2017-09-06 NOTE — PROGRESS NOTES
Problem: Falls - Risk of  Goal: *Absence of Falls  Document Martina Fall Risk and appropriate interventions in the flowsheet.    Outcome: Progressing Towards Goal  Fall Risk Interventions:  Mobility Interventions: Bed/chair exit alarm, Patient to call before getting OOB     Mentation Interventions: Bed/chair exit alarm, Adequate sleep, hydration, pain control, Eyeglasses and hearing aids, Door open when patient unattended, More frequent rounding, Reorient patient           Elimination Interventions: Bed/chair exit alarm, Call light in reach

## 2017-09-06 NOTE — PROGRESS NOTES
Problem: Mobility Impaired (Adult and Pediatric)  Goal: *Acute Goals and Plan of Care (Insert Text)  Physical Therapy Goals  Initiated 9/4/2017 and to be accomplished within 7 day(s)  1. Patient will move from supine to sit and sit to supine , scoot up and down and roll side to side in bed with modified independence. 2. Patient will transfer from bed to chair and chair to bed with modified independence using the least restrictive device. 3. Patient will perform sit to stand with modified independence. 4. Patient will ambulate with modified independence for 150 feet with the least restrictive device. 5. Patient will ascend/descend 4 stairs with handrail(s) with supervision/set-up. Outcome: Progressing Towards Goal  PHYSICAL THERAPY TREATMENT     Patient: Valarie Schwartz (80 y.o. female)  Date: 9/6/2017  Diagnosis: CVA (cerebral vascular accident) [facility]) CVA (cerebral vascular accident) [facility])  Precautions: Fall  Chart, physical therapy assessment, plan of care and goals were reviewed. ASSESSMENT:  Mod A for supine to sit transfer; requires assist with trunk. Seated EOB with fair balance; requires supervision only. Min A for sit to stand from EOB. Cues to place LUE on ww once standing; requires hand over hand placement. Amb 3 ft from bed to chair with min A and ww. Verbal and tactile cues for sequencing. Cues for safe negotaition of AD; keeping LE within walker when moving. Seated in chair all needs in reach. Educated on call bell by JOSY; verbalized understanding. RN and CNA aware. EDUCATION: bed mobility, transfers, balance, attention, safety  Progression toward goals:        Improving appropriately and progressing toward goals       PLAN:  Patient continues to benefit from skilled intervention to address the above impairments. Continue treatment per established plan of care.   Discharge Recommendations:  Rehab  Further Equipment Recommendations for Discharge:  rolling walker       SUBJECTIVE: Agreeable to PT      OBJECTIVE DATA SUMMARY:   Critical Behavior:  Neurologic State: Drowsy, Eyes open to voice  Orientation Level: Oriented to person  Cognition: Decreased attention/concentration  Safety/Judgement: Awareness of environment     [employer] Balance Scale 1+/5  0: Pt performs 25% or less of standing activity (Max assist) CN, 100% impaired. 1: Pt supports self with upper extremities but requires therapist assistance. Pt performs 25-50% of effort (Mod assist) CM, 80% to <100% impaired. 1+: Pt supports self with upper extremities but requires therapist assistance. Pt performs >50% effort. (Min assist). CL, 60% to <80% impaired. 2: Pt supports self independently with both upper extremities (walker, crutches, parallel bars). CL, 60% to <80% impaired. 2+: Pt support self independently with 1 upper extremity (cane, crutch, 1 parallel bar). CK, 40% to <60% impaired. 3: Pt stands without upper extremity support for up to 30 seconds. CK, 40% to <60% impaired. 3+: Pt stands without upper extremity support for 30 seconds or greater. CJ, 20% to <40% impaired. 4: Pt independently moves and returns center of gravity 1-2 inches in one plane. CJ, 20% to <40% impaired. 4+: Pt independently moves and returns center of gravity 1-2 inches in multiple planes. CI, 1% to <20% impaired. 5: Pt independently moves and returns center of gravity in all planes greater than 2 inches. CH, 0% impaired. G CODE:Mobility [id number] Current  CL= 60-79%   Goal  CJ= 20-39%. The severity rating is based on the Other [employer] Balance Scale 1+/5  Functional Mobility Training:  Bed Mobility:  Supine to Sit: Moderate assistance  Sit to Supine:  (seated in recliner)  Scooting:  Moderate assistance  Transfers:  Sit to Stand: Minimum assistance  Stand to Sit: Minimum assistance  Bed to Chair: Minimum assistance  Balance:  Sitting: Impaired  Sitting - Static: Fair (occasional)  Sitting - Dynamic: Fair (occasional)  Standing: Impaired  Standing - Static: Fair  Standing - Dynamic : Fair  Ambulation/Gait Training:  Distance (ft): 3 Feet (ft)  Assistive Device: Walker, rolling  Ambulation - Level of Assistance: Minimal assistance  Neuro Re-Education:  Sitting EOB  Standing balance  Therapeutic Exercises:   Sit to stand  Bed to chair transfer  Vital Signs  Temp: 98.8 °F (37.1 °C)     Pulse (Heart Rate): 71     BP: 121/56     Resp Rate: 15     O2 Sat (%): 92 %  Pain:  Pain Scale 1: Numeric (0 - 10)  Pain Intensity 1: 0  Activity Tolerance:   Fair     After treatment:   Patient left in no apparent distress sitting up in chair  Call bell left within reach  Nursing notified      Saul Dillon PT   Time Calculation: 9 mins

## 2017-09-06 NOTE — DISCHARGE SUMMARY
[facility]    Discharge Summary    Patient: Donnell Marroquin MRN: [id number]  CSN: [id number]    YOB: 1925  Age: 80 y.o. Sex: female    DOA: 9/3/2017 LOS:  LOS: 3 days   Discharge Date: 9/6/2017     Admission Diagnoses: CVA (cerebral vascular accident) [facility])    Discharge Diagnoses:    Problem List as of 9/6/2017  Date Reviewed: 7/26/2017          Codes Class Noted - Resolved    * (Principal)CVA (cerebral vascular accident) [facility]) ICD-10-CM: I63.9  ICD-9-CM: 434.91  9/3/2017 - Present        Advance directive in chart ICD-10-CM: Z78.9  ICD-9-CM: V49.89  7/25/2016 - Present        Dementia ICD-10-CM: F03.90  ICD-9-CM: 294.20  10/3/2013 - Present        HTN (hypertension) ICD-10-CM: I10  ICD-9-CM: 401.9  11/8/2012 - Present        GERD (gastroesophageal reflux disease) ICD-10-CM: K21.9  ICD-9-CM: 530.81  11/8/2012 - Present        Pure hypercholesterolemia ICD-10-CM: E78.00  ICD-9-CM: 272.0  11/8/2012 - Present              Discharge Condition: Stable    Discharge To: SNF    Consults: Hospitalist and Neurology    Hospital Course: Donnell Marroquin is a 80y.o. year old female who presented with CVA. Patient has history of likely TIA in past. She has hx of HTN. Yesterday the patient's son noted complaints of R facial pain, used warm compress thinking it was sinus pain. This am, she complained of congestion until after breakfast when he said she was confused and he noted she was less stable on her feet. She usually uses a cane and if she doesn't should be (b/l ankle breaks years ago). On way to hospital noted L facial droop. She was having some problems using straw. CT Head showed Acute/subacute right parietal occipital lobe infarct. Chronic infarcts involving the right parietal occipital lobe and bilateral basal ganglia. . Patient was admitted for stroke workup. Per ED no intervention by Debra Bennett. Neurology was consulted. Patient underwent MRA which showed 1.  Occlusion proximal right PCA correlating with right PCA territory infarct. Mild adjacent mass effect is seen. 2. Additional multifocal areas of anterior and posterior circulation stenosis including moderate to severe proximal left PCA, severe left SHANE, and distal right M1 segment. 3. No intracranial aneurysm is seen. Neurology recommended patient take  mg for 90 days along with 75 mg Plavix and after 90 days stop Plavix and continue with ASA. Patient was evaluated by PT/OT/ST and recommended for SNF placement. Patient is stable for discharge to SNF.        Physical Exam:  General appearance: alert to person and place, cooperative   Lungs: clear to auscultation bilaterally, no wheezes or rhonchi   Heart: regular rate and rhythm, S1, S2 normal, no murmur, click, rub or gallop  Abdomen: soft, non-tender. Bowel sounds normal  Extremities: extremities normal, atraumatic, no cyanosis or edema  Skin: Skin color, texture, turgor normal. No rashes or lesions  Neurologic: Right 5/5, Left 4+/5. LLE sensation       Significant Diagnostic Studies:   Recent Results (from the past 12 hour(s))   GLUCOSE, POC    Collection Time: 09/06/17  8:12 AM   Result Value Ref Range    Glucose (POC) 112 (H) 70 - 110 mg/dL   GLUCOSE, POC    Collection Time: 09/06/17 11:50 AM   Result Value Ref Range    Glucose (POC) 109 70 - 110 mg/dL     CT Head 9/3/2017:  IMPRESSION:     1. Acute/subacute right parietal occipital lobe infarct  2. Chronic infarcts involving the right parietal occipital lobe and bilateral basal ganglia. MRA Brain 9/5/2017:  IMPRESSION:     1. Occlusion proximal right PCA correlating with right PCA territory infarct. Mild adjacent mass effect is seen. 2. Additional multifocal areas of anterior and posterior circulation stenosis including moderate to severe proximal left PCA, severe left SHANE, and distal right M1 segment. 3. No intracranial aneurysm is seen.     Discharge Medications:     Current Discharge Medication List      START taking these medications    Details aspirin (ASPIRIN) 325 mg tablet Take 1 Tab by mouth daily. Qty: 30 Tab, Refills: 0      atorvastatin (LIPITOR) 80 mg tablet Take 1 Tab by mouth nightly. Qty: 30 Tab, Refills: 0      hydroCHLOROthiazide (HYDRODIURIL) 25 mg tablet Take 1 Tab by mouth daily. Qty: 30 Tab, Refills: 0      lisinopril (PRINIVIL, ZESTRIL) 5 mg tablet Take 1 Tab by mouth daily. Qty: 30 Tab, Refills: 0      insulin lispro (HUMALOG) 100 unit/mL injection Normal Insulin Sensitivity   Blood Sugar (mg/dL) of:   Less than 150 =   0 units           150 -199 =   2 units  200 -249 =   4 units  250 -299 =   6 units  300 -349 =   8 units  350 and above =   10 units  Initiate Hypoglycemia protocol if blood glucose is <70 mg/dL  Qty: 1 Vial, Refills: 0      clopidogrel (PLAVIX) 75 mg tab Take 1 Tab by mouth daily for 90 days. Qty: 30 Tab, Refills: 2         STOP taking these medications       valsartan-hydroCHLOROthiazide (DIOVAN HCT) 80-12.5 mg per tablet Comments:   Reason for Stopping:               Activity: PT/OT Eval and Treat    Diet: Dental soft     Wound Care: None needed    Follow-up: Facility provider on admission  PCP one week after discharge from SNF     Discharge time: 45 minutes   Delaney Duarte NP  9/6/2017, 12:54 PM      I examined the patient , reviewed the history, labs, and radiology reports  independently. I have reviewed the NP documentation, agree with the documentation, medical decision making and treatment plan as outlined by my NP.     Maya Foote MD

## 2017-09-06 NOTE — PROGRESS NOTES
TRANSFER to [facility] Verbal report given to Rachele RN(TCC) from [place] (2W) on Monik Goodwin  being transferred to [facility](unit) for routine progression of care       Report consisted of patients Situation, Background, Assessment and   Recommendations(SBAR). Information from the following report(s) SBAR, Kardex, Intake/Output, MAR and Recent Results was reviewed with the receiving nurse. Lines:       Opportunity for questions and clarification was provided.       Patient transported with:   [email or web address]

## 2017-09-06 NOTE — PROGRESS NOTES
Problem: Falls - Risk of  Goal: *Absence of Falls  Document Martina Fall Risk and appropriate interventions in the flowsheet.    Outcome: Progressing Towards Goal  Fall Risk Interventions:  Mobility Interventions: Bed/chair exit alarm     Mentation Interventions: Bed/chair exit alarm           Elimination Interventions: Call light in reach, Bed/chair exit alarm

## 2017-09-06 NOTE — PROGRESS NOTES
0700 Bedside and Verbal shift change report received  offgoing nurse. Report included the following information SBAR and Kardex. 1000 Son in room with the pt visiting.   26 Dr. Fox Army in to see the pt.

## 2017-09-06 NOTE — PROGRESS NOTES
Physical Exam   Skin:           Primary Nurse Maximino Olivarez and Mayco Johnson , RN performed a dual skin assessment on this patient Impairment noted- see wound doc flow sheet  [place] score is 17

## 2017-09-06 NOTE — ANCILLARY DISCHARGE INSTRUCTIONS
Patient given a copy of second Medicare rights letter, patient would like for her son to be aware, called patient's son made him aware of Medicare rights letter. Patient and son aware.

## 2017-09-06 NOTE — PROGRESS NOTES
Assumed care. Off floor for procedure. 1400: Assumed care; Pt orientated to person and place; resting in bed; no distress noted; Pt denies pain; Bed alarm on; bed in low position; Side rails up x 3; Call light and personal belonging within reach. Will continue to monitor. 1500:Frequency and Urgency noted with pt. Bladder scan pre-void: 189ml, post void 98ml. Will continue to monitor and assist pt with bathroom needs.

## 2017-09-06 NOTE — PROGRESS NOTES
Problem: Self Care Deficits Care Plan (Adult)  Goal: *Acute Goals and Plan of Care (Insert Text)  Occupational Therapy Goals  Initiated 9/4/2017 within 7 day(s). 1. Patient will perform grooming tasks while standing with modified independence. 2. Patient will perform lower body dressing with modified independence. 3. Patient will perform function task in standing for 5 minutes with modified independence to increase activity tolerance for ADLs and good safety awareness. 4. Patient will perform toilet transfers with modified independence. 5. Patient will perform all aspects of toileting with modified independence. 6. Patient will participate in upper extremity therapeutic exercise/activities with supervision/set-up for 8 minutes to increase BUE strength for ADLs. 7. Patient will utilize energy conservation techniques during functional activities with minimal verbal cues. Outcome: Progressing Towards Goal  OCCUPATIONAL THERAPY TREATMENT     Patient: Monik Goodwin (80 y.o. female)  Date: 9/6/2017  Diagnosis: CVA (cerebral vascular accident) [facility]) CVA (cerebral vascular accident) [facility])       Precautions: Fall  Chart, occupational therapy assessment, plan of care, and goals were reviewed. ASSESSMENT: Pt supine with HOB elevated, agreeable to therapy. Min/mod A to maneuver to EOB. Max cueing to maintain midline in sitting; lateral lean to L side. Max A to set-up for oral care due to impaired vision. Oral care performed at EOB this session due to drowsiness and decreased static/dynamic sitting balance. Min A/additional time for functional transfers from EOB<-->BSC for toileting task with max vc's for positioning due to decreased awareness of the environment. CGA for bladder hygiene while sitting, max/total A for bowel hygiene while standing with straight cane. Max vc's for hand placement. SBA to return supine, needs within reach, bed alarm on. Pt c/o dizziness at end of session; /82, HR 95, 95% O2 RA. EDUCATION: Pt educated on activity pacing, safety with transfers, and techniques to increase awareness of environment; needs reinforcement. Progression toward goals:  [X]          Improving appropriately and progressing toward goals  [ ]          Improving slowly and progressing toward goals  [ ]          Not making progress toward goals and plan of care will be adjusted       PLAN:  Patient continues to benefit from skilled intervention to address the above impairments. Continue treatment per established plan of care. Discharge Recommendations:  Quentin Diane  Further Equipment Recommendations for Discharge:  bedside commode; shower chair       SUBJECTIVE:   Patient stated Mm hm. \"      OBJECTIVE DATA SUMMARY:      G CODES:  Self Care  Current  CK= 40-59%  [id number] Goal  CI= 1-19%. The severity rating is based on the Other Functional Assessment, MMT, ROM     Cognitive/Behavioral Status:  Neurologic State: Alert  Orientation Level: Oriented to person, Disoriented to place, Disoriented to situation, Disoriented to time  Cognition: Decreased attention/concentration, Follows commands  Safety/Judgement: Decreased awareness of environment      Functional Mobility and Transfers for ADLs:              Bed Mobility:   Supine to Sit: Minimum assistance; Additional time  Sit to Supine: Stand-by asssistance; Additional time  Scooting: Moderate assistance                  Transfers:  Sit to Stand: Contact guard assistance;Minimum assistance                           Toilet Transfer : Minimum assistance; Additional time     Balance:  Sitting: Impaired  Sitting - Static: Fair (occasional) (mod vc's to maintain midline in sitting)  Sitting - Dynamic: Poor (constant support)  Standing: Impaired  Standing - Static: Fair  Standing - Dynamic : Fair      ADL Intervention:  Grooming  Washing Face: Supervision/set-up  Brushing Teeth: Supervision/set-up; Maximum assistance (max A to apply toothpaste 2/2 impaired vision)  Cues: Verbal cues provided;Physical assistance     Toileting  Bladder Hygiene: Contact guard assistance (while sitting)  Bowel Hygiene: Maximum assistance; Total assistance (dependent) (while standing)  Clothing Management: Contact guard assistance  Cues: Verbal cues provided (physical assistance)  Adaptive Equipment: Other (comment) (bedside commode)      Max A to set-up for oral care (application of toothpaste to toothbrush) secondary to impaired vision. Supervision/set-up for remainder of oral care tasks. Oral care performed sitting at EOB secondary to increased drowsiness and lateral lean to L side with fair/poor, static/dynamic sitting balance. CGA for bladder hygiene while sitting, max/total A for bowel hygiene while standing with straight cane. Max vc''s for hand placement. Cognitive Retraining  Safety/Judgement: Decreased awareness of environment     Therapeutic Activity:  Min A/additional time for functional transfers from EOB <--> BSC for toileting task with max vc's for BUE positioning secondary to decreased awareness of environment (impaired vision). Pain:  Pre Treatment: 0/10  Post Treatment: 0/10  Pain Scale 1: Visual  Pain Intensity 1: 0     Activity Tolerance:    Please refer to the flowsheet for vital signs taken during this treatment.   After treatment:   [ ]  Patient left in no apparent distress sitting up in chair  [X]  Patient left in no apparent distress in bed  [X]  Call bell left within reach  [X]  Nursing notified  [ ]  Caregiver present  [X]  Bed alarm activated     Kai Benavides MS OTR/KEREN  Time Calculation: 33 mins

## 2017-09-07 PROBLEM — N18.30 CKD (CHRONIC KIDNEY DISEASE) STAGE 3, GFR 30-59 ML/MIN (HCC): Status: ACTIVE | Noted: 2017-01-01

## 2017-09-07 PROBLEM — R53.81 DEBILITY: Status: ACTIVE | Noted: 2017-01-01

## 2017-09-07 PROBLEM — R41.82 ALTERED MENTAL STATUS: Status: ACTIVE | Noted: 2017-01-01

## 2017-09-07 NOTE — PROGRESS NOTES
Problem: Communication Impaired (Adult)  Goal: *Acute Goals and Plan of Care (Insert Text)  Formal speech-language evaluation completed this afternoon with son at b/s. Pt lives with son; he is her primary caregiver. Per sons reports, pt with baseline dementia; not oriented to date/time at baseline. Son cooks and cleans; he reports pt is alone at times at home ~4-5 hours, during which she sits outside on the porch and may water plants. R-hand dominant and literate at baseline. Today, pt A&Ox2. Assessed with various sub-tests from WAB (Western Aphasia Battery), MOCA([place] Cognitive Assessment). Pt with severe L neglect and R gaze preference impeding functional completion of assessment. CDT revealed deficits in visual-spatial organization. Pt is able to answer basic y/n? s and wh-?s. Pt is able to follow 2-step commands with objects placed in R visual field and with body parts. L neglect impeding functional reading at basic phrase and sentence levels. Pt is able to formulate and write at sentence level. Linguistic organization impaired with pt able to recall x 3 objects per concrete category. At this time, pt presents with moderate cognitive-linguistic deficit, exacerbated by L neglect. This deficit places pt at risk for unsafe and ineffective completion of daily living activities upon return home. Skilled speech therapy indicated to increase safety awareness skills for safe return home and into the community. Results and POC discussed with son who was agreeable. Therapy initiated. STGs: To be completed in 2-3 weeks  Pt will:  1. Complete visual-spatial tasks related to functional ADLs for safe, social reintegration with 70% acc, mod visual/verbal cues  2. Recall > 7 items per concrete category with mod visual/verbal cues in 4/5 trials  3. Perform basic ADL/IADL problem solving tasks with 70% acc, with mod visual/verbal cues  3.  Recall 3-5 words after delay/distraction increase recall abilities as related to community reintegration with mod visual/verbal cues in 3/5 trials     LTG: To be completed in 4 weeks  Pt will:  1. Complete visual spatial and abstract reasoning tasks for safe completion of ADLs for return home and community reintegration with 80% acc, min A  2. Complete working memory tasks for safe effective completion of IADLs upon return home alone, with 80% acc, min A on with min-mod visual/verbal cues in 3/5 trials   [facility] SLP SPEECH-LANGUAGE EVALUATION        Patient: Lory Booker (80 y.o. female)      Start of Care Date: 9/7/2017    Referred by : Luiz Mendoza MD                        Attending Physician: Luiz Mendoza MD  Primary Diagnosis: HTN          Treatment Diagnosis  Treatment Diagnosis: muscle weakness  Treatment Diagnosis 2: increased need for assist w/ self care     Precautions: Fall (blind L eye, monitor ortho BPs)      Medical History:   Past Medical History:   Diagnosis Date    Advance directive in chart 7/25/2016    Gastrointestinal disorder      GERD (gastroesophageal reflux disease)      GERD (gastroesophageal reflux disease) 11/8/2012    HTN (hypertension) 11/8/2012    Hypercholesterolemia      Hypertension      Pure hypercholesterolemia 11/8/2012     Past Surgical History:   Procedure Laterality Date    HX GYN        HX ORTHOPAEDIC         Treatment Diagnosis:Cognitive-linguistic deficit            Onset Date:  9/3/17     Reason for Referral:  This patient was referred for Speech Language Evaluation secondary to late-effect CVA. Prior Level of Function/Home Situation:  Prior to this hospitalization this patient was living with son.    Home Situation  Home Environment: Private residence  # Steps to Enter: 4  Rails to Enter: Yes  Hand Rails : Left  Wheelchair Ramp: No  One/Two Story Residence: One story  Living Alone: No (w/ son)  Support Systems: Family member(s), Child(prieto), [employer] [facility]  Patient Expects to be Discharged to[de-identified] Unknown  Current DME Used/Available at Home: Cane, straight  Tub or Shower Type: Tub/Shower combination (did not utilize per son and spongebathes)       OBJECTIVE DATA SUMMARY:   Mental Status:  Neurologic State: Alert, Confused  Orientation Level: Disoriented to place, Disoriented to person  Cognition: Poor safety awareness, Impulsive, Memory loss, Impaired decision making, Decreased attention/concentration        Safety/Judgement: Decreased insight into deficits  Pain:   No pain           Vital Signs:  Resting BP:  BP: 108/68 (Simultaneous filing. User may not have seen previous data.)  HR: Pulse (Heart Rate): 87   Respiratory Rate:   Motor Speech:     Language Comprehension and Expression:  Auditory Comprehension  Auditory Impairment: No   Verbal Expression  Primary Mode of Expression: Verbal  Initiation: No impairment  Automatic Speech Task: No impairment  Repetition: No impairment  Naming: No impairment  Conversation: No impairment        TREATMENT PLAN:  Rehab Potential : [facility] may include:  [ ] Receptive-expressive communication tasks  [X] Cognitive-linguistic tasks  [ ] Oral-motor/laryngeal strengthening exercises  [X] Development of cognitive Skills                                    [X] Patient/caregiver education                 Frequency/Duration: Patient will be followed by Speech Language Pathology for 1-2 times a day for 5-6 days per week to address goals. Discharge Recommendations: 24-hour care      COMMUNICATION/EDUCATION:   [X]         Safety awareness techniques; introduction of incoming info on L side. [X]         Patient/family have participated as able in POC/goal setting  [X]         Patient/family agree to work toward stated goals and plan of care. [ ]         Patient understands intent/goals of therapy, but is neutral about participation.   [ ]         Patient is unable to participate in goal setting and plan of care; will contact      Evaluation: 30 mins.  Treatment: 30 mins.      Speech Language Pathologist:  [facility]               9/7/2017      Thank you for this referral.

## 2017-09-07 NOTE — ROUTINE PROCESS
PT staff member was working with patient during her initial evaluation. Patient was assisted to the side of the bed and Per therapy member\" Started leaning and became like a wet noodle\" when instructed to move upper extremities. BP taken 119/78 while dangling side of the bed, assisted x2 back into supine position, /61,nurse raised HOB up to 30 degrees BP systolic 499. Patient complains of dizziness.  Notified MD.

## 2017-09-07 NOTE — H&P
[address]    Name:  Babs Martin  MR#:  [id number]  :  [id number]  Account #:  [de-identified]  Date of Adm:  2017      HISTORY OF PRESENT ILLNESS: The patient is a 77-year-old  female who was at home being taken care of by her 77-year-old son. She noted the onset of left-sided weakness and was brought to the  hospital where she was noted to have an extensive CVA with left  hemiparesis with MRI that showed acute and subacute right parietal  occipital lobe infarct with several old chronic infarcts noted as well. She  had rather significant neurological issues with a left-handed paresis  with leg worse than arm. She was seen by Therapy. I am not sure that  she was seen by Speech Therapy. She was seen by Neurology who  started the patient on aspirin as well as Plavix and the Plavix was for  90 days. She is admitted up here for rehab. PAST MEDICAL HISTORY: Significant for issues of  1. Hypertension. 2. Gastroesophageal reflux disease. 3. Hypercholesterolemia. TRANSFER MEDICATIONS: Include  1. Aspirin 325 mg a day. 2. Lipitor 80 mg.  3. Plavix 75 mg a day. 4. Lovenox 30 mg q.24h.  5. Hydrochlorothiazide 25 mg a day. 6. Sliding scale insulin, although her hemoglobin A1c was actually well  controlled. 7. Prinivil 5 mg a day. REVIEW OF SYSTEMS: Is not obtainable due to the fact that she is  rather sedated and poorly responsive at this time. PHYSICAL EXAMINATION  GENERAL: Revealed a well-developed female. HEENT: Head was normocephalic. Eyes were equal and reactive to  light. The extraocular movements were intact. Discs were flat. Fundi  were benign. Tympanic membranes were intact. NECK: Supple. CHEST: Clear. CARDIAC: Revealed regular rate and rhythm. ABDOMEN: Soft. Bowel sounds are normally active. NEUROLOGIC: Deep tendon reflexes were equal. The right foot was  withdrawn. The left foot had a neutral Babinski.  She had decreased   strength in her left hand. She could not really raise her left foot  against gravity but could with her left arm. SOCIAL HISTORY: Reveals that she lives by herself. She is   and lives with her son. FAMILY HISTORY: Pertinent for cancer in her father. IMPRESSION  1. Cardiovascular accident with left hemiparesis. 2. History of hypertension. PLAN: At this time is for physical therapy. Also seems that she has a  left visual field cut with right gaze preference. I discussed at length with  the family and they are aware of her condition. We still have her as  being a FULL CODE with her legal guardian being her son.         Shraddha Kennedy MD    [facility] / [facility]  D:  09/07/2017   13:02  T:  09/07/2017   13:26  Job #:  [id number]

## 2017-09-07 NOTE — PROGRESS NOTES
visited with the family of Korin Bowling, who is a 80 y.o.,female. The  provided the following Interventions:  Initiated a relationship of care and support. Offered prayer and assurance of continued prayers on patients behalf. Plan:  Chaplains will continue to follow and will provide pastoral care on an as needed/requested basis.  recommends bedside caregivers page  on duty if patient shows signs of acute spiritual or emotional distress. Patient is unable to communicate at this time.  offered prayer and left Spiritual Care brochure. Chaplains will continue to follow and will provide pastoral care on an as needed/requested basis.     [address] [facility]   Staff [address] [facility]   [phone]

## 2017-09-07 NOTE — ROUTINE PROCESS
[phone] Patient was received via wheelchair accompanied by son &  Nurse. Alert & orient to self with some confusion. Incontinent of bowel & bladder. Some excoriation noted to groin. Bruises to upper extremities. Tolerates medication without difficulty. Bedside and Verbal shift change report given to Lizett Eldridge RN (oncoming nurse) by Kamlesh Renteria RN (offgoing nurse). Report included the following information SBAR, Kardex and MAR. Hourly rounds made.

## 2017-09-07 NOTE — PROGRESS NOTES
Pt was lying in bed upon approach. Pt alert and oriented. Pt pleasant and cooperative during initial activity interview. Pt son was present. Pt's son was able to ID jigsaw puzzles, music, sitting outside, pets, and reading the newspaper as leisure interests. Pt was nodding her head in agreement with the son. Pt and pt's son educated on leisure cabinet and unit activities. Pt agreeable to pet therapy and music. RT will transport pt to activities of interest in the dining room.

## 2017-09-07 NOTE — ROUTINE PROCESS
Post Fall Documentation      Zabrina Arias witnessed/unwitnessed fall occurred on 09/07/2017 (Date) at [phone] (Time). The answers to the following questions summarize the fall:     · In the patient's own words,:  · What was he/she doing when he/she fell? Lying on the floor @ bedside    · What are his/her complaints? No c/o pain    · Nurse:  · Document observation, treatment, conversation, follow-up, and patient response. Patient is confused but oriented to person. No apparent injury noted    · What was the patient's condition when found (i.e., pain, symptoms, cuts, bruises)? No c/o pain,no cuts, has old bruises on ue    · What specific complaints did the patient have? Stated she fell  while she was trying to turn over to her left side    · What did the staff do when patient was found (i.e., vital signs, returned to bed with fall alarm, side rails up)? Asssited back to bed with bed alarm and 3 siderails up 2 upper and 1 lower. V/S ykhbw=898/65,hr=86,rr=20 temp=97.6 02 sat 93% RA. · Which physician was notified?  Yes,.notes left on his clipboard    Erin Brown RN

## 2017-09-07 NOTE — PROGRESS NOTES
Problem: Self Care Deficits Care Plan (Adult)  Goal: *Acute Goals and Plan of Care (Insert Text)  OCCUPATIONAL THERAPY SHORT TERM GOALS   Initiated 9/7/2017 and to be accomplished within 2 Week(s)    1. Patient will perform Upper body ADLs utilizing hemitechnique with moderate assistance . 2. Patient will perform Lower body ADLs utilizing hemitechnique & adaptive equipment with maximal assistance . 3. Patient will perform toileting task with maximal assistance x 2 person assist with Fair safety to reduce falls risk. 4. Patient will perform functional transfers with Rolling Walker and moderate assistance x 2 person assist.  5. Patient will perform standing static/dynamic balance activities for improved ADL/IADL function with maximal assistance x 2 person assist and Fair balance and safety awarenes. 6. Patient will improve Barthel index scores to a tleast 20/100 to improve functional mobility. 7. Patient will perform self feeding tasks with Mod A utilizining adaptive equipment and compensatory techniques. 8. Patient will increase left sided awareness with min verbal and tactile cues for ADL performance skills and functional transfers. OCCUPATIONAL THERAPY LONG TERM GOALS   Initiated 9/7/2017 and to be accomplished within 4 Week(s)    1. Patient will perform Upper body ADLs with/without adaptive equipment with supervision/set-up. 2. Patient will perform Lower body ADLs with/without adaptive equipment with supervision/set-up . 3. Patient will perform toileting task with supervision/set-up with Good safety to reduce falls risk. 4. Patient will perform functional transfers with Rolling Walker and supervision/set-up and Good balance and safety awareness. 5. Patient will perform standing static/dynamic activity for improved ADL/IADL function with supervision/set-up an and Good balance and safety awareness.   6. Patient will improve Barthel index score to 50/100 to improve independence with mobility. Therapist: Zack Newman 9/7/2017   [facility]   OCCUPATIONAL THERAPY EVALUATION        Patient: Sobeida Marquez (71 y.o. female)            Start of Care Date: 9/7/2017                         Onset Date:  9/3/2017  Referred by : James Hall MD                        Primary Diagnosis: HTN      Treatment Diagnosis  Treatment Diagnosis: muscle weakness  Treatment Diagnosis 2: increased need for assist w/ self care     Precautions: Fall (blind L eye, monitor ortho BPs), L sided hemiplegia and L sided neglect  Eval Complexity: History: HIGH Complexity : Extensive review of history including physical, cognitive and psychosocial history . History of present problem reveals HIGH Complexity :3+ comorbidities / personal factors will impact the outcome/ POC . Past Medical history includes:   Past Medical History:   Diagnosis Date    Advance directive in chart 7/25/2016    Gastrointestinal disorder      GERD (gastroesophageal reflux disease)      GERD (gastroesophageal reflux disease) 11/8/2012    HTN (hypertension) 11/8/2012    Hypercholesterolemia      Hypertension      Pure hypercholesterolemia 11/8/2012     Past Surgical History:   Procedure Laterality Date    HX GYN        HX ORTHOPAEDIC          Cognitive Status:  Mental Status  Neurologic State: Alert;Confused  Orientation Level: Disoriented to place; Disoriented to person  Cognition: Poor safety awareness; Impulsive;Memory loss; Impaired decision making;Decreased attention/concentration  Safety/Judgement: Decreased insight into deficits  Barriers to Learning/Limitations: yes;  cognitive, sensory deficits-vision/hearing/speech and altered mental status (i.e.Sedation, Confusion)  Compensate with: visual, verbal, tactile, kinesthetic cues/model  Justification for Evaluation complexity:   HIGH Complexity : Patient presents with comorbidities that affect occupational performance.  Signifigant modification of tasks or assistance (eg, physical or verbal) with assessment (s) is necessary to enable patient to complete evaluation . Patient is referred to [address] [address] [place],[email or web address] [address] due to a functional decline in strength, balance, functional activity tolerance, dexterity, ROM, coordination, visual perceptual skills, safety awareness, which is inhibiting independence w/ self care performance skills and functional mobility. Prior Level of Function/Home Situation:   Home Situation  Home Environment: Private residence  # Steps to Enter: 4  Rails to Enter: Yes  Hand Rails : Left  Wheelchair Ramp: No  One/Two Story Residence: One story  Living Alone: No (w/ son)  Support Systems: Family member(s), Child(prieto), [employer] [facility]  Patient Expects to be Discharged to[de-identified] Unknown  Current DME Used/Available at Home: Cane, straight  Tub or Shower Type: Tub/Shower combination (did not utilize per son and spongebathes)  Level of Assistance received at Home: Prior to this current hospitalization, the patient was Mod I w/ BADLs (spongebathing, dressing and grooming), dep w/ IADLs (son assists w/ med mgmt, meal prep and homemaking tasks), Mod I functional mobility (furniture walking in home and SPC in community. OBJECTIVE DATA SUMMARY:      Vital Signs:  Resting BP:  BP: 108/68 (Simultaneous filing. User may not have seen previous data.)  HR: Pulse (Heart Rate): 87     Pain:     Auditory:  Auditory  Auditory Impairment: None  Examination:   HIGH Complexity : 5 or more performance deficits relating to physical, cognitive , or psychosocial skils that result in activity limitations and / or participation restrictions; Tone and Sensation:                                 Visual Perceptual:  Vision  Tracking: Requires cues, head turns, or add eye shifts to track; Unable to track left to  midline  Visual Fields:  (L sided neglect )    Coordination:  Coordination  Fine Motor Skills-Upper: Right Intact; Left Impaired  Gross Motor Skills-Upper: Right Intact; Left Impaired  Coordination: Generally decreased, functional  Fine Motor Skills-Upper: Right Intact; Left Impaired    Gross Motor Skills-Upper: Right Intact; Left Impaired  Bed Mobility:  Bed Mobility  Rolling: Contact guard assistance;Minimum assistance  Supine to Sit: Moderate assistance;Assist x1;Additional time  Sit to Supine: Maximum assistance;Assist x1;Additional time  Rolling: Contact guard assistance;Minimum assistance  Transfers:  Functional Transfers  Sit to Stand:  (witheld)  Balance:  Balance  Sitting: Impaired  Sitting - Static: Poor (constant support)  Sitting - Dynamic: Poor (constant support)  Gross Assesment:  Gross Assessment  AROM: Generally decreased, functional  PROM: Within functional limits  Strength: Generally decreased, functional  Coordination: Generally decreased, functional  ADL self care:     ADL Intervention:  Upper Body Bathing  Bathing Assistance: Maximum assistance  Upper Body Dressing Assistance  Dressing Assistance: Maximum assistance  Lower Body Bathing  Bathing Assistance: Maximum assistance  Toileting  Toileting Assistance: Total assistance(dependent)  Grooming  Grooming Assistance: Maximum assistance            ASSESSMENT:   A clinical decision making of HIGH  complexity was conducted, which includes an analysis of the Occupational profile, standardized assessments, data and treatment options.                                             THE BARTHEL INDEX      ACTIVITY    SCORE   FEEDING  0=unable  5=needs help cutting,spreading butter,etc., or modified diet  10= independent    0   BATHING  0=dependent  5=independent (or in shower 0   GROOMING  0=needs help  5=independent face/hair/teeth/shaving (implements provided) 0   DRESSING  0=dependent  5=needs help but can do about half unaided  10=independent(including buttons, zips,laces etc.) 0   BOWELS  0=incontinent  5=occasional accident  10=continent 0   BLADDER  0=incontinent, or catheterized and unable to manage alone  5=occasional accident  10=continent 0   TOILET USE  0=dependent  5=needs some help, but can do something alone  10=independent (on and off, dressing, wiping)    0   Transfers 0   MOBILITY (ON LEVEL SURFACES)  0=immobile or <50 yards  5=wheelchair independent,including corners,>50 yards  10=walkes with help of one person (verbal or physical) >50 yards  15=independent(but may use any aid; for example, stick) >50 yards 0   STAIRS  0=unable  5=needs help (verbal, physical, carrying aid)  10=independent 0              TOTAL:             0/100      Additional comments:  Patient presents lying supine in bed, severe L sided neglect noted, patient able to track w/ max verbal cues. Vitals: Supine 114/68 BP, HR 79, O2 93% RA, sitting edge of bed 136/66, HR 53 - nursing notified. Mod A supine to sit edge of bed, poor static sitting balance w/ heavy left lateral lean, Max A sit to supine, patient performed bridge technique to scoot up towards head of bed w/ Min A & verbal/tactile cues provided. L UE AROM and strength impaired. TREATMENT PLAN : Rehab Potential : Good  Skilled Occupational Therapy Services may include:   [X] Self Care/Home Mgmt                    [X]Cognitive Perceptual Re-training                              [X] Adaptive Equip Training                 [X]Therapeutic Exercise                  [ ]Sensory Integration                           [ ]Community Work Integration  [X]Therapeutic Activities                     [X]Other/modalities  [X]Neuromuscular Re-education         [X] Wheelchair Mgmt/propulsion    [X]Patient/Family/Staff Instruction      :  [X]Orthotics/Prosthetic Fitting & training      Frequency/Duration: Patient will be followed by Occupational therapy for 1-2 times a day for a minimum of 3 days per week for 4 weeks to address goals. Discharge Recommendations:  To Be Determined  Patient expected Discharge Location: [X]Private Residence  [ ] SAL  [X]LTC  [ ] [address] Apt COMMUNICATION/EDUCATION:  Patient/Family Agreement with Treatment Plan :      [X]       OT Evaluation/POC has been reviewed with the ALEXANDER. [X]        Fall prevention education was provided and the patient/caregiver indicated understanding  [X]        Patient/family have participated as able in goal setting and plan of care. Patient/family agree to work toward stated goals and plan of care. [ ]        Patient is unable to participate in goal setting and plan of care. Therapist will contact SW/POA. Treatment session:   Overall Complexity:HIGH  Evaluation:  60 mins. Treatment :   30 mins.      Occupational Therapist:   [facility]          9/7/2017   Thank You for this referral.

## 2017-09-07 NOTE — PROGRESS NOTES
Nutrition initial assessment-[facility]/  Plan of care      RECOMMENDATIONS:     1. Dental Soft diet  2. Ensure BID  3. Monitor weight, labs and PO intake  4. RD to follow     GOALS:     1. PO intake meets >75% of protein/calorie needs by 9/14  2. Weight Maintenance (+/- 1-2 lb by 9/14)     ASSESSMENT:     Weight status is classified as overweight per BMI of 26.9. However, weight appropriate for age. PO intake is poor. Added Ensure BID for additional calories/protein. Labs noted. Nutrition recommendations listed. RD to follow. Nutrition Diagnoses:   Inadequate oral intake related to poor appetite as evidenced by less than 10% intake of lunch meal.    Nutrition Risk:  [] High  [x] Moderate []  Low    SUBJECTIVE/OBJECTIVE:      Transferred from [address] [place] [address] to [facility] on 9/6/17. Admitted with CVA. Patient with confusion and unable to provide history. Per nursing, patient with minimal intake of meals and nausea/vomiting this morning. Reviewed SLP recommendations for dental soft diet and thin liquids. Observed less than 10% intake of lunch meal during visit. Encouraged adequate intake. Will monitor.     Information Obtained from:    [x] Chart Review   [x] Patient   [] Family/Caregiver   [x] Nurse/Physician   [] Interdisciplinary Meeting/Rounds    Diet: Dental Soft diet  Medications: [x] Reviewed    Allergies: [x] Reviewed   Patient Active Problem List   Diagnosis Code    HTN (hypertension) I10    GERD (gastroesophageal reflux disease) K21.9    Pure hypercholesterolemia E78.00    Dementia F03.90    Advance directive in chart Z78.9    CVA (cerebral vascular accident) ([facility] [address] I63.9     Past Medical History:   Diagnosis Date    Advance directive in chart 7/25/2016    Gastrointestinal disorder     GERD (gastroesophageal reflux disease)     GERD (gastroesophageal reflux disease) 11/8/2012    HTN (hypertension) 11/8/2012    Hypercholesterolemia     Hypertension     Pure hypercholesterolemia 11/8/2012      Labs:    Lab Results   Component Value Date/Time    Sodium 141 09/03/2017 11:20 AM    Potassium 4.0 09/03/2017 11:20 AM    Chloride 107 09/03/2017 11:20 AM    CO2 26 09/03/2017 11:20 AM    Anion gap 8 09/03/2017 11:20 AM    Glucose 120 09/03/2017 11:20 AM    BUN 27 09/03/2017 11:20 AM    Creatinine 1.31 09/03/2017 11:20 AM    Calcium 8.4 09/03/2017 11:20 AM    Albumin 3.0 09/03/2017 11:20 AM     Anthropometrics: BMI (calculated): 26.9  Last 3 Recorded Weights in this Encounter    09/06/17 1630   Weight: 71.2 kg (157 lb)      Ht Readings from Last 1 Encounters:   09/06/17 5' 4\" (1.626 m)     No data found. IBW: 120 lb %IBW: 131%    Estimated Nutrition Needs: [x] MSJ  [] Other:  Calories: 1817-2337 Kcal Based on:   [x] Actual BW    Protein:   70-85 g Based on:   [x] Actual BW    Fluid:       5830-0081 ml Based on:   [x] Actual BW      [x] No Cultural, Adventist or ethnic dietary need identified.     [] Cultural, Adventist and ethnic food preferences identified and addressed     Wt Status:  [] Normal (18.6 - 24.9) [] Underweight (<18.5) [x] Overweight (25 - 29.9) [] Mild Obesity (30 - 34.9)  [] Moderate Obesity (35 - 39.9) [] Morbid Obesity (40+)     Nutrition Problems Identified:   [x] Suboptimal PO intake   [] Food Allergies  [x] Difficulty chewing/swallowing/poor dentition  [] Constipation/Diarrhea   [x] Nausea/Vomiting   [] None  [] Other:     Plan:   [] Therapeutic Diet  []  Obtained/adjusted food preferences/tolerances and/or snacks options   [x]  Supplements added   [x] Occupational therapy following for feeding techniques  []  HS snack added   [x]  Modify diet texture   []  Modify diet for food allergies   []  Assist with menu selection   [x]  Monitor PO intake on meal rounds   [x]  Continue inpatient monitoring and intervention   [x]  Participate in discharge planning/Interdisciplinary rounds/Team meetings   []  Other:     Education Needs:   [x] Not appropriate for teaching at this time    [] Identified and addressed Nutrition Monitoring and Evaluation:  [x] Continue ongoing monitoring and intervention  [] Other    Shemar yMrick

## 2017-09-07 NOTE — PROGRESS NOTES
[facility]– [place]: [phone] ([phone])  [facility]: [phone]   [facility]/[address]: [phone]    Patient Name: Edward Escobar  YOB: 1925    Date of Initial Consult: 9-7-17  Reason for Consult: Care Decisions  Requesting Provider: Winton Severin, MD   Primary Care Physician: Rita Fernandez., DO      SUMMARY:   Edward Escobar is a 80y.o. year old with a past history of HTN, HLD, GERD, Dementia, who was admitted on 9/6/2017 from ER/Home with a diagnosis of facial droop/AMS. Current medical issues leading to Palliative Medicine involvement include: patient found to have a CVA, asked to support patient/family to establish goals of care. PALLIATIVE DIAGNOSES:   1. AMS  2. CVA  3. Dementia  4. Debility  5. CKD stage 3       PLAN:   1. I met with patient and her son at bedside on the TCC, she is alert, oriented to self, son, Colton finneyless, where she is, that she is here for a stroke, but was incorrect about month, date. She and son shared that she was  more than 25 yrs ago, she had 2 sons, 1 dtr ([facility]), only her son Mike Herzog lives here, the other son lives in [place]) she has 3 grandchildren, none are local. 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 She did grow up here and had 2 brothers. 2. AMS-son said she was confused on admission, he thinks she is getting back to her baseline. Fortunately she has not been agitated or demonstrating any other behaviors. 3. CVA-patient aware she suffered a CVA, son understands that she could have future events in the future. 4. Dementia-patient has needed most of her help with  IADLS-was not having any behaviors at home that were cause for alarm, no danger issues. Son has been her sole caregiver for 7 yrs, we had lengthy discussion about the burdens of caregiving. 5. Debility-she had been continent of urine and stool, prior to the CVA, had walked with a cane only when outside the home.  Fed self, had no swallowing issues, in fact loved to eat out lunch. Stopped showers, only did sink bath for 13 yrs and did dress self. She liked to care for her 2 cats, did like to be outside, watering the plants, would play cards and watch TV. 6. CKD Stage 3-no issue with dysuria nor fluid overload, would only avoid nephrotoxic drugs/dye and maintain hydration. 7. Goals of Care and Code Status-patient had done an AMD in past-named her son Marybeth Alonso. He shared that she had not wanted heroics at EOL, that although she did not like to discuss it, she wanted a natural passing. He completed a POST-electing DNR/DNI, Limited Interventions, ie to return to hospital for future care and tests but not to have a feeding tube in the event she suffered another CVA or the dementia progressed. DNR/DNI entered into EMR, copies of POST to chart and to son. 8. Initial consult note routed to primary continuity provider  9.  Communicated plan of care with: Palliative IDT, RN, PCP       GOALS OF CARE:     [====Goals of Care====]  GOALS OF CARE:  Patient / health care proxy stated goals: to improve and consider return home, vs intermediate/NF      TREATMENT PREFERENCES:   Code Status: DNR    Advance Care Planning:  Advance Care Planning 9/6/2017   Patient's Healthcare Decision Maker is: Legal Next of Tocagen 69   Primary Decision Maker Name Marybeth Alonso   Primary Decision Maker Phone Number [phone]   Primary Decision Maker Relationship to Patient -   Confirm Advance Directive None   Patient Would Like to Complete Advance Directive No   Does the patient have other document types (No Data)       Other:    The palliative care team has discussed with patient / health care proxy about goals of care / treatment preferences for patient.  [====Goals of Care====]    Advance Care Planning 9/6/2017   Patient's Healthcare Decision Maker is: Legal Next of Tocagen Benito   Primary Decision Maker Name Marybeth Alonso   Primary Decision Maker Phone Number [phone]   Primary Decision Maker Relationship to Patient -   Confirm Advance Directive None   Patient Would Like to Complete Advance Directive No   Does the patient have other document types (No Data)      Cliff Luong-Phone number: [phone]     HISTORY:     History obtained from: Chart    CHIEF COMPLAINT: AMS/Facial Droop    HPI/SUBJECTIVE:  97.7, 87, 108/68, 17 on RA qt 95%, Wt 157lb  MAR-ASA, Lipitor, Plavix, Lovenox, HCTZ, Lisinopril  LABS-WBC 7.2, H&H 11.6 & 34.6, Plat 242, INR 1.2, HgbA1c 6, Albumin 3, Bun/Creat 27/1.31, TSH 2.23,   ECHO-Left ventricle: Systolic function was normal. Ejection fraction was estimated   in the range of 55 % to 60 %. There were  no regional wall motion abnormalities. The study was not technically   sufficient to allow evaluation of LV diastolic  function. Right ventricle: Systolic function was normal.    Atrial septum: Techinically difficult bubble study. There was no obvious   evidence of intracardiac shunt by  peripherally-administered agitated saline contrast.    Aortic valve: There was no stenosis. Aorta, systemic arteries: There was mild dilatation of the ascending aorta. The ascending aortic AP dimension was 40  mm. CT head-Acute/subacute right parietal occipital lobe infarct     2. Chronic infarcts involving the right parietal occipital lobe and bilateral  basal ganglia  MRA Brain-. Occlusion proximal right PCA correlating with right PCA territory infarct. Mild adjacent mass effect is seen.     2. Additional multifocal areas of anterior and posterior circulation stenosis  including moderate to severe proximal left PCA, severe left SHANE, and distal  right M1 segment.     3. No intracranial aneurysm is seen  9-5 Bladder scan pre-void: 189ml, post void 98ml  SLP-Dysphagia Dental soft, thin liquids  Neuro-Right PCA Territory Stroke  Already has right parieto-occipital lobe encephalomalacia. New stroke in Right PCA territory.   Given already significant atrophy of brain and encephalomalacia I do not think  Cerebral edema will pose a problem. Was on on aspirin before. Will attempt MRA brain w/o contrast to look for intracranial stenosis. however the entire right PCA is looks completely infarcted so likely will not significantly change. Will get carotid U/S, and echo-unrevealing. Aspirin 325 mg daily Lipitor 80 mg. - take  mg for 90 days along with 75 mg Plavix and after 90 days stop Plavix and continue with ASA  BP already well controlled. Continue BP control.     The patient is:   [] Verbal and participatory  [x] Non-participatory due to:  Patient is quite tired, able to share some history but was dozing off during my visit       Clinical Pain Assessment (nonverbal scale for nonverbal patients): Pain: 0denies         FUNCTIONAL ASSESSMENT:     Palliative Performance Scale (PPS):50%          PSYCHOSOCIAL/SPIRITUAL SCREENING:     Advance Care Planning:  Advance Care Planning 9/6/2017   Patient's Healthcare Decision Maker is: Legal Next of Richard 69   Primary Decision Maker Name J Luis Tam   Primary Decision Maker Phone Number [phone]   Primary Decision Maker Relationship to Patient -   Confirm Advance Directive None   Patient Would Like to Complete Advance Directive No   Does the patient have other document types (No Data)        Any spiritual / Church concerns:  [] Yes /  [x] No    Caregiver Burnout:  [] Yes /  [x] No /  [] No Caregiver Present      Anticipatory grief assessment:   [x] Normal  / [] Maladaptive       ESAS Anxiety: Anxiety: 0    ESAS Depression: Depression: 0        REVIEW OF SYSTEMS:     Positive and pertinent negative findings in ROS are noted above in HPI. Son provided much of the history, no pain, was continent of B&B, now incontinent and had loose stool today. Eating and having no issue. The following systems were [] reviewed / [x] unable to be reviewed as noted in HPI  Other findings are noted below.   Systems: constitutional, ears/nose/mouth/throat, respiratory, gastrointestinal, genitourinary, musculoskeletal, integumentary, neurologic, psychiatric, endocrine. Positive findings noted below. Modified ESAS Completed by: provider   Fatigue: 4 Drowsiness: 4   Depression: 0 Pain: 0   Anxiety: 0 Nausea: 0   Anorexia: 0 Dyspnea: 0     Constipation: No              PHYSICAL EXAM:     Wt Readings from Last 3 Encounters:   09/06/17 71.2 kg (157 lb)   09/03/17 73.5 kg (162 lb)   07/26/17 72.7 kg (160 lb 3.2 oz)     Blood pressure 128/80, pulse 72, temperature 97.9 °F (36.6 °C), resp. rate 17, height 5' 4\" (1.626 m), weight 71.2 kg (157 lb), SpO2 95 %, not currently breastfeeding. Pain:  Pain Scale 1: Visual  Pain Intensity 1: 0                 Last bowel movement: today    Constitutional: was alert and oriented but after a minute she started to close eyes and doze off  Eyes: pupils equal, anicteric  ENMT: no nasal discharge, moist mucous membranes  Respiratory: breathing not labored, symmetric  Gastrointestinal: soft non-tender, +bowel sounds, obese  Musculoskeletal: no deformity, no tenderness to palpation  Skin: warm, dry  Neurologic: following commands, moving all extremities  Psychiatric: full affect, no hallucinations  Other:       HISTORY:     Active Problems:    Debility (9/7/2017)      Altered mental status (9/7/2017)      CKD (chronic kidney disease) stage 3, GFR 30-59 ml/min (9/7/2017)      Past Medical History:   Diagnosis Date    Advance directive in chart 7/25/2016    Gastrointestinal disorder     GERD (gastroesophageal reflux disease)     GERD (gastroesophageal reflux disease) 11/8/2012    HTN (hypertension) 11/8/2012    Hypercholesterolemia     Hypertension     Pure hypercholesterolemia 11/8/2012      Past Surgical History:   Procedure Laterality Date    HX GYN      HX ORTHOPAEDIC        Family History   Problem Relation Age of Onset    Cancer Father      History reviewed, no pertinent family history.   Social History   Substance Use Topics    Smoking status: Never Smoker    Smokeless tobacco: Never Used    Alcohol use No     Allergies   Allergen Reactions    Pcn [Penicillins] Unable to Obtain      Current Facility-Administered Medications   Medication Dose Route Frequency    aspirin (ASPIRIN) tablet 325 mg  325 mg Oral DAILY    atorvastatin (LIPITOR) tablet 80 mg  80 mg Oral QHS    clopidogrel (PLAVIX) tablet 75 mg  75 mg Oral DAILY    hydroCHLOROthiazide (HYDRODIURIL) tablet 25 mg  25 mg Oral DAILY    lisinopril (PRINIVIL, ZESTRIL) tablet 5 mg  5 mg Oral DAILY    enoxaparin (LOVENOX) injection 30 mg  30 mg SubCUTAneous Q24H    insulin lispro (HUMALOG) injection   SubCUTAneous ACB&D    glucose chewable tablet 16 g  4 Tab Oral PRN    glucagon (GLUCAGEN) injection 1 mg  1 mg IntraMUSCular PRN    dextrose (D50W) injection syrg 12.5-25 g  25-50 mL IntraVENous PRN        LAB AND IMAGING FINDINGS:     Lab Results   Component Value Date/Time    WBC 7.2 09/03/2017 11:20 AM    HGB 11.6 09/03/2017 11:20 AM    PLATELET 403 62/14/3227 11:20 AM     Lab Results   Component Value Date/Time    Sodium 141 09/03/2017 11:20 AM    Potassium 4.0 09/03/2017 11:20 AM    Chloride 107 09/03/2017 11:20 AM    CO2 26 09/03/2017 11:20 AM    BUN 27 09/03/2017 11:20 AM    Creatinine 1.31 09/03/2017 11:20 AM    Calcium 8.4 09/03/2017 11:20 AM      Lab Results   Component Value Date/Time    AST (SGOT) 18 09/03/2017 11:20 AM    Alk.  phosphatase 79 09/03/2017 11:20 AM    Protein, total 6.8 09/03/2017 11:20 AM    Albumin 3.0 09/03/2017 11:20 AM    Globulin 3.8 09/03/2017 11:20 AM     Lab Results   Component Value Date/Time    INR 1.2 09/03/2017 11:20 AM    Prothrombin time 14.7 09/03/2017 11:20 AM    aPTT 21.5 06/12/2013 11:00 AM      No results found for: IRON, FE, TIBC, IBCT, PSAT, FERR   No results found for: PH, PCO2, PO2  No components found for: [place]   Lab Results   Component Value Date/Time    CK 89 09/03/2017 11:20 AM    CK - MB <1.0 09/03/2017 11:20 AM              Total time: 100  Counseling / coordination time, spent as noted above: 70  > 50% counseling / coordination?: yes with patient, son, RN   Prolonged service was provided for  [x]30 min   []75 min in face to face time in the presence of the patient, spent as noted above. Time Start: 2:30 pm  Time End: 3:40pm  Note: this can only be billed with  (initial) or 58182 (follow up). If multiple start / stop times, list each separately.

## 2017-09-07 NOTE — ROUTINE PROCESS
Family(son Darshan)notified of fall. Jett Emerson)  made aware. Dr Jasmyn Elizondo made aware-notes left on his clipboard.

## 2017-09-07 NOTE — PROGRESS NOTES
Problem: Mobility Impaired (Adult and Pediatric)  Goal: *Acute Goals and Plan of Care (Insert Text)  PHYSICAL THERAPY STG GOALS :  Initiated 9/7/2017 and to be accomplished within 2 Weeks    1. Patient will move from supine to sit and sit to supine , scoot up and down and roll side to side in bed with minimal assistance/contact guard assist.   2. Patient will transfer from bed to chair and chair to bed with minimal assistance/contact guard assist using RW. 3. Patient will perform sit to stand with minimal assistance/contact guard assist with Fair balance and safety awareness. 4. Patient will ambulate with moderate assistance for 25 feet with RW on level surfaces with 2 turns. PHYSICAL THERAPY LTG GOALS :  Initiated 9/7/2017 and to be accomplished within 6 Weeks    1. Patient will move from supine to sit and sit to supine , scoot up and down and roll side to side in bed with supervision/set-up. 2. Patient will transfer from bed to chair and chair to bed with supervision/set-up using RW. 3. Patient will perform sit to stand with RW supervision/set-up with Good balance and safety awareness. 4. Patient will ambulate with supervision/set-up for 100 feet with RW on level surfaces and be able to maneuver through narrow spaces and obstacles without loss of balance. 5. Patient will ascend/descend TBA. [facility] Physical Therapist: Uma Ríos, ARSENIO on 9/7/2017    [address] [facility]   PHYSICAL THERAPY EVALUATION     Patient: Victorino Holman (80 y.o. female)                Start of Care Date: 9/7/2017   Referred by : Ashutosh Oquendo MD                                      Precautions: Fall (blind L eye; check ortho BP'S)        History:  Patient was admitted to [facility] with a Dx of HTN, CVA   History of present problem reveals HIGH Complexity :3+ comorbidities / personal factors will impact the outcome/ POC .     Past Medical history includes:   Past Medical History:   Diagnosis Date    Advance directive in chart 7/25/2016    Gastrointestinal disorder      GERD (gastroesophageal reflux disease)      GERD (gastroesophageal reflux disease) 11/8/2012    HTN (hypertension) 11/8/2012    Hypercholesterolemia      Hypertension      Pure hypercholesterolemia 11/8/2012     Past Surgical History:   Procedure Laterality Date    HX GYN        HX ORTHOPAEDIC            Cognitive Status:  Mental Status  Neurologic State: Confused;Drowsy; Lethargic  Orientation Level: Disoriented to time;Oriented to person;Oriented to place  Cognition: Decreased attention/concentration; Follows commands; Impulsive;Poor safety awareness  Barriers to Learning/Limitations: yes;  sensory deficits-vision/hearing/speech and altered mental status (i.e.Sedation, Confusion)  Compensate with: visual, verbal, tactile, kinesthetic cues/model  Prior Level of Function/Home Situation and Assitance recieved:  Home Situation  Home Environment: Private residence  # Steps to Enter:  (4 PER CHART\)  One/Two Story Residence: One story  Living Alone: No  Support Systems: Child(prieto)  Patient Expects to be Discharged to[de-identified] Unknown  Current DME Used/Available at Home: Cane, straight  Level of Assistance received at Home:   Pt. Is confused, however states that she was walking without an AD. Medical chart reports that she was using a [employer]. Pt. Is able to verbalize that she lived with her son in a home. Will need to get further clarification. Justification for Evaluation complexity:  Patient is referred to Skilled Physical Therapy services due a functional decline in transfers, bed mobility, gait, balance, safety.   Exam:LOW Complexity : 1-2 Standardized tests and measures addressing body structure, function, activity limitation and / or participation in recreation    Clinical Presentation: MEDIUM Complexity : Evolving with changing characteristics   Eval Complexity: History: MEDIUM  Complexity : 1-2 comorbidities / personal factors will impact the outcome/ POC OBJECTIVE DATA SUMMARY:      Vital Signs:  Resting BP:  BP: 108/68 (Simultaneous filing. User may not have seen previous data.)  HR: Pulse (Heart Rate): 87    Pain:  Pain Screen  Pain Scale 1: Visual  Pain Intensity 1: 0  Patient Stated Pain Goal: 0  Pain Reassessment 1: Patient sleeping  Gross Assessment:  Gross Assessment  AROM: Generally decreased, functional  PROM: Within functional limits  Strength: Generally decreased, functional  Coordination: Generally decreased, functional     Bed Mobility:  Bed Mobility  Rolling: Contact guard assistance;Minimum assistance  Supine to Sit: Moderate assistance; Additional time;Assist x1  Sit to Supine: Maximum assistance; Additional time;Assist x2   Pt. Required cues to attend to her LUE during rolling and bed mobility. Leaning heavily towards the L. Unable to sit at midline. Pt. Became increasingly lethargic once seated at the EOB, pt. States she was dizzy. Pt. Returned to supine and BP checked at 119/62. Pt. Was lethargic and not responding as usual.  Nursing came in to assess pt. And suspects orthostatic hypotension. Balance:  Balance  Sitting: Impaired  Sitting - Static: Poor (constant support) (pt. leans heavily towards L; c/o dizziness)  Sitting - Dynamic: Poor (constant support)  Transfers:  Sit to Stand:  (witheld) pt. C/o dizziness/  Nursing in to assess BP and further evaluate the pt. Pt. Unable to tolerate sitting at EOB. Gait:     Gait Description (WDL):  (NT; pt. unable to tolerate)         Assessment:   Clinical Decision Making:Low Complexity based on functional evaluation using standardized patient assessment. Positioning needs/Additional Comments : Pt. Participated in LE exercises in supine, heel slides/ AA w/ cues cyndy, ankle pumps, quad sets. Pt. S/p fall this am.  Per nursing. Nurse will inform MD re: further assessment of possible orthostatic concerns.         TREATMENT PLAN: Rehab Potential : Good  Skilled Physical Therapy Services may include:   [X]           Bed Mobility Training             [X]    Neuromuscular Re-Education  [X]           Transfer Training                   [ ]    Orthotic/Prosthetic Training  [X]           Gait Training                          [ ]    Modalities  [X]           Therapeutic Procedures        [ ]    Manual Therapy  [X]           Therapeutic Activities            [X]    Patient and Family Training/Education  [ ]           Other (comment):      Frequency/Duration: Patient will be followed by physical therapy for 1-2 times a day for a minimum of 3 days per week for 4 weeks to address goals. Discharge Recommendations: Home Health or LTC depending on progress  Patient's expected Discharge Location:  [X]Private Residence  [ ] SAL/ILF  [ Wanetta Aures  [ ]Other:       COMMUNICATION/EDUCATION:  Patient/Family Agreement with Treatment Plan :      [ ]         The PT evaluation/POC has been reviewed with PT assistant. [X]         Fall prevention education was provided and the patient/caregiver indicated understanding. [ ]         Patient/family have participated as able in goal setting and plan of care and Patient/family agree to work toward stated goals and plan of care. [X]         Patient is unable to participate in goal setting and plan of care. Therapist/SW will contact family/POA. Treatment session:  Overall Complexity: LOW  Evaluation:  20 mins./ Treatment:  12 mins.   Physical Therapist:   Toñito Kelley, PT       9/7/2017   Thank you for this referral.

## 2017-09-08 NOTE — PROGRESS NOTES
Bedside shift change report given to ZACHERY Escobedo LPN (oncoming nurse) by BRUCE Quezada, RN (offgoing nurse). Report included the following information SBAR, Kardex and MAR.

## 2017-09-08 NOTE — PROGRESS NOTES
Problem: Communication Impaired (Adult)  Goal: *Acute Goals and Plan of Care (Insert Text)  STGs: To be completed in 2-3 weeks  Pt will:  1. Complete visual-spatial tasks related to functional ADLs for safe, social reintegration with 70% acc, mod visual/verbal cues  2. Recall > 7 items per concrete category with mod visual/verbal cues in 4/5 trials  3. Perform basic ADL/IADL problem solving tasks with 70% acc, with mod visual/verbal cues  3. Recall 3-5 words after delay/distraction increase recall abilities as related to community reintegration with mod visual/verbal cues in 3/5 trials     LTG: To be completed in 4 weeks  Pt will:  1. Complete visual spatial and abstract reasoning tasks for safe completion of ADLs for return home and community reintegration with 80% acc, min A  2. Complete working memory tasks for safe effective completion of IADLs upon return home alone, with 80% acc, min A on with min-mod visual/verbal cues in 3/5 trials          Outcome: [facility] SPEECH-LANGUAGE    DAILY TREATMENT NOTE     Patient:  Lisa Koehler (80 y.o. female)                                Date: 9/8/2017       Primary Diagnosis: HTN   Treatment Diagnosis  Treatment Diagnosis: cognitive linguistic deficits  Treatment Diagnosis 2: increased need for assist w/ self care  Physician: Robbie Brown MD  Precautions: Fall (blind L eye, monitor ortho BPs)  Mental Status:  Mental Status  Neurologic State: Alert, Appropriate for age  Orientation Level: Disoriented to place, Disoriented to situation, Disoriented to time  Cognition: Poor safety awareness  Safety/Judgement: Decreased insight into deficits      OBJECTIVE DATA SUMMARY:   Treatment & Interventions:  Neuro-Linguistics:        Verbal Problem Solving: Impaired     Thought Organization: 30%     Memory: Impaired           Memory Exercises  Digit/Word Span Length: 3  Digit/Word Span Accuracy (%): 100 %  Recall Message Time Delay: 1 (30%)     Verbal Problem Solving Exercises  Solution Accuracy (%): 50 %         ASSESSMENT:  Patient seen for speech and language therapy this day. Able to name 3 items from concrete category Lizzy; 6-8 modA. Patient completed hypothetical problem solving task with 50% Lizzy, 60% Aki; 80% modA. Patient benefit from prompting and semantic cues. Patient completed list of 3 recall with immediate recall at 100%; 30 second delay at 100%; 1 minute delay at 0% Lizzy; 30% with category cues. Progression toward goals:  [ ]         Improving appropriately and progressing toward goals  [X]         Improving slowly and progressing toward goals  [ ]         Not making progress toward goals and plan of care will be adjusted       PLAN:  Recommendations and Planned Interventions:  Continue current POC  Patient continues to benefit from skilled intervention to address the above impairments. Continue treatment per established plan of care.   Discharge Recommendations:  Mario Glez 50:     [X]        Safety precautions  [ ]        Compensatory communication techniques  [X]        Internal/external memory techniques        Treatment Time:  39  Minutes     Therapist:    Prince Chatman        9/8/2017

## 2017-09-08 NOTE — PROGRESS NOTES
Attempted pet therapy. Pt was observed to be sleeping during pet therapy visit. Will attempt to see pt next visit.

## 2017-09-08 NOTE — ROUTINE PROCESS
MDS SECTION GG NURSING REPORT      Patient:  Herman Mccollum (4 y.o. female)                         Date: 9/8/2017    Primary Diagnosis: HTN      Attending Physician: Tami Hicks MD   Treatment Diagnosis  Treatment Diagnosis: cognitive linguistic deficits  Treatment Diagnosis 2: increased need for assist w/ self care    Use the KEY on right side to code Functional Ability           MDS Section GG Data Collection Tool  Key:      01     Dependent      02     Substantial/Maximal               Assistance             ([place] does > 50%)      03     Partial/Moderate             Assistance             ([place] does < 50%)      04     Supervision or             Touching Assistance      05     Set-up or [address]      06     Independent      07     Resident Refused      09      not applicable      88     Not Attempted d/t             Medical or Safety      7-3 3-11 11-7      Performance Code Performance Code Performance Code    Self Care Eating 3       Oral Hygiene 2       Toilet Hygiene 1       Sit to Lying 2       Lying to sitting on SOB 2       Sit to stand 2       Chair/Chair -to- bed Transfer 8       Toilet Transfer 8      Mobility Walk 50 ft. ,   2 turns 8       Walk  150 ft. 8       Wheel 50 ft. ,   2 turns 8       Wheel 150 ft. 9

## 2017-09-08 NOTE — PROGRESS NOTES
Problem: Self Care Deficits Care Plan (Adult)  Goal: *Acute Goals and Plan of Care (Insert Text)  OCCUPATIONAL THERAPY SHORT TERM GOALS   Initiated 9/7/2017 and to be accomplished within 2 Week(s)    1. Patient will perform Upper body ADLs utilizing hemitechnique with moderate assistance . 2. Patient will perform Lower body ADLs utilizing hemitechnique & adaptive equipment with maximal assistance . 3. Patient will perform toileting task with maximal assistance x 2 person assist with Fair safety to reduce falls risk. 4. Patient will perform functional transfers with Rolling Walker and moderate assistance x 2 person assist.  5. Patient will perform standing static/dynamic balance activities for improved ADL/IADL function with maximal assistance x 2 person assist and Fair balance and safety awarenes. 6. Patient will improve Barthel index scores to a tleast 20/100 to improve functional mobility. 7. Patient will perform self feeding tasks with Mod A utilizining adaptive equipment and compensatory techniques. 8. Patient will increase left sided awareness with min verbal and tactile cues for ADL performance skills and functional transfers. OCCUPATIONAL THERAPY LONG TERM GOALS   Initiated 9/7/2017 and to be accomplished within 4 Week(s)    1. Patient will perform Upper body ADLs with/without adaptive equipment with supervision/set-up. 2. Patient will perform Lower body ADLs with/without adaptive equipment with supervision/set-up . 3. Patient will perform toileting task with supervision/set-up with Good safety to reduce falls risk. 4. Patient will perform functional transfers with Rolling Walker and supervision/set-up and Good balance and safety awareness. 5. Patient will perform standing static/dynamic activity for improved ADL/IADL function with supervision/set-up an and Good balance and safety awareness.   6. Patient will improve Barthel index score to 50/100 to improve independence with mobility. Therapist: Lucia Mendoza 9/7/2017   [facility]   OCCUPATIONAL THERAPY DAILY TREATMENT NOTE        Patient: Tesha Fonseca (23 y.o. female)                   Date: 9/8/2017  Attending Physician: Thalia Moreno MD  Primary Diagnosis: HTN    Treatment Diagnosis  Treatment Diagnosis: cognitive linguistic deficits  Treatment Diagnosis 2: increased need for assist w/ self care   Precautions : Precautions at Admission: Fall (blind L eye, monitor ortho BPs)  Vital Signs: supine 117/56, HR 82, sitting edge of bed 111/74, HR 86 - nursing notified     Cognitive Status:  Mental Status  Neurologic State: Alert; Appropriate for age  Orientation Level: Disoriented to place; Disoriented to situation;Disoriented to time  Pain: 0/10              Bed Mobility:  Bed Mobility  Rolling: Maximum assistance;Assist x2  Supine to Sit: Maximum assistance;Assist x2  Sit to Supine: Maximum assistance;Assist x2           Balance:  Balance  Sitting - Static: Supported sitting;Poor (constant support)        ADL Intervention:    Co tx w/ PT to maximiize functional potential secondary to patient c/o fatigue and max encouragement to fully participate. WB through affected LUE while sitting edge of bed, verbal and tactile cues for hand placement during bed mobility and supine <-> sit edge of bed. Toileting  Toileting Assistance: Total assistance(dependent) (bed pan)  Bladder Hygiene: Total assistance (dependent)  Bowel Hygiene: Total assistance (dependent)  Clothing Management: Total assistance (dependent)        Feeding  Drink to Mouth: Moderate assistance  Adaptive Equipment:  (2 handle Provalae drinking cup)                    Patient/Caregiver Education:    Pt. on O. T. Plan of care and importance of participation in order to increase participation secondary to c/o fatigue.         ASSESSMENT:  Patient continues to demonstrate the need for skilled Occupational Therapy services to improve   self-feeding, grooming, bathing, upper body dressing, lower body dressing, toileting and toilet transfer  Progression toward goals:  [ ]      Improving appropriately and progressing toward goals  [X]      Improving slowly and progressing toward goals  [ ]      Not making progress toward goals and plan of care will be adjusted      Treatment session:   41 minutes     Therapist:    Mynor Carter,  9/8/2017

## 2017-09-08 NOTE — PROGRESS NOTES
Problem: Mobility Impaired (Adult and Pediatric)  Goal: *Acute Goals and Plan of Care (Insert Text)  PHYSICAL THERAPY STG GOALS :  Initiated 9/7/2017 and to be accomplished within 2 Weeks    1. Patient will move from supine to sit and sit to supine , scoot up and down and roll side to side in bed with minimal assistance/contact guard assist.   2. Patient will transfer from bed to chair and chair to bed with minimal assistance/contact guard assist using RW. 3. Patient will perform sit to stand with minimal assistance/contact guard assist with Fair balance and safety awareness. 4. Patient will ambulate with moderate assistance for 25 feet with RW on level surfaces with 2 turns. PHYSICAL THERAPY LTG GOALS :  Initiated 9/7/2017 and to be accomplished within 6 Weeks    1. Patient will move from supine to sit and sit to supine , scoot up and down and roll side to side in bed with supervision/set-up. 2. Patient will transfer from bed to chair and chair to bed with supervision/set-up using RW. 3. Patient will perform sit to stand with RW supervision/set-up with Good balance and safety awareness. 4. Patient will ambulate with supervision/set-up for 100 feet with RW on level surfaces and be able to maneuver through narrow spaces and obstacles without loss of balance. 5. Patient will ascend/descend TBA. [facility] Physical Therapist: Katherine Brantley PT on 9/7/2017    [facility]   PHYSICAL THERAPY DAILY TREATMENT NOTE        Patient:  Donnell Marroquin (77 y.o. female)               Date: 9/8/2017    Physician: Kira Sandhu MD  Primary Diagnosis: HTN          Treatment Diagnosis  Treatment Diagnosis: cognitive linguistic deficits  Treatment Diagnosis 2: increased need for assist w/ self care  Precautions: Fall (blind L eye, monitor ortho BPs)  Vital Signs  Vital Signs  Pulse (Heart Rate): 85  BP: 111/90     Cognitive Status:   lethargic, confused  Pain     Bed Mobility Training  Bed Mobility Training  Rolling: Maximum assistance; Additional time;Assist x2  Supine to Sit: Maximum assistance; Additional time;Assist x2  Sit to Supine: Total assistance; Additional time;Assist x2  Balance  Sitting: Impaired;High guard; With support  Sitting - Static: Poor (constant support); Supported sitting (leans heavily to L; cues to attend to L side)  Sitting - Dynamic: Poor (constant support)  Standing:  (nt due to pt. drowsy)  L sided neglect. Able to attend to it briefly with verbal and tactile cues. Therapeutic Exercise:   PROM hip/knee and ankle. Pt. Required constant cues to perform exercises today. Co treatment with OT to maximize maximum participation with therapy. Assisted pt. With bed mobility in rolling, bridging in bed while OT assisted pt. With enma-care. Pt. Was able to sit at EOB supported and drink water with maximum assistance to maintain sitting balance at the EOB. Pt. Attempts to support with RUE while sitting, however unable to sit at midline. Pt. Initiating and attempting to assist with bed mobility. Pt. Was drowsy throughout session keeping her eyes closed. Pt. Frequently requesting to lay down throughout PT session stating \"im sleepy\". ASSESSMENT:  Patient continues to benefit from Skilled PT services to improve bed mobility, transfers, sitting balance. Progression toward goals:  [ ]      Improving appropriately and progressing toward goals  [X]      Improving slowly and progressing toward goals  [ ]      Not making progress toward goals and plan of care will be adjusted      Treatment session: 44 minutes.   Therapist:   Tenzin Dexter, PT,          9/8/2017

## 2017-09-08 NOTE — PROGRESS NOTES
Late entry: JANENE met with pt and her son to complete the social evaluation. Pt lives in her own home with her son Adrian Gamez # [phone] who is pt's POA. JANENE requested that pt's son bring in copy of his POA documents for financials. Pt has a Medical POA on in her chart. JANENE informed pt's son of the rehab process since pt seemed confused. Stated that she didn't know where she was nor what happened to her. Pt' son informed JANENE that pt was at [address] [place] [address] in the past for respite care but he's not sure of pt's d/c disposition at this time. JANENE encouraged pt's son to come in for rounds with the MD. He seems supportive and willing to assist with d/c planning. JANENE will follow.

## 2017-09-08 NOTE — PROGRESS NOTES
conducted a follow-up consultation for Lisa Koehler, who is a 80 y.o.,female. Patient was resting in bed, but her eyes were open; she did not speak when I introduced myself to her and her son Josehpine Yang, who was at bedside. Josephine Yang relayed that his mother was complaining that her head hurt. The  provided the following interventions:  Continued the relationship of care and support with patient and her son Josephine Yang. Let patient and her son know that I would locate her nurse. Let patient's nurse of patient's request, and she followed up with patient quickly. Plan:  Chaplains will continue to follow and will provide pastoral care on an as-needed/requested basis.     [facility] Certified [address] [place] [address]    [phone]

## 2017-09-08 NOTE — ROUTINE PROCESS
Bedside and Verbal shift change report given to  Anel Kingston  rn(oncoming nurse) by nely Lala lpn (offgoing nurse). Report included the following information SBAR, Kardex and MAR. hourly rounds

## 2017-09-09 NOTE — PROGRESS NOTES
Pt refused lab draw x 2, both times she pulled the needle out before the lab could get a specimen, unable to redirect, pt cont to donnie and scream

## 2017-09-09 NOTE — ROUTINE PROCESS
Bedside and Verbal [facility] by Peyton Dixon RN (offgoing nurse). Report included the following information SBAR, Kardex and MAR.  24 hour chart check completed, pt visually checked q 1 hour by nursing staff

## 2017-09-09 NOTE — PROGRESS NOTES
Problem: Self Care Deficits Care Plan (Adult)  Goal: *Acute Goals and Plan of Care (Insert Text)  OCCUPATIONAL THERAPY SHORT TERM GOALS   Initiated 9/7/2017 and to be accomplished within 2 Week(s)    1. Patient will perform Upper body ADLs utilizing hemitechnique with moderate assistance . 2. Patient will perform Lower body ADLs utilizing hemitechnique & adaptive equipment with maximal assistance . 3. Patient will perform toileting task with maximal assistance x 2 person assist with Fair safety to reduce falls risk. 4. Patient will perform functional transfers with Rolling Walker and moderate assistance x 2 person assist.  5. Patient will perform standing static/dynamic balance activities for improved ADL/IADL function with maximal assistance x 2 person assist and Fair balance and safety awarenes. 6. Patient will improve Barthel index scores to a tleast 20/100 to improve functional mobility. 7. Patient will perform self feeding tasks with Mod A utilizining adaptive equipment and compensatory techniques. 8. Patient will increase left sided awareness with min verbal and tactile cues for ADL performance skills and functional transfers. OCCUPATIONAL THERAPY LONG TERM GOALS   Initiated 9/7/2017 and to be accomplished within 4 Week(s)    1. Patient will perform Upper body ADLs with/without adaptive equipment with supervision/set-up. 2. Patient will perform Lower body ADLs with/without adaptive equipment with supervision/set-up . 3. Patient will perform toileting task with supervision/set-up with Good safety to reduce falls risk. 4. Patient will perform functional transfers with Rolling Walker and supervision/set-up and Good balance and safety awareness. 5. Patient will perform standing static/dynamic activity for improved ADL/IADL function with supervision/set-up an and Good balance and safety awareness.   6. Patient will improve Barthel index score to 50/100 to improve independence with mobility. Therapist: Raghavendra Hassan 9/7/2017   [facility]   OCCUPATIONAL THERAPY DAILY TREATMENT NOTE        Patient: Daniel Christine (24 y.o. female)                   Date: 9/9/2017  Attending Physician: Ayla Oviedo MD  Primary Diagnosis: HTN    Treatment Diagnosis  Treatment Diagnosis: cognitive linguistic deficits  Treatment Diagnosis 2: increased need for assist w/ self care   Precautions : Precautions at Admission: Fall (blind L eye, monitor ortho BPs)  Vital Signs:        Cognitive Status:  Mental Status  Neurologic State: Alert;Confused  Orientation Level: Oriented to person;Oriented to place  Cognition: Decreased attention/concentration  Pain:  Pain Screen  Pain Scale 1: Numeric (0 - 10)  Pain Intensity 1: 0  Patient Stated Pain Goal: 0  Pain Scale 1: Numeric (0 - 10)  Bed Mobility:  Bed Mobility  Rolling: Maximum assistance;Assist x2  Balance:  Balance  Sitting: Impaired  Sitting - Static: Poor (constant support) (L lateral lean)  Therapeutic Activities:  Pt droswy throughout entire session requiring max vc's to complete tasks. Pt requires constant cueing to attend to L secondary to neglect. Pt required MAX A x 2 for fnxl bed mobility task and repositioning. Pt confused with poor carryover. Therapeutic Exercises:   BUE ex with 1.5# hand gripper x 15 and orange theraband 3 x 10 to increase strength and ROM for ADL carryover. ASSESSMENT:  Patient continues to demonstrate the need for skilled Occupational Therapy services to improve strength, balance, and endurance.    Progression toward goals:  [ ]      Improving appropriately and progressing toward goals  [X]      Improving slowly and progressing toward goals  [ ]      Not making progress toward goals and plan of care will be adjusted      Treatment session:   50 minutes     Therapist:    CATHERINE Borja,  9/9/2017

## 2017-09-09 NOTE — ROUTINE PROCESS
MDS SECTION GG NURSING REPORT      Patient:  Carlos Palafox (5 y.o. female)                         Date: 9/9/2017    Primary Diagnosis: HTN      Attending Physician: Meagan Finley MD   Treatment Diagnosis  Treatment Diagnosis: cognitive linguistic deficits  Treatment Diagnosis 2: increased need for assist w/ self care    Use the KEY on right side to code Functional Ability           MDS Section GG Data Collection Tool  Key:      01     Dependent      02     Substantial/Maximal               Assistance             ([place] does > 50%)      03     Partial/Moderate             Assistance             ([place] does < 50%)      04     Supervision or             Touching Assistance      05     Set-up or [facility] 34     Resident Refused      09      not applicable      88     Not Attempted d/t             Medical or Safety      7-3 3-11 11-7      Performance Code Performance Code Performance Code    Self Care Eating  01 01     Oral Hygiene  01 88     Toilet Hygiene  01 01     Sit to Lying  88 88     Lying to sitting on SOB  88 88     Sit to stand  88 88     Chair/Chair -to- bed Transfer  88 88     Toilet Transfer  88 88    Mobility Walk 50 ft. ,   2 turns  88 88     Walk  150 ft.  88 88     Wheel 50 ft. ,   2 turns  88 88     Wheel 150 ft.  88 88

## 2017-09-10 NOTE — PROGRESS NOTES
Problem: Mobility Impaired (Adult and Pediatric)  Goal: *Acute Goals and Plan of Care (Insert Text)  PHYSICAL THERAPY STG GOALS :  Initiated 9/7/2017 and to be accomplished within 2 Weeks    1. Patient will move from supine to sit and sit to supine , scoot up and down and roll side to side in bed with minimal assistance/contact guard assist.   2. Patient will transfer from bed to chair and chair to bed with minimal assistance/contact guard assist using RW. 3. Patient will perform sit to stand with minimal assistance/contact guard assist with Fair balance and safety awareness. 4. Patient will ambulate with moderate assistance for 25 feet with RW on level surfaces with 2 turns. PHYSICAL THERAPY LTG GOALS :  Initiated 9/7/2017 and to be accomplished within 6 Weeks    1. Patient will move from supine to sit and sit to supine , scoot up and down and roll side to side in bed with supervision/set-up. 2. Patient will transfer from bed to chair and chair to bed with supervision/set-up using RW. 3. Patient will perform sit to stand with RW supervision/set-up with Good balance and safety awareness. 4. Patient will ambulate with supervision/set-up for 100 feet with RW on level surfaces and be able to maneuver through narrow spaces and obstacles without loss of balance. 5. Patient will ascend/descend TBA. [facility] Physical Therapist: Uma Ríos PT on 9/7/2017    [facility]   PHYSICAL THERAPY DAILY TREATMENT NOTE        Patient: Victorino Holman (89 y.o. female)               Date: 9/10/2017    Physician: Ashutosh Oquendo MD  Primary Diagnosis: HTN          Treatment Diagnosis  Treatment Diagnosis: cognitive linguistic deficits  Treatment Diagnosis 2: increased need for assist w/ self care  Precautions: Fall (blind L eye, monitor ortho BPs)  Vital Signs  Cognitive Status:  Mental Status  Neurologic State: Confused; Agitated  Orientation Level: Oriented to person  Cognition: Decreased attention/concentration; Impaired decision making; Impulsive;Memory loss;Poor safety awareness  Pain  Pain Screen  Pain Scale 1: Numeric (0 - 10)  Pain Intensity 1: 0  Patient Stated Pain Goal: 0  Bed Mobility Training  Balance  Transfer Training  Gait Training  Therapeutic Exercise:   Pt is more alert this am, however, requiring occasional tactile stimulation to stay awake throughout session. Pt able to participate in supine LE exercises AAROM B LE's: ankle pumps, hip flexion/extension, knee flexion/extension, SLR, and hip abd/add 2x15 reps with rest breaks in between due to decreased tolerance. BP baseline: 137/56, HR: 74, O2: 96%; BP after exs: 153/43, HR: 80, O2: 95%     Patient/Caregiver Education:   Pt /Caregiver Education on activity pacing, energy conservation, and benefits of exercise to improve overall LE ROM, strength, and endurance in preparation for performing transfers activities. Pt needs reinforcement. ASSESSMENT:  Patient continues to benefit from Skilled PT services to improve strength, endurance, balance, and functional mobility with transfers/gait. Progression toward goals:  [ ]      Improving appropriately and progressing toward goals  [X]      Improving slowly and progressing toward goals  [ ]      Not making progress toward goals and plan of care will be adjusted      Treatment session: 35 minutes.   Therapist:   Kasey Luna PTA,          9/10/2017

## 2017-09-10 NOTE — ROUTINE PROCESS
Bedside and Verbal shift change report given to NELSON MOODY RN (oncoming nurse) by Myrna Hollingsworth RN (offgoing nurse). Report included the following information SBAR, Kardex and MAR. QH VISUAL CHECKS AND 24H CHART CHECKS DONE.

## 2017-09-10 NOTE — ROUTINE PROCESS
Bedside shift change report given to [facility] rn (oncoming nurse) by lavelle rn (offgoing nurse). Report included the following information Kardex, MAR and Recent Results.

## 2017-09-10 NOTE — ROUTINE PROCESS
Bedside and Verbal shift report given to Candida Holstein RN (on coming nurse  by Jerry Wong LPN (off going nurse) Report included SBAR, Kardex, Mars and recent results.

## 2017-09-10 NOTE — ROUTINE PROCESS
Patient's son at bedside reported patient has some difficulty swallowing food, and liquids. Takes minimal liquids due to difficulty swallowing.  Son made aware that we will see if see tolerates thickened liquids, message left for Dr. Cecily Rizvi

## 2017-09-11 NOTE — PROGRESS NOTES
I have reviewed this patient's current medication list and recent laboratory results. At this time, I do not suggest any drug therapy adjustments or additional laboratory monitoring. Thank you,  Holger MACEDO  Ph. M. S.  9/11/2017

## 2017-09-11 NOTE — PROGRESS NOTES
Late entry: Pt's son requested that Sw met with him to clarify information re: rehab. SW informed pt's son again of the rehab process. Pt's son seemed to have difficulty processing information. Pt's son stated that he planned to go to [address] [place] [address] today and speak with Yaneli Montano. SW informed pt's son that [address] [place] [address] isn't the appropriate setting for pt at this time  due to her functional status. Pt's son became defensive and stated that \"you can't tell me what to do\". SW encourage pt's son  to  wait until we have a family meeting to discuss the appropriate setting for pt. He seemed unwilling to listen to SW suggestions.

## 2017-09-11 NOTE — PROGRESS NOTES
Speech Therapy Note:    Pt currently on ST caseload and being followed for cognition; will evaluation for dysphagia next day as indicated. Rosalinda Marion., [address] [facility]  Office: [phone]  Pager: [phone]

## 2017-09-11 NOTE — PROGRESS NOTES
Problem: Communication Impaired (Adult)  Goal: *Acute Goals and Plan of Care (Insert Text)  Formal bedside swallow evaluation completed secondary to RN concerns of difficulty swallowing. Pt alert and oriented to self and place. Able to feed self during evaluation. Oral-motor exam revealed structures grossly intact for mastication and deglutition. Accepted thin liquid trials via cup and straw sips with intermittent throat clears and teary eyes. S/sx aspiration resolved with straw sips of nectar-thick liquids. Labored oral bolus cohesion and propulsion noted with soft cracker; noted cough x 1 with increased mastication. Mildly decreased hyolaryngeal excursion to palpation. No further aspiration s/s noted with soft cookie and nectar-thick liquid wash. Pt presents with moderate oropharyngeal dysphagia, as evidenced above, which places pt at risk for aspiration. At this time, safest for mech-soft solids (chopped meats) with nectar-thick liquids. Skilled speech therapy intervention warranted to facilitate effective swallow of least restrictive diet for meeting nutritional needs, dining in social situations, and QOL. STGs: To be completed in 2-3 weeks  Pt will:  1. Complete visual-spatial tasks related to functional ADLs for safe, social reintegration with 70% acc, mod visual/verbal cues  2. Recall > 7 items per concrete category with mod visual/verbal cues in 4/5 trials  3. Perform basic ADL/IADL problem solving tasks with 70% acc, with mod visual/verbal cues  4. Recall 3-5 words after delay/distraction increase recall abilities as related to community reintegration with mod visual/verbal cues in 3/5 trials   5. (NEW GOAL 9/11/17) Tolerate mech-soft, thin liquid trials without overt s/sx aspiration/distress in 4/5 trials with min visual/verbal/tactile cues  6.  Utilize compensatory swallow maneuvers (decrease bolus size, decrease rate of intake, cyclical ingestion, etc.) to increase swallow function/safety with min visual, verbal and/or tactile cues in 4/5 trials. 7. Perform oral-motor/laryngeal strengthening exercises to increase swallow strength to decrease aspiration risk, min A  8. Complete an objective measure of swallow fxn (i.e., MBSS) to assess oropharyngeal anatomy/physiology for determination of safest least-restrictive diet and/or appropriate rehabilitation techniques. LTG: To be completed in 4 weeks  Pt will:  1. Complete visual spatial and abstract reasoning tasks for safe completion of ADLs for return home and community reintegration with 80% acc, min A  2. Complete working memory tasks for safe effective completion of IADLs upon return home alone, with 80% acc, min A on with min-mod visual/verbal cues in 3/5 trials   3. Patient will tolerate mech-soft/thin liquid diet with 0 clinical s/sx aspiration, min A, for meeting nutritional needs and quality of life            [facility] [address] EVALUATION        Patient: Victorino Holman (80 y.o. female)          Start of Care Date: 9/11/2017       Referred by :  Ashutosh Oquendo MD                            Attending Physician: Ashutosh Oquendo MD  Primary Diagnosis: HTN       Treatment Diagnosis  Treatment Diagnosis: oropharyngeal dysphagia   Treatment Diagnosis 2: increased need for assist w/ self care      Precautions: Fall (blind L eye, monitor ortho BPs) aspiration  Medical History:   Past Medical History:   Diagnosis Date    Advance directive in chart 7/25/2016    Gastrointestinal disorder      GERD (gastroesophageal reflux disease)      GERD (gastroesophageal reflux disease) 11/8/2012    HTN (hypertension) 11/8/2012    Hypercholesterolemia      Hypertension      Pure hypercholesterolemia 11/8/2012     Past Surgical History:   Procedure Laterality Date   Paul MENESES        [facility] ORTHOPAEDIC          Treatment Diagnosis: oropharyngeal dysphagia                     Onset Date:  9/13/17                Reason for referral:  This patient has been referred for a speech therapy evaluation secondary to concerns re: difficulty swallowing  Prior Level of Function/Home Situation/Diet:  Prior to this hospitalization, the patient was living at home with son   Home Situation  Home Environment: Private residence  # Steps to Enter: 4  Rails to Enter: Yes  Hand Rails : Left  Wheelchair Ramp: No  One/Two Story Residence: One story  Living Alone: No (w/ son)  Support Systems: Family member(s), Child(prieto), [employer]  Patient Expects to be Discharged to[de-identified] Unknown  Current DME Used/Available at Home: Cane, straight  Tub or Shower Type: Tub/Shower combination (did not utilize per son and spongebathes). OBJECTIVE DATA SUMMARY:      Current Diet:   mech-soft (chopped)/nectar-thick liquids     Cognitive and Communication Status:  Mental Status  Neurologic State: Alert, Confused  Orientation Level: Oriented to person, Oriented to place  Cognition: Impulsive, Poor safety awareness  Safety/Judgement: Decreased insight into deficits  Pain:  Pain Scale 1: Numeric (0 - 10)  Pain Intensity 1: 0     Oral Assessment:     P.O. Trials:  Patient Position: HOB 60  Vocal quality prior to P.O.:  No impairment  Consistency Presented: Thin liquid, Nectar thick liquid, Puree, Mechanical soft  How Presented: Self-fed/presented, Straw, Successive swallows     Bolus Acceptance: No impairment  Bolus Formation/Control: Impaired  Type of Impairment: Delayed, Mastication  Propulsion: Delayed (# of seconds), Discoordination  Oral Residue: 10-50% of bolus, Lingual  Initiation of Swallow: Delayed (# of seconds)     Aspiration Signs/Symptoms: Clear throat, Watery eyes  Pharyngeal Phase Characteristics: Altered vocal quality, Poor endurance, Suspected pharyngeal residue  Effective Modifications: Small sips and bites  Cues for Modifications:  Moderate     Oral Phase Severity: Moderate  Pharyngeal Phase Severity : Moderate           Findings and Recommendations:  Findings and Recommendations  Evaluation Type: Eval O/P Swallow  Assessment Method(s): Observation;Palpation  Patient Position: HOB 60  Vocal Quality: No impairment  Consistency Presented: Thin liquid; Nectar thick liquid;Puree;Mechanical soft  How Presented: Self-fed/presented;Straw;Successive swallows  Bolus Acceptance: No impairment  Bolus Formation/Control: Impaired  Type of Impairment: Delayed;Mastication  Propulsion: Delayed (# of seconds); Discoordination  Oral Residue: 10-50% of bolus; Lingual  Oral Phase Severity: Moderate  Pharyngeal Phase Severity : Moderate  Initiation of Swallow: Delayed (# of seconds)  Aspiration Signs/Symptoms: Clear throat; Watery eyes     TREATMENT PLAN:  Rehab Potential : Fair   Skilled speech therapy may include:  [X] Skilled meal assessment                                              [X] Diet texture analysis                                            [X] Oral-motor/laryngeal strengthening exercises  [X] Compensatory swallow techniques    [X] Patient/caregiver education        [X]Other: MBSS if indicated     Frequency/Duration: Patient will be followed by Speech Language Pathology for 1-2 times a day for 5-6 days per week to address goals. Discharge Recommendations: Skilled Nursing Facility         COMMUNICATION/EDUCATION:   [X]         Aspiration precautions; compensatory swallow techniques  [X]         Patient/family have participated as able in POC/goal setting  [X]         Patient/family agree to work toward stated goals and plan of care. [ ]         Patient understands intent/goals of therapy, but is neutral about participation. [ ]         Patient is unable to participate in goal setting and plan of care; will contact  family/POA. Evaluation: 15 mins.      Speech-Language Pathologist:     Adriel Hwang, SLP            9/11/2017     Thank you for this referral.

## 2017-09-11 NOTE — PROGRESS NOTES
Problem: Communication Impaired (Adult)  Goal: *Acute Goals and Plan of Care (Insert Text)  STGs: To be completed in 2-3 weeks  Pt will:  1. Complete visual-spatial tasks related to functional ADLs for safe, social reintegration with 70% acc, mod visual/verbal cues  2. Recall > 7 items per concrete category with mod visual/verbal cues in 4/5 trials  3. Perform basic ADL/IADL problem solving tasks with 70% acc, with mod visual/verbal cues  4. Recall 3-5 words after delay/distraction increase recall abilities as related to community reintegration with mod visual/verbal cues in 3/5 trials   5. (NEW GOAL 9/11/17) Tolerate mech-soft, thin liquid trials without overt s/sx aspiration/distress in 4/5 trials with min visual/verbal/tactile cues  6. Utilize compensatory swallow maneuvers (decrease bolus size, decrease rate of intake, cyclical ingestion, etc.) to increase swallow function/safety with min visual, verbal and/or tactile cues in 4/5 trials. 7. Perform oral-motor/laryngeal strengthening exercises to increase swallow strength to decrease aspiration risk, min A  8. Complete an objective measure of swallow fxn (i.e., MBSS) to assess oropharyngeal anatomy/physiology for determination of safest least-restrictive diet and/or appropriate rehabilitation techniques. LTG: To be completed in 4 weeks  Pt will:  1. Complete visual spatial and abstract reasoning tasks for safe completion of ADLs for return home and community reintegration with 80% acc, min A  2. Complete working memory tasks for safe effective completion of IADLs upon return home alone, with 80% acc, min A on with min-mod visual/verbal cues in 3/5 trials   3. Patient will tolerate mech-soft/thin liquid diet with 0 clinical s/sx aspiration, min A, for meeting nutritional needs and quality of life         SPEECH LANGUAGE PATHOLOGY   DAILY TREATMENT NOTE         Patient:  Herman Mccollum (89 y.o. female)                                Date: 9/11/2017       Primary Diagnosis: HTN   Treatment Diagnosis  Treatment Diagnosis: oropharyngeal dysphagia   Treatment Diagnosis 2: increased need for assist w/ self care  Physician: Bhavna Dawkins MD  Precautions: Fall (blind L eye, monitor ortho BPs)  Mental Status:  Mental Status  Neurologic State: Alert, Confused  Orientation Level: Oriented to person, Oriented to place  Cognition: Impulsive, Poor safety awareness  Safety/Judgement: Decreased insight into deficits      OBJECTIVE DATA SUMMARY:      Neuro-Linguistics:   see below     Cognitive and Communication Status:  Neurologic State: Alert, Confused  Orientation Level: Oriented to person, Oriented to place  Cognition: Impulsive, Poor safety awareness  Safety/Judgement: Decreased insight into deficits     Dysphagia Treatment:  See below     P.O. Trials:  Patient Position: \[facility]\"" 60  Vocal quality prior to P.O.:  No impairment  Consistency Presented: Thin liquid, Nectar thick liquid, Puree, Mechanical soft  How Presented: Self-fed/presented, Straw, Successive swallows     Bolus Acceptance: No impairment  Bolus Formation/Control: Impaired  Type of Impairment: Delayed, Mastication  Propulsion: Delayed (# of seconds), Discoordination  Oral Residue: 10-50% of bolus, Lingual  Initiation of Swallow: Delayed (# of seconds)     Aspiration Signs/Symptoms: Clear throat, Watery eyes  Pharyngeal Phase Characteristics: Altered vocal quality, Poor endurance, Suspected pharyngeal residue  Effective Modifications: Small sips and bites  Cues for Modifications: Moderate     Oral Phase Severity: Moderate  Pharyngeal Phase Severity : Moderate     Response & Tolerance to Activities:    Fair      Pain:  Pain Scale 1: Numeric (0 - 10)  Pain Intensity 1: 0     ASSESSMENT:  Speech and swallow tx completed this day. Pt accepted mech-soft/nectar thick liquid trials without aspiration s/s.  Pt does require verbal cues for increased rotary manipulation and fast effortful swallows; fair carryover noted after ~10 minutes. Pt would benefit from MBS next day to assess swallow integrity and rule-out aspiration/silent aspiration 2/2 teary eyes with x 5/5 thin liquid trials. MaxA necessary for pt to turn head left to ID items of left side; significant dysmetria noted with pointing tasks. Able to name 4 items from concrete category Lizzy; 6-8 modA. Patient completed hypothetical problem solving task with 50% Lizzy, 70% Aki; 80% modA. Patient benefited from prompting and semantic cues. Patient completed list of 3 recall with immediate recall at 100%; 30 second delay at 100%; 1 minute delay at 0% Lizzy; 25% with semantic cues. (+) verbal feedback provided. It should be noted that pt was more alert and able to tend to tx this day, from initial evaluation. Progression toward goals:  [ ]         Improving appropriately and progressing toward goals  [X]         Improving slowly and progressing toward goals  [ ]         Not making progress toward goals and plan of care will be adjusted       PLAN:  Recommendations and Planned Interventions:  Patient continues to benefit from skilled intervention to address the above impairments. Continue treatment per established plan of care.   Discharge Recommendations:  Mario A[address]:     [X]        Safety precautions; aspiration precautions/risks; compensatory swallow techniques  [X]        Compensatory communication techniques  [ ]        Internal/external memory techniques     Treatment Time:  45  Minutes  Speech 30 min/Swallow 15 min     Therapist:    Leandro Hardy, SLP        9/11/2017

## 2017-09-11 NOTE — PROGRESS NOTES
[facility]: [phone] ([phone])  [facility]: [phone]   [facility]: [phone]    Patient Name: Daniel Christine  YOB: 1925    Date of Initial Consult: 9-7-17  Reason for Consult: Care Decisions  Requesting Provider: Gareth Zapata MD   Primary Care Physician: Shanon Pollard., DO      SUMMARY:   Daniel Christine is a 80y.o. year old with a past history of HTN, HLD, GERD, Dementia, who was admitted on 9/6/2017 from ER/Home with a diagnosis of facial droop/AMS. Current medical issues leading to Palliative Medicine involvement include: patient found to have a CVA, asked to support patient/family to establish goals of care. PALLIATIVE DIAGNOSES:   1. AMS  2. CVA  3. Dementia  4. Debility  5. CKD stage 3       PLAN:   1. I met with patient she is alert, had been working with PT, knew her name and where she was, and that she had suffered a stroke, but not correct on her age, the year or who was POTUS. She denies pain, had no complaints. Son was called and agreed to meet me later. 2. AMS-son voiced great concern, that prior to the weekend she started to decline, he thinks she is now better, again returning closer to her baseline  3. CVA-patient aware she suffered a CVA, son understands that she could have future events in the future. 4. Dementia-patient has needed most of her help with  IADLS-was not having any behaviors at home that were cause for alarm, no danger issues. Son has been her sole caregiver for 7 yrs, we had lengthy discussion about the burdens of caregiving. 5. Debility-she had been continent of urine and stool, prior to the CVA, had walked with a cane only when outside the home. Fed self, had no swallowing issues, in fact loved to eat out lunch. Stopped showers, only did sink bath for 13 yrs and did dress self.  She liked to care for her 2 cats, did like to be outside, watering the plants, would play cards and watch TV.  6. CKD Stage 3-long conversation with son, that she had a rather marked decline in her already decreased kidney function, that it was believed to be due to her POOR ORAL INTAKE or DEHYDRATION. We talked about why she is not drinking adequately, that she doesn't have a normal thirst mechanism, that she is apathetic from the recent CVA or all of these reasons. Son shared that even prior to admission he needed to raymundo her to drink. We talked about the use of IV and IVF to rescue her, that this is not a permanent fix and he understands this. He is hoping that having been given IVF she is now more like her self and will improve but he is OK to try RITALIN if this will improve her interest in eating/drinking. He knows that if this is not effective than she will likely have a marked decline in her renal function and then we would be discussing a switch to [facility]. 7. Goals of Care and Code Status-during our last visit he completed a POST-electing DNR/DNI, Limited Interventions, ie to return to hospital for future care and tests but not to have a feeding tube. DNR/DNI entered into EMR. Patient now with IVF to correct dehydration/IVANA, which has improved her sensorium some, the issue is whether she will sustain herself eating/drinking, once the IVF is stopped. Son is clearly stressed, have offered him to call me if he needs to discuss any issues. Will advise Dr Anuj Hicks that son OK with TRIAL OF RITALIN. 8. Initial consult note routed to primary continuity provider  9.  Communicated plan of care with: Palliative IDT, RN, PCP       GOALS OF CARE:     [====Goals of Care====]  GOALS OF CARE:  Patient / health care proxy stated goals: to improve and consider return home, vs senior living/NF      TREATMENT PREFERENCES:   Code Status: DNR    Advance Care Planning:  Advance Care Planning 9/6/2017   Patient's Healthcare Decision Maker is: Legal Next of Richard Oliver   Primary Decision Maker Name Efren Ledezma   Primary Decision [employer] Number [phone]   Primary Decision Maker Relationship to Patient -   Confirm Advance Directive None   Patient Would Like to Complete Advance Directive No   Does the patient have other document types (No Data)       Other:    The palliative care team has discussed with patient / health care proxy about goals of care / treatment preferences for patient.  [====Goals of Care====]    Advance Care Planning 9/6/2017   Patient's Healthcare Decision Maker is: Legal Next of Richard Oliver   Primary Decision Maker Name Mehnaz Elizondo   Primary Decision Maker Phone Number [phone]   Primary Decision Maker Relationship to Patient -   Confirm Advance Directive None   Patient Would Like to Complete Advance Directive No   Does the patient have other document types (No Data)      Cliff Luong-Phone number: [phone]     HISTORY:     History obtained from: Chart    CHIEF COMPLAINT: AMS/Facial Droop    HPI/SUBJECTIVE:  97.7, 87, 108/68, 17 on RA qt 95%, Wt 157lb, Stooled 9-10  MAR-ASA, Lipitor, Plavix, Lovenox, HCTZ, Lisinopril  LABS-WBC 7.2, H&H 11.6 & 34.6, Plat 242, INR 1.2, HgbA1c 6, Albumin 3, Bun/Creat 27/1.31->57/3.4->51/2.13, TSH 2.23,   ECHO-Left ventricle: Systolic function was normal. Ejection fraction was estimated   in the range of 55 % to 60 %. There were  no regional wall motion abnormalities. The study was not technically   sufficient to allow evaluation of LV diastolic  function. Right ventricle: Systolic function was normal.    Atrial septum: Techinically difficult bubble study. There was no obvious   evidence of intracardiac shunt by  peripherally-administered agitated saline contrast.    Aortic valve: There was no stenosis. Aorta, systemic arteries: There was mild dilatation of the ascending aorta. The ascending aortic AP dimension was 40  mm. CT head-Acute/subacute right parietal occipital lobe infarct     2.  Chronic infarcts involving the right parietal occipital lobe and bilateral  basal ganglia  MRA Brain-. Occlusion proximal right PCA correlating with right PCA territory infarct. Mild adjacent mass effect is seen.     2. Additional multifocal areas of anterior and posterior circulation stenosis  including moderate to severe proximal left PCA, severe left SHANE, and distal  right M1 segment.     3. No intracranial aneurysm is seen  9-5 Bladder scan pre-void: 189ml, post void 98ml  SLP-Dysphagia Dental soft, thin liquids  Neuro-Right PCA Territory Stroke  Already has right parieto-occipital lobe encephalomalacia. New stroke in Right PCA territory. Given already significant atrophy of brain and encephalomalacia I do not think  Cerebral edema will pose a problem. Was on on aspirin before. Will attempt MRA brain w/o contrast to look for intracranial stenosis. however the entire right PCA is looks completely infarcted so likely will not significantly change. Will get carotid U/S, and echo-unrevealing. Aspirin 325 mg daily Lipitor 80 mg. - take  mg for 90 days along with 75 mg Plavix and after 90 days stop Plavix and continue with ASA  BP already well controlled. Continue BP control.     The patient is:   [] Verbal and participatory  [x] Non-participatory due to:   Not fully oriented today     Clinical Pain Assessment (nonverbal scale for nonverbal patients): Pain: 0denies         FUNCTIONAL ASSESSMENT:     Palliative Performance Scale (PPS):50%          PSYCHOSOCIAL/SPIRITUAL SCREENING:     Advance Care Planning:  Advance Care Planning 9/6/2017   Patient's Healthcare Decision Maker is: Legal Next of Kin   Primary Decision Maker Name Arlette Stacy   Primary Decision Maker Phone Number [phone]   Primary Decision Maker Relationship to Patient -   Confirm Advance Directive None   Patient Would Like to Complete Advance Directive No   Does the patient have other document types (No Data)        Any spiritual / Mandaen concerns:  [] Yes /  [x] No    Caregiver Burnout:  [] Yes /  [x] No /  [] No Caregiver Present      Anticipatory grief assessment:   [x] Normal  / [] Maladaptive       ESAS Anxiety: Anxiety: 0    ESAS Depression: Depression: 0        REVIEW OF SYSTEMS:     Positive and pertinent negative findings in ROS are noted above in HPI. Son provided much of the history, no pain, was continent of B&B, now incontinent and had  stool 9-10. Had been eating and drinking poorly since [facility] admission. The following systems were [] reviewed / [x] unable to be reviewed as noted in HPI  Other findings are noted below. Systems: constitutional, ears/nose/mouth/throat, respiratory, gastrointestinal, genitourinary, musculoskeletal, integumentary, neurologic, psychiatric, endocrine. Positive findings noted below. Modified ESAS Completed by: provider   Fatigue: 3 Drowsiness: 0   Depression: 0 Pain: 0   Anxiety: 0 Nausea: 0   Anorexia: 6 Dyspnea: 0     Constipation: No     Stool Occurrence(s): 1        PHYSICAL EXAM:     Wt Readings from Last 3 Encounters:   09/06/17 71.2 kg (157 lb)   09/03/17 73.5 kg (162 lb)   07/26/17 72.7 kg (160 lb 3.2 oz)     Blood pressure 115/66, pulse 83, temperature 98.7 °F (37.1 °C), resp. rate 18, height 5' 4\" (1.626 m), weight 71.2 kg (157 lb), SpO2 95 %, not currently breastfeeding.   Pain:  Pain Scale 1: Numeric (0 - 10)  Pain Intensity 1: 0  Pain Onset 1: now  Pain Location 1: Generalized  Pain Orientation 1: Left, Right  Pain Description 1: Aching  Pain Intervention(s) 1: Medication (see MAR), Repositioned, Rest  Last bowel movement: 9-10    Constitutional: was alert and oriented to self, location, dx  No distress-breathing unlabored     HISTORY:     Active Problems:    Debility (9/7/2017)      Altered mental status (9/7/2017)      CKD (chronic kidney disease) stage 3, GFR 30-59 ml/min (9/7/2017)      Past Medical History:   Diagnosis Date    Advance directive in chart 7/25/2016    Gastrointestinal disorder     GERD (gastroesophageal reflux disease)     GERD (gastroesophageal reflux disease) 11/8/2012    HTN (hypertension) 11/8/2012    Hypercholesterolemia     Hypertension     Pure hypercholesterolemia 11/8/2012      Past Surgical History:   Procedure Laterality Date    HX GYN      HX ORTHOPAEDIC        Family History   Problem Relation Age of Onset    Cancer Father      History reviewed, no pertinent family history. Social History   Substance Use Topics    Smoking status: Never Smoker    Smokeless tobacco: Never Used    Alcohol use No     Allergies   Allergen Reactions    Pcn [Penicillins] Unable to Obtain      Current Facility-Administered Medications   Medication Dose Route Frequency    dextrose 5 % - 0.45% NaCl infusion  100 mL/hr IntraVENous CONTINUOUS    [facility] T[facility] ANESTHESIA   Other PRN    [facility] TCC EMERGENCY/STAT BOX   Other PRN    acetaminophen (TYLENOL) tablet 650 mg  650 mg Oral Q4H PRN    aspirin (ASPIRIN) tablet 325 mg  325 mg Oral DAILY    atorvastatin (LIPITOR) tablet 80 mg  80 mg Oral QHS    clopidogrel (PLAVIX) tablet 75 mg  75 mg Oral DAILY    enoxaparin (LOVENOX) injection 30 mg  30 mg SubCUTAneous Q24H    glucose chewable tablet 16 g  4 Tab Oral PRN    glucagon (GLUCAGEN) injection 1 mg  1 mg IntraMUSCular PRN    dextrose (D50W) injection syrg 12.5-25 g  25-50 mL IntraVENous PRN        LAB AND IMAGING FINDINGS:     Lab Results   Component Value Date/Time    WBC 11.6 09/08/2017 08:05 AM    HGB 13.0 09/08/2017 08:05 AM    PLATELET 147 69/09/7191 08:05 AM     Lab Results   Component Value Date/Time    Sodium 138 09/11/2017 02:20 PM    Potassium 3.7 09/11/2017 02:20 PM    Chloride 105 09/11/2017 02:20 PM    CO2 25 09/11/2017 02:20 PM    BUN 37 09/11/2017 02:20 PM    Creatinine 1.39 09/11/2017 02:20 PM    Calcium 7.6 09/11/2017 02:20 PM      Lab Results   Component Value Date/Time    AST (SGOT) 18 09/03/2017 11:20 AM    Alk.  phosphatase 79 09/03/2017 11:20 AM    Protein, total 6.8 09/03/2017 11:20 AM    Albumin 3.0 09/03/2017 11:20 AM    Globulin 3.8 09/03/2017 11:20 AM     Lab Results   Component Value Date/Time    INR 1.2 09/03/2017 11:20 AM    Prothrombin time 14.7 09/03/2017 11:20 AM    aPTT 21.5 06/12/2013 11:00 AM      No results found for: IRON, FE, TIBC, IBCT, PSAT, FERR   No results found for: PH, PCO2, PO2  No components found for: [place]   Lab Results   Component Value Date/Time    CK 89 09/03/2017 11:20 AM    CK - MB <1.0 09/03/2017 11:20 AM              Total time: 70  Counseling / coordination time, spent as noted above: 45  > 50% counseling / coordination?: yes with patient, son  Prolonged service was provided for  [x]30 min   []75 min in face to face time in the presence of the patient, spent as noted above. Time Start: 3:45 pm  Time End: 4:30pm  Note: this can only be billed with  (initial) or 21 [phone] (follow up). If multiple start / stop times, list each separately.

## 2017-09-11 NOTE — PROGRESS NOTES
Problem: Mobility Impaired (Adult and Pediatric)  Goal: *Acute Goals and Plan of Care (Insert Text)  PHYSICAL THERAPY STG GOALS :  Initiated 9/7/2017 and to be accomplished within 2 Weeks    1. Patient will move from supine to sit and sit to supine , scoot up and down and roll side to side in bed with minimal assistance/contact guard assist.   2. Patient will transfer from bed to chair and chair to bed with minimal assistance/contact guard assist using RW. 3. Patient will perform sit to stand with minimal assistance/contact guard assist with Fair balance and safety awareness. 4. Patient will ambulate with moderate assistance for 25 feet with RW on level surfaces with 2 turns. PHYSICAL THERAPY LTG GOALS :  Initiated 9/7/2017 and to be accomplished within 6 Weeks    1. Patient will move from supine to sit and sit to supine , scoot up and down and roll side to side in bed with supervision/set-up. 2. Patient will transfer from bed to chair and chair to bed with supervision/set-up using RW. 3. Patient will perform sit to stand with RW supervision/set-up with Good balance and safety awareness. 4. Patient will ambulate with supervision/set-up for 100 feet with RW on level surfaces and be able to maneuver through narrow spaces and obstacles without loss of balance. 5. Patient will ascend/descend TBA. Morris Lovell Physical Therapist: Rekha Kwong, PT on 9/7/2017    [facility]ARE [place]   PHYSICAL THERAPY DAILY TREATMENT NOTE        Patient:  Kristin Santos (29 y.o. female)               Date: 9/11/2017    Physician: Jacek Tenorio MD  Primary Diagnosis: HTN          Treatment Diagnosis  Treatment Diagnosis: cognitive linguistic deficits  Treatment Diagnosis 2: increased need for assist w/ self care  Precautions: Fall (blind L eye, monitor ortho BPs)  Vital Signs  Cognitive Status:  Pain  Pain Screen  Pain Scale 1: Numeric (0 - 10)  Pain Intensity 1: 0  Patient Stated Pain Goal: 0  Pain Reassessment 1: Yes  Bed Mobility Training  Bed Mobility Training  Rolling: Moderate assistance; Additional time  Supine to Sit: Moderate assistance; Additional time  Sit to Supine: Maximum assistance  Scooting: Maximum assistance  Interventions: Safety awareness training;Verbal cues; Tactile cues;Manual cues  Balance  Sitting: Impaired; With support  Sitting - Static: Prop sitting;Poor (constant support)  Sitting - Dynamic: Poor (constant support)  Transfer Training  Gait Training  Therapeutic Exercise: Pt presented supine in bed. Supine ankle pumps 2x10 to promote circulation as pt reported feeling cold. Bed mobility rendered to include rolling L<>R with use of bed rails, supine<>sit and scooting in bed. Therapist instructed pt on bridging techniques for ease of repositioning in bed. Pt able to complete bridging, but unable to bridge and scoot in unison for effective scooting. Pt required Max A for repositioning in bed. Pt sat EOB ~6' with constant support. Pt required verbal cues to look up and to maintain midline posture while seated. Pt unable to self support at EOB due to forward lean and safety concerns. Pt reported headache with sitting at EOB. Supine BP assessed at 156/65, HR:72. Therapist reported to [place]. Pt supine in bed with call bell and bed alarm. Patient/Caregiver Education:   Pt /Caregiver Education on safety and fall prevention was provided to reduce risk of falls. ASSESSMENT:  Patient continues to benefit from Skilled PT services to improve bed mobility, strength, balance, transfers, gait. Progression toward goals:  [ ]      Improving appropriately and progressing toward goals  [X]      Improving slowly and progressing toward goals  [ ]      Not making progress toward goals and plan of care will be adjusted      Treatment session: 51 minutes.   Therapist:   Jerry Jefferson PTA,          9/11/2017

## 2017-09-11 NOTE — ROUTINE PROCESS
Written shift change report completed by Cal Joel RN (offgoing nurse). Report included the following information SBAR, Kardex and MAR.  24 hour chart check completed, pt visually checked q 1 hour by nursing staff

## 2017-09-11 NOTE — ROUTINE PROCESS
Patient: Viki Corbett (1 y.o. female)                         Date: 9/11/2017    Primary Diagnosis: HTN      Attending Physician: Mey Robles MD   Treatment Diagnosis  Treatment Diagnosis: oropharyngeal dysphagia   Treatment Diagnosis 2: increased need for assist w/ self care                                                                  Based on adm information 9/6-8. Pta Mrs. Bryce Cruz lived with her son. Plan dc is to return home with [facility]  services but possible need for placement. Pta Mrs. Bryce Cruz was modified independent with ADLs   and mobility, used a cane. Her son assisted with household tasks. On eval she requires max assist   for bed mobility and sit-to-stand. She is limited by decreased cognition, hemiparesis, poor sitting  balance. Transfers and ambulation not tested during this period d/t limitations. Dc goal is moderate  assistance for [id number].   MDS Section GG  Data Summary Tool Key:        01   Dependent      02   Substantial/Maximal              Assistance      03    Partial/Moderate            Assistance      04    Supervision or            Touching Assistance      05    Set-up or [address]    Resident Refused      09    not applicable      88    Not Attempted d/t            Medical or Safety           Admission  Usual Performance Score Discharge Goal Discharge Performance Score    Eating 3      Oral Hygiene 2      Toilet Hygiene 1      Sit to Lying 2      Lying to sitting on SOB 2 3     Sit to stand 2      Chair/Chair -to- bed Transfer 8      Toilet Transfer 8      Walk 50 ft. , 2 turns 8      Walk  150 ft. 8      Wheel 50 ft. , 2 turns 8      Wheel 150 ft. 9

## 2017-09-11 NOTE — PROGRESS NOTES
Problem: Self Care Deficits Care Plan (Adult)  Goal: *Acute Goals and Plan of Care (Insert Text)  OCCUPATIONAL THERAPY SHORT TERM GOALS   Initiated 9/7/2017 and to be accomplished within 2 Week(s)    1. Patient will perform Upper body ADLs utilizing hemitechnique with moderate assistance . 2. Patient will perform Lower body ADLs utilizing hemitechnique & adaptive equipment with maximal assistance . 3. Patient will perform toileting task with maximal assistance x 2 person assist with Fair safety to reduce falls risk. 4. Patient will perform functional transfers with Rolling Walker and moderate assistance x 2 person assist.  5. Patient will perform standing static/dynamic balance activities for improved ADL/IADL function with maximal assistance x 2 person assist and Fair balance and safety awarenes. 6. Patient will improve Barthel index scores to a tleast 20/100 to improve functional mobility. 7. Patient will perform self feeding tasks with Mod A utilizining adaptive equipment and compensatory techniques. 8. Patient will increase left sided awareness with min verbal and tactile cues for ADL performance skills and functional transfers. OCCUPATIONAL THERAPY LONG TERM GOALS   Initiated 9/7/2017 and to be accomplished within 4 Week(s)    1. Patient will perform Upper body ADLs with/without adaptive equipment with supervision/set-up. 2. Patient will perform Lower body ADLs with/without adaptive equipment with supervision/set-up . 3. Patient will perform toileting task with supervision/set-up with Good safety to reduce falls risk. 4. Patient will perform functional transfers with Rolling Walker and supervision/set-up and Good balance and safety awareness. 5. Patient will perform standing static/dynamic activity for improved ADL/IADL function with supervision/set-up an and Good balance and safety awareness.   6. Patient will improve Barthel index score to 50/100 to improve independence with mobility. Therapist: Lennie Oreilly 9/7/2017   [facility]   OCCUPATIONAL THERAPY DAILY TREATMENT NOTE        Patient: Herman Mccollum (36 y.o. female)                   Date: 9/11/2017  Attending Physician: Tami Hicks MD  Primary Diagnosis: HTN    Treatment Diagnosis  Treatment Diagnosis: cognitive linguistic deficits  Treatment Diagnosis 2: increased need for assist w/ self care   Precautions : Precautions at Admission: Fall (blind L eye, monitor ortho BPs)  Vital Signs:        Cognitive Status:  Mental Status  Neurologic State: Alert;Confused  Orientation Level: Oriented to person;Oriented to place  Cognition: Impulsive;Poor safety awareness  Pain:  Pain Screen  Pain Scale 1: Numeric (0 - 10)  Pain Intensity 1: 0  Patient Stated Pain Goal: 0  Pain Scale 1: Numeric (0 - 10)  Gross Assessment:     Coordination:     Bed Mobility:  Bed Mobility  Rolling: Moderate assistance; Additional time  Supine to Sit: Moderate assistance; Additional time  Sit to Supine: Maximum assistance  Scooting: Maximum assistance;Assist x2  Transfers:        Balance:  Balance  Sitting: Impaired; With support  Sitting - Static: Prop sitting;Poor (constant support)  Sitting - Dynamic: Poor (constant support)  ADL Self Care:     ADL Intervention:           Toileting  Toileting Assistance: Total assistance(dependent)  Bladder Hygiene: Total assistance (dependent)  Clothing Management: Total assistance (dependent)        Feeding  Feeding Assistance: Minimum assistance  Drink to Mouth: Minimum assistance     Therapeutic Activities:  Assisted patient and nursing staff with toileting routine, bed level, and repositioning in order to assess independence and performance with task. See above for levels of A needed. Patient requires cueing for utilizing L UE during rolling technique for optimal independence and performance during to L side neglect.  Assisted patient with drinking [place] shake to assess independence and performance with task. ALEXANDER encouraged patient to utilize L UE for optimal strengthening and performance. Patient demonstrated increased difficulty locating straw during task however able to hold with B UE with Min A. Functional reach activity with focus on crossing midline as well as increasing visual scanning towards L for optimal independence and safety with ADL tasks and transfers. Therapeutic Exercises:   AROM of L UE, 2 sets x 5 reps in order to increase L UE strengthening and ROM needed for UB ADLS. Cueing needed for slow control movements for optimal strengthening. Patient/Caregiver Education:    Yovani Jones Education on see above.         ASSESSMENT:  Patient continues to demonstrate the need for skilled Occupational Therapy services to improve static sitting balance needed for upper body dressing  Progression toward goals:  [ ]      Improving appropriately and progressing toward goals  [X]      Improving slowly and progressing toward goals  [ ]      Not making progress toward goals and plan of care will be adjusted      Treatment session:   51 minutes     Therapist:    CATHERINE Aggarwal,  9/11/2017

## 2017-09-11 NOTE — PROGRESS NOTES
GIM     Patient: Juan Espana MRN: [id number]  CSN: [id number]    YOB: 1925  Age: 80 y.o. Sex: female    DOA: 9/6/2017 LOS:  LOS: 5 days                    Subjective:     Pt much more alert with hydration and renal funct better. Discussed with pallitive care and appreciate there help. Will monitor BMP. Still with L efren and neglict and R gaze preference. Would wounder about pregression of CVA    Objective:      Visit Vitals    /66 (BP 1 Location: Right arm, BP Patient Position: At rest)    Pulse 83    Temp 98.7 °F (37.1 °C)    Resp 18    Ht 5' 4\" (1.626 m)    Wt 71.2 kg (157 lb)    SpO2 95%    Breastfeeding No    BMI 26.95 kg/m2       Physical Exam:  Chest clear  Cor rr  abd soft  No edema      Intake and Output:  Current Shift:     Last three shifts:       No results found for this or any previous visit (from the past 24 hour(s)).     Current Facility-Administered Medications   Medication Dose Route Frequency    dextrose 5 % - 0.45% NaCl infusion  100 mL/hr IntraVENous CONTINUOUS    [facility] ANESTHESIA   Other PRN    [facility] EMERGENCY/STAT BOX   Other PRN    acetaminophen (TYLENOL) tablet 650 mg  650 mg Oral Q4H PRN    aspirin (ASPIRIN) tablet 325 mg  325 mg Oral DAILY    atorvastatin (LIPITOR) tablet 80 mg  80 mg Oral QHS    clopidogrel (PLAVIX) tablet 75 mg  75 mg Oral DAILY    enoxaparin (LOVENOX) injection 30 mg  30 mg SubCUTAneous Q24H    glucose chewable tablet 16 g  4 Tab Oral PRN    glucagon (GLUCAGEN) injection 1 mg  1 mg IntraMUSCular PRN    dextrose (D50W) injection syrg 12.5-25 g  25-50 mL IntraVENous PRN       Lab Results   Component Value Date/Time    Glucose 105 09/10/2017 04:10 AM    Glucose 126 09/09/2017 04:00 AM    Glucose 101 09/08/2017 08:05 AM    Glucose 120 09/03/2017 11:20 AM    Glucose 104 07/20/2017 09:17 AM        Assessment/Plan     Active Problems:    Debility (9/7/2017)      Altered mental status (9/7/2017)      CKD (chronic kidney disease) stage 3, GFR 30-59 ml/min (9/7/2017)        Mey Robles MD  9/11/2017, 1:12 PM

## 2017-09-12 NOTE — ROUTINE PROCESS
Bedside and Verbal shift change report given to Jean Carlos Huang RN (oncoming nurse) by Mary Chavarria RN (offgoing nurse). Report included the following information SBAR, Kardex and MAR. Hourly rounds made.

## 2017-09-12 NOTE — PROGRESS NOTES
Problem: Self Care Deficits Care Plan (Adult)  Goal: *Acute Goals and Plan of Care (Insert Text)  OCCUPATIONAL THERAPY SHORT TERM GOALS   Initiated 9/7/2017 and to be accomplished within 2 Week(s)    1. Patient will perform Upper body ADLs utilizing hemitechnique with moderate assistance . 2. Patient will perform Lower body ADLs utilizing hemitechnique & adaptive equipment with maximal assistance . 3. Patient will perform toileting task with maximal assistance x 2 person assist with Fair safety to reduce falls risk. 4. Patient will perform functional transfers with Rolling Walker and moderate assistance x 2 person assist.  5. Patient will perform standing static/dynamic balance activities for improved ADL/IADL function with maximal assistance x 2 person assist and Fair balance and safety awarenes. 6. Patient will improve Barthel index scores to a tleast 20/100 to improve functional mobility. 7. Patient will perform self feeding tasks with Mod A utilizining adaptive equipment and compensatory techniques. 8. Patient will increase left sided awareness with min verbal and tactile cues for ADL performance skills and functional transfers. OCCUPATIONAL THERAPY LONG TERM GOALS   Initiated 9/7/2017 and to be accomplished within 4 Week(s)    1. Patient will perform Upper body ADLs with/without adaptive equipment with supervision/set-up. 2. Patient will perform Lower body ADLs with/without adaptive equipment with supervision/set-up . 3. Patient will perform toileting task with supervision/set-up with Good safety to reduce falls risk. 4. Patient will perform functional transfers with Rolling Walker and supervision/set-up and Good balance and safety awareness. 5. Patient will perform standing static/dynamic activity for improved ADL/IADL function with supervision/set-up an and Good balance and safety awareness.   6. Patient will improve Barthel index score to 50/100 to improve independence with mobility. Therapist: Raghavendra Hassan 9/7/2017   [facility]   OCCUPATIONAL THERAPY DAILY TREATMENT NOTE        Patient: Daniel Christine (10 y.o. female)                   Date: 9/12/2017  Attending Physician: Ayla Oviedo MD  Primary Diagnosis: HTN    Treatment Diagnosis  Treatment Diagnosis: oropharyngeal dysphagia   Treatment Diagnosis 2: cognitive-linguistic deficit   Precautions : Precautions at Admission: Fall (blind L eye, monitor ortho BPs)  Vital Signs:  Vital Signs  Cardiac Rhythm: Sinus arrhythmia     Cognitive Status:  Mental Status  Neurologic State: Alert  Orientation Level: Oriented to person;Oriented to place  Cognition: Follows commands  Pain:  Pain Screen  Pain Scale 1: Numeric (0 - 10)  Pain Intensity 1: 0  Patient Stated Pain Goal: 0  Pain Scale 1: Numeric (0 - 10)  Gross Assessment:     Coordination:     Bed Mobility:  Bed Mobility  Supine to Sit: Moderate assistance; Additional time  Scooting: Moderate assistance; Additional time;Assist x2  Transfers:  Functional Transfers  Sit to Stand: Moderate assistance  Stand to Sit: Maximum assistance     Balance:  Balance  Sitting: Impaired; With support  Sitting - Static: Prop sitting;Poor (constant support)  Sitting - Dynamic: Poor (constant support)  Standing: With support; Impaired  Standing - Static: Poor  Standing - Dynamic : Poor  Therapeutic Activities:  Co-treated with PT for optimal functional gains with functional transfers needed for ADL tasks. Assisted patient with bed mobility and bed <>w/c transfers in order to assess safety and independence during task. See above for levels of A needed. Patient demonstrated increased fear of falling and difficulty with B LE mobility during transfer. Functional reach activity, with focus on reaching forward and visual scanning towards L side for optimal independence and performance with ADL tasks and transfers.  Patient demonstrated mod-max cueing to visual scan towards L as well as increase L UE ROM for optimal strengthening. CATHERINE placed L UE arm trough on patient's w/c in order to increase L UE awarness as well as support for optimal strengthening and mobility. Patient/Caregiver Education:    Pt. Celine Luna Education on see above.         ASSESSMENT:  Patient continues to demonstrate the need for skilled Occupational Therapy services to improve static sitting balance needed for upper body dressing  Progression toward goals:  [ ]      Improving appropriately and progressing toward goals  [X]      Improving slowly and progressing toward goals  [ ]      Not making progress toward goals and plan of care will be adjusted      Treatment session:  53 minutes     Therapist:    Tollie Cabot, COTA,  9/12/2017

## 2017-09-12 NOTE — PROGRESS NOTES
Pt refused to have blood drawn, pt attempted to hit the phlebotomist when she applied the tourniquet, she attempted x 2, attempted to reorient patient, unsuccessful

## 2017-09-12 NOTE — ROUTINE PROCESS
Bedside shift change report given to Kimberley rn (oncoming nurse) by lavelle rn (offgoing nurse). Report included the following information Kardex, MAR and Recent Results.

## 2017-09-12 NOTE — ROUTINE PROCESS
Bedside and Verbal shift change report given to [facility] (oncoming nurse) by Tho Davidson RN (offgoing nurse). Report included the following information SBAR, Kardex and MAR.  24 hour chart check completed, pt visually checked q 1 hour by nursing staff

## 2017-09-12 NOTE — PROGRESS NOTES
conducted a Follow up consultation and Spiritual Assessment for Viki Corbett, who is a 80 y.o.,female. The  provided the following Interventions:  Continued the relationship of care and support. Listened empathically. Offered prayer and assurance of continued prayer on patients behalf. Chart reviewed. The following outcomes were achieved:  Patient expressed gratitude for pastoral care visit. Assessment:  There are no further spiritual or L[employer] issues which require Spiritual Care Services interventions at this time. Plan:  Chaplains will continue to follow and will provide pastoral care on an as needed/requested basis.  recommends bedside caregivers page  on duty if patient shows signs of acute spiritual or emotional distress.      [facility]   Staff [address] [place] [address]   [phone]

## 2017-09-12 NOTE — PROGRESS NOTES
Outpatient Modified Barium Swallow Evaluation    Patient: Victorino Holman (80 y.o. female)  Date: 9/12/2017  Primary Diagnosis: Other dysphagia [R13.19]        Precautions: L neglect; dysmetria; falls; aspiration       Videofluoroscopy Results  MBS completed with repeated trace aspiration with thin liquids via straw; difficulty creating seal from cup with L labial weakness/droop. Attempted chin tuck and head turns for airway protection; unsuccessful. No aspiration with nectar + straw, pudding, cracker, and 13 mm Ba+ tablet with nectar-thick liquid wash. Consistently delayed oral bolus cohesion and propulsion across all study trials with premature spillage into valleculae. Mild swallow delay (~2 seconds). Decreased hyolaryngeal excursion resulting in residuals in valleculae and pyriforms; pt able to clear with cues for throat clears and double swallows. Pt presents with moderate oropharyngeal dysphagia, as evidenced above, which places pt at risk for aspiration. At this time, safest for mech-soft solids (chopped meats), nectar-thick liquid diet. Meds should be presented one at a time with nectar. SLP utilized video of study for visual feedback, education, and recommendations for pt; requires encouragement. Results d/w TCC interdisciplinary team.       Video Flouroscopic Procedures  [x] Lateral View   [] A-P View [] Scanned to level of Sternum    [x] Seated at 90 deg.   [] Other:    Presentation:    [] Spoon   [] Cup   [] Straw   [] Syringe   [] Consecutive Swallows  [] Other:    Consistencies:   [x] Ba+ liquid   [x] Ba+ liquid (nectar)   [] Ba+ liquid (honey)   [x] Ba+ puree [x] Ba+ cookie [x] 13 mm Ba pill with nectar Ba wash    Testing Discontinued:   [] Due to:    Treatment Techniques Attempted  [x] Head Turn: [x] Right [x] Left     [] Head Tilt: [] Right [] Left     [x] Chin Down:  [] Small Sips/bites:  [] Effortful swallow:  [] Double swallow:  [] Other:    Results  Dysphagia Present:     [x] Yes  [] No    Ratings of Dysphagia:     [] Mild  [x] Moderate  [] Severe    Stages of Breakdown:   [x] Oral  [x] Pharyngeal  [] Esophageal    Aspiration:   [x] Yes    [] No  [x] At Risk     Cough: [] Yes      [x] No     Penetration:   [] Yes    [] No     Cough: [] Yes      [x] No   [] Flash/trace   [x] Mod   [x] To Chords          Consistency Aspirated:   Consistency Penetrated:   [x] Thin Liquid     [x] Thin Liquid  [] Nectar-thick Liquid    [] Nectar-thick Liquid   [] Honey-thick Liquid    [] Honey-thick Liquid   [] Puree     [] Puree  [] Solid     [] Solid  [] 13 mm Ba pill with     [] 13 mm Ba pill with      Motility Problems with:  [] Lip Closure:    [] Mastication:   [x] Bolus Formation/control:   [x] A-P Transport:  [] Tongue Base Retraction:  [x] Swallow Response (delayed):  [] Velopharyngeal Closure:  [x] Laryngeal Elevation/adduction:  [] Epiglottic Inversion:  [] Pharyngeal motility/sensation:  [] Cricopharyngeal Relaxation:  [] Esophageal Peristalsis:  [] Other:    Timing of Aspiration/Penetration:  [] Before Swallow:  [x] During Swallow:  [] After Swallow:    Transit Time Delay:  [x] >1 Second  Oral  [x] >1 Second Pharyngeal  [] >20 Second Esophageal     Residuals:  [x] Vallecula:    [x] Mild  [] Mod  [] Severe  [x] Pyriform Sinus:   [x] Mild  [] Mod  [] Severe  [] Posterior Pharyngeal Wall:  [] Mild  [] Mod  [] Severe    GCODESwallowing:  Swallow Current Status CK= 40-59%   Swallow Goal Status CK= 40-59%   Swallow D/C Status CK= 40-59%    The severity rating is based on the following outcomes:    National Outcomes Measures (NOMS) - LANDON Noms Swallow Level 5  8-point Penetration-Aspiration Scale - Score 8  Professional Judgement

## 2017-09-12 NOTE — PROGRESS NOTES
Problem: Mobility Impaired (Adult and Pediatric)  Goal: *Acute Goals and Plan of Care (Insert Text)  PHYSICAL THERAPY STG GOALS :  Initiated 9/7/2017 and to be accomplished within 2 Weeks    1. Patient will move from supine to sit and sit to supine , scoot up and down and roll side to side in bed with minimal assistance/contact guard assist.   2. Patient will transfer from bed to chair and chair to bed with minimal assistance/contact guard assist using RW. 3. Patient will perform sit to stand with minimal assistance/contact guard assist with Fair balance and safety awareness. 4. Patient will ambulate with moderate assistance for 25 feet with RW on level surfaces with 2 turns. PHYSICAL THERAPY LTG GOALS :  Initiated 9/7/2017 and to be accomplished within 6 Weeks    1. Patient will move from supine to sit and sit to supine , scoot up and down and roll side to side in bed with supervision/set-up. 2. Patient will transfer from bed to chair and chair to bed with supervision/set-up using RW. 3. Patient will perform sit to stand with RW supervision/set-up with Good balance and safety awareness. 4. Patient will ambulate with supervision/set-up for 100 feet with RW on level surfaces and be able to maneuver through narrow spaces and obstacles without loss of balance. 5. Patient will ascend/descend TBA. Luis Angel Mohan Physical Therapist: Ulysses Epps, PT on 9/7/2017    [facility] [place]   PHYSICAL THERAPY DAILY TREATMENT NOTE        Patient:  Juan Espana (67 y.o. female)               Date: 9/12/2017    Physician: Rob Tan MD  Primary Diagnosis: HTN          Treatment Diagnosis  Treatment Diagnosis: oropharyngeal dysphagia   Treatment Diagnosis 2: cognitive-linguistic deficit  Precautions: Fall (blind L eye, monitor ortho BPs)  Vital Signs  Cognitive Status:  Mental Status  Neurologic State: Alert  Orientation Level: Oriented to person;Oriented to place  Cognition: Follows commands  Pain  Bed Mobility Training  Bed Mobility Training  Supine to Sit: Moderate assistance; Additional time  Scooting: Moderate assistance; Additional time  Interventions: Safety awareness training;Verbal cues  Balance  Sitting: Impaired; With support  Sitting - Static: Prop sitting;Poor (constant support)  Sitting - Dynamic: Poor (constant support)  Standing: With support; Impaired  Standing - Static: Poor  Standing - Dynamic : Poor  Transfer Training  Transfer Training  Sit to Stand: Moderate assistance  Stand to Sit: Maximum assistance  Stand Pivot Transfers: Maximum assistance  Interventions: Safety awareness training;Verbal cues; Tactile cues;Manual cues  Sit to Stand: Moderate assistance  Gait Training  Therapeutic Exercise: Co-treat with OT to maximize functional gains with bed mobility, transfers, positioning in w/c and seated balance activities. Pt attempted to more B LE's towards EOB with verbal and tactile cues in preparation for supine>sit. Pt required constant support while seated EOB due to forward and L lateral lean. Pt able to hold head up when cues to do so, but required constant reminders. SPT form EOB>high back w/c with Max A. Pt with much difficulty moving B LE's with turning and appeared fearful. Pummel wedge cushion placed in high-back w/c to minimize sliding forward with back of w/c slight reclined due to forward posture. Seated reaching in forward and lateral directions to promote core control/strenght, balance and promote use of L UE. Pt required several cues to look to L and for safety with reaching forward. Seated B LE TE's rendered to promote strength and endurance for improved functional mobility: LAQ, hip flexion, ball squeezes, resisted hip abd (manual resistance) 2x10 with verbal, visual and tactile cues for proper technique and to stay on task. Pt remained sitting up in w/c in dinning area with chair alarm, family present and NSG Natacha Alexander aware.       Patient/Caregiver Education:   Pt /Caregiver Education on safety and fall prevention, sitting up OOB was provided to reduce risk of falls and maximize functional gains. ASSESSMENT:  Patient continues to benefit from Skilled PT services to improve bed mobility, strength, balance, transfers, gait. Progression toward goals:  [ ]      Improving appropriately and progressing toward goals  [X]      Improving slowly and progressing toward goals  [X]      Not making progress toward goals and plan of care will be adjusted      Treatment session: 49 minutes.   Therapist:   Jahaira Giles PTA,          9/12/2017

## 2017-09-12 NOTE — PROGRESS NOTES
Problem: Communication Impaired (Adult)  Goal: *Acute Goals and Plan of Care (Insert Text)  STGs: To be completed in 2-3 weeks  Pt will:  1. Complete visual-spatial tasks related to functional ADLs for safe, social reintegration with 70% acc, mod visual/verbal cues  2. Recall > 7 items per concrete category with mod visual/verbal cues in 4/5 trials  3. Perform basic ADL/IADL problem solving tasks with 70% acc, with mod visual/verbal cues  4. Recall 3-5 words after delay/distraction increase recall abilities as related to community reintegration with mod visual/verbal cues in 3/5 trials   5. (NEW GOAL 9/11/17) Tolerate mech-soft, thin liquid trials without overt s/sx aspiration/distress in 4/5 trials with min visual/verbal/tactile cues  6. Utilize compensatory swallow maneuvers (decrease bolus size, decrease rate of intake, cyclical ingestion, etc.) to increase swallow function/safety with min visual, verbal and/or tactile cues in 4/5 trials. 7. Perform oral-motor/laryngeal strengthening exercises to increase swallow strength to decrease aspiration risk, min A  8. Complete an objective measure of swallow fxn (i.e., MBSS) to assess oropharyngeal anatomy/physiology for determination of safest least-restrictive diet and/or appropriate rehabilitation techniques. LTG: To be completed in 4 weeks  Pt will:  1. Complete visual spatial and abstract reasoning tasks for safe completion of ADLs for return home and community reintegration with 80% acc, min A  2. Complete working memory tasks for safe effective completion of IADLs upon return home alone, with 80% acc, min A on with min-mod visual/verbal cues in 3/5 trials   3. Patient will tolerate mech-soft/thin liquid diet with 0 clinical s/sx aspiration, min A, for meeting nutritional needs and quality of life         SPEECH LANGUAGE PATHOLOGY   DAILY TREATMENT NOTE        Patient:  Juan Espana (89 y.o. female)                                Date: 9/12/2017       Primary Diagnosis: HTN   Treatment Diagnosis  Treatment Diagnosis: oropharyngeal dysphagia   Treatment Diagnosis 2: cognitive-linguistic deficit  Physician: Mey Robles MD  Precautions: Fall (blind L eye, monitor ortho BPs)  Mental Status:  Mental Status  Neurologic State: Alert  Orientation Level: Oriented to person, Oriented to place  Cognition: Follows commands  Safety/Judgement: Decreased insight into deficits      OBJECTIVE DATA SUMMARY:   Treatment & Interventions:  Visuospatial/L scanning exercises      Reading Comprehension  Scanning/Tracking : Impaired (%)  Words : Yes     Neuro-Linguistics: Thought Organization: 40        P. O. Trials:  Patient Position: HOB 60  Vocal quality prior to P.O.:  No impairment  Consistency Presented: Nectar thick liquid, Mechanical soft  How Presented: SLP-fed/presented, Self-fed/presented, Straw     Bolus Acceptance: No impairment  Bolus Formation/Control: Impaired  Type of Impairment: Delayed, Mastication, Premature spillage  Propulsion: Discoordination, Delayed (# of seconds)  Oral Residue: Less than 10% of bolus, Lingual  Initiation of Swallow: Delayed (# of seconds)  Laryngeal Elevation: Functional  Aspiration Signs/Symptoms: None  Pharyngeal Phase Characteristics: Poor endurance  Effective Modifications: Small sips and bites  Cues for Modifications: Moderate     Oral Phase Severity: Moderate  Pharyngeal Phase Severity : Moderate         ASSESSMENT: Follow up this am after results of MBS attained. Repeated SILENT aspiration with thin liquids (trace amounts). Pt able to recall a \"test\"; but was unable to recall MBS specifics and/or results. Re-educated pt on need for thickened liquids to decreased aspiration PNA risk; requires reinforcement. Pt able to take nectar-thick and cracker therapeutic snack with mod cues for increased rotary mastication and double swallows; poor carryover as pt required redirection. Grandson in room for cognitive therapy today.  Educated grandson on importance of providing visual stimuli to L side (ie chair, gifts, etc) to promote L scanning. Hand-out provided on said education. Max cues for matching word on L side to matching shape on right side. Max cues for letter ID. SLP utilized visual cues (RED LINE ON LEFT SIDE OF PAPER) for improvement of scanning. Pt with recall of red line in ~25% of tasks today. Jaylen Rosa continues to impede progress with L scanning tasks. SLP will continue to follow as indicated. Progression toward goals:  [ ]         Improving appropriately and progressing toward goals  [X]         Improving slowly and progressing toward goals  [ ]         Not making progress toward goals and plan of care will be adjusted       PLAN:  Recommendations and Planned Interventions:  Patient continues to benefit from skilled intervention to address the above impairments. Continue treatment per established plan of care.   Discharge Recommendations:  Quentin Diane and 24-hour care       COMMUNICATION/EDUCATION:     [X]    Aspiration precautions; compensatory swallow techniques  [X]   Safety precautions  [X]   Compensatory strategies for L scanning        Treatment Time:  55  Minutes     Therapist:    Bettye Stanley, SLP        9/12/2017

## 2017-09-12 NOTE — PROGRESS NOTES
[facility]: [phone] ([phone])  [facility]: [phone] [address]: [phone]    Patient Name: Zabrina Arias  YOB: 1925    Date of Initial Consult: 9-7-17  Reason for Consult: Care Decisions  Requesting Provider: Jasmyn Elizondo MD   Primary Care Physician: Janice Brito., DO      SUMMARY:   Zabrina Arias is a 80y.o. year old with a past history of HTN, HLD, GERD, Dementia, who was admitted on 9/6/2017 from ER/Home with a diagnosis of facial droop/AMS. Current medical issues leading to Palliative Medicine involvement include: patient found to have a CVA, asked to support patient/family to establish goals of care. PALLIATIVE DIAGNOSES:   1. AMS  2. CVA  3. Dementia  4. Debility  5. CKD stage 3       PLAN:   1. I met with patient she is alert, son reading to her, grandson just arrived, she was complaining of a headache, denies it is worse when area on right temple is touched, does not believe it is coming from tooth, no pain with chewing or touching along the TMJ area. .    2. AMS-son said she is requesting a crab cake, he intends to get this for her, believing that if she gets what she wants her intake will improve. 3. CVA-patient aware she suffered a CVA, son understands that she could have future events in the future. Hard to know if her headache is related to the CVA, suggested we use small dose of ROXANOL 2.6mg Q 4hr prn. When further questioned about rating pain, etc she said it was gone, that she no longer had any pain. RN will monitor. 4. Dementia-patient has needed most of her help with  IADLS-was not having any behaviors at home that were cause for alarm, no danger issues. Son has been her sole caregiver for 7 yrs, we had lengthy discussion about the burdens of caregiving. 5. Debility-she had been continent of urine and stool, prior to the CVA, had walked with a cane only when outside the home.  Fed self, had no swallowing issues, in fact loved to eat out lunch. Stopped showers, only did sink bath for 13 yrs and did dress self. She liked to care for her 2 cats, did like to be outside, watering the plants, would play cards and watch TV. 6. CKD Stage 3-patient has IV and today's BMP is actually back to her baseline. Son said she has not eaten nor drank enough since yesterday, he is going to offer her food she is asking for and is in agreement with a TRIAL OF RITALIN, dose of 2.5mg to be given prior to breakfast.   7. Goals of Care and Code Status-DNR/DNI entered into EMR. Son on board with trial of ROXANOL 2.5mg for headache and with Ritalin 2.5mg in am for increased oral intake and engagement with PT/OT. Shared with Dr Alfonzo Burdick. 8. Initial consult note routed to primary continuity provider  9.  Communicated plan of care with: Palliative IDT, RN, PCP       GOALS OF CARE:     [====Goals of Care====]  GOALS OF CARE:  Patient / health care proxy stated goals: to improve and consider return home, vs SAL/NF      TREATMENT PREFERENCES:   Code Status: DNR    Advance Care Planning:  Advance Care Planning 9/6/2017   Patient's Healthcare Decision Maker is: Legal Next of Richard Oliver   Primary Decision Maker Name Marybeth Alonso   Primary Decision Maker Phone Number [phone]   Primary Decision Maker Relationship to Patient -   Confirm Advance Directive None   Patient Would Like to Complete Advance Directive No   Does the patient have other document types (No Data)       Other:    The palliative care team has discussed with patient / health care proxy about goals of care / treatment preferences for patient.  [====Goals of Care====]    Advance Care Planning 9/6/2017   Patient's Healthcare Decision Maker is: Legal Next of Richard Oliver   Primary Decision Maker Name Marybeth Alonso   Primary Decision Maker Phone Number [phone]   Primary Decision Maker Relationship to Patient -   Confirm Advance Directive None   Patient Would Like to Complete Advance Directive No   Does the patient have other document types (No Data)      Cliff Luong-Phone number: [phone]     HISTORY:     History obtained from: Chart    CHIEF COMPLAINT: AMS/Facial Droop    HPI/SUBJECTIVE:  97.7, 87, 108/68, 17 on RA qt 95%, Wt 157lb, Stooled 9-10  MAR-ASA, Lipitor, Plavix, Lovenox, HCTZ, Lisinopril  LABS-WBC 7.2, H&H 11.6 & 34.6, Plat 242, INR 1.2, HgbA1c 6, Albumin 3, Bun/Creat 27/1.31->57/3.4->51/2.13->29/1.22, TSH 2.23,   ECHO-Left ventricle: Systolic function was normal. Ejection fraction was estimated   in the range of 55 % to 60 %. There were  no regional wall motion abnormalities. The study was not technically   sufficient to allow evaluation of LV diastolic  function. Right ventricle: Systolic function was normal.    Atrial septum: Techinically difficult bubble study. There was no obvious   evidence of intracardiac shunt by  peripherally-administered agitated saline contrast.    Aortic valve: There was no stenosis. Aorta, systemic arteries: There was mild dilatation of the ascending aorta. The ascending aortic AP dimension was 40  mm. CT head-Acute/subacute right parietal occipital lobe infarct     2. Chronic infarcts involving the right parietal occipital lobe and bilateral  basal ganglia  MRA Brain-. Occlusion proximal right PCA correlating with right PCA territory infarct. Mild adjacent mass effect is seen.     2. Additional multifocal areas of anterior and posterior circulation stenosis  including moderate to severe proximal left PCA, severe left SHANE, and distal  right M1 segment.     3. No intracranial aneurysm is seen  9-5 Bladder scan pre-void: 189ml, post void 98ml  SLP-Dysphagia Dental soft, thin liquids  Neuro-Right PCA Territory Stroke  Already has right parieto-occipital lobe encephalomalacia. New stroke in Right PCA territory. Given already significant atrophy of brain and encephalomalacia I do not think  Cerebral edema will pose a problem.    Was on on aspirin before. Will attempt MRA brain w/o contrast to look for intracranial stenosis. however the entire right PCA is looks completely infarcted so likely will not significantly change. Will get carotid U/S, and echo-unrevealing. Aspirin 325 mg daily Lipitor 80 mg. - take  mg for 90 days along with 75 mg Plavix and after 90 days stop Plavix and continue with ASA  BP already well controlled. Continue BP control.     The patient is:   [] Verbal and participatory  [x] Non-participatory due to: Not fully oriented today     Clinical Pain Assessment (nonverbal scale for nonverbal patients): Pain: 0denies         FUNCTIONAL ASSESSMENT:     Palliative Performance Scale (PPS):50%          PSYCHOSOCIAL/SPIRITUAL SCREENING:     Advance Care Planning:  Advance Care Planning 9/6/2017   Patient's Healthcare Decision Maker is: Legal Next of Richard 69   Primary Decision Maker Name Oxana Chopra   Primary Decision Maker Phone Number [phone]   Primary Decision Maker Relationship to Patient -   Confirm Advance Directive None   Patient Would Like to Complete Advance Directive No   Does the patient have other document types (No Data)        Any spiritual / Adventism concerns:  [] Yes /  [x] No    Caregiver Burnout:  [] Yes /  [x] No /  [] No Caregiver Present      Anticipatory grief assessment:   [x] Normal  / [] Maladaptive       ESAS Anxiety: Anxiety: 0    ESAS Depression: Depression: 0        REVIEW OF SYSTEMS:     Positive and pertinent negative findings in ROS are noted above in HPI. Having some headache, mostly to right temple area, no jaw pain, did not get relief with tylenol, when asked to rate pain, then said it was gone. Poor oral intake. The following systems were [] reviewed / [x] unable to be reviewed as noted in HPI  Other findings are noted below. Systems: constitutional, ears/nose/mouth/throat, respiratory, gastrointestinal, genitourinary, musculoskeletal, integumentary, neurologic, psychiatric, endocrine. Positive findings noted below. Modified ESAS Completed by: provider   Fatigue: 3 Drowsiness: 0   Depression: 0 Pain: 0   Anxiety: 0 Nausea: 0   Anorexia: 6 Dyspnea: 0     Constipation: No     Stool Occurrence(s): 1        PHYSICAL EXAM:     Wt Readings from Last 3 Encounters:   09/06/17 71.2 kg (157 lb)   09/03/17 73.5 kg (162 lb)   07/26/17 72.7 kg (160 lb 3.2 oz)     Blood pressure 115/66, pulse 83, temperature 98.7 °F (37.1 °C), resp. rate 18, height 5' 4\" (1.626 m), weight 71.2 kg (157 lb), SpO2 95 %, not currently breastfeeding. Pain:  Pain Scale 1: Numeric (0 - 10)  Pain Intensity 1: 4  Pain Onset 1: now  Pain Location 1: Head  Pain Orientation 1: Other (comment)  Pain Description 1: Aching  Pain Intervention(s) 1: Medication (see MAR)  Last bowel movement: 9-10    Constitutional: was alert and oriented to self, location, dx. Right temple area without tender or hard palpable Temp Artery. Jaw movements did not elicit pain. No distress-breathing unlabored     HISTORY:     Active Problems:    Debility (9/7/2017)      Altered mental status (9/7/2017)      CKD (chronic kidney disease) stage 3, GFR 30-59 ml/min (9/7/2017)      Past Medical History:   Diagnosis Date    Advance directive in chart 7/25/2016    Gastrointestinal disorder     GERD (gastroesophageal reflux disease)     GERD (gastroesophageal reflux disease) 11/8/2012    HTN (hypertension) 11/8/2012    Hypercholesterolemia     Hypertension     Pure hypercholesterolemia 11/8/2012      Past Surgical History:   Procedure Laterality Date    HX GYN      HX ORTHOPAEDIC        Family History   Problem Relation Age of Onset    Cancer Father      History reviewed, no pertinent family history.   Social History   Substance Use Topics    Smoking status: Never Smoker    Smokeless tobacco: Never Used    Alcohol use No     Allergies   Allergen Reactions    Pcn [Penicillins] Unable to Obtain      Current Facility-Administered Medications   Medication Dose Route Frequency    dextrose 5 % - 0.45% NaCl infusion  100 mL/hr IntraVENous CONTINUOUS    [facility] TCC ANESTHESIA   Other PRN    [facility] TCC EMERGENCY/STAT BOX   Other PRN    acetaminophen (TYLENOL) tablet 650 mg  650 mg Oral Q4H PRN    aspirin (ASPIRIN) tablet 325 mg  325 mg Oral DAILY    atorvastatin (LIPITOR) tablet 80 mg  80 mg Oral QHS    clopidogrel (PLAVIX) tablet 75 mg  75 mg Oral DAILY    enoxaparin (LOVENOX) injection 30 mg  30 mg SubCUTAneous Q24H    glucose chewable tablet 16 g  4 Tab Oral PRN    glucagon (GLUCAGEN) injection 1 mg  1 mg IntraMUSCular PRN    dextrose (D50W) injection syrg 12.5-25 g  25-50 mL IntraVENous PRN        LAB AND IMAGING FINDINGS:     Lab Results   Component Value Date/Time    WBC 11.6 09/08/2017 08:05 AM    HGB 13.0 09/08/2017 08:05 AM    PLATELET 045 71/30/8851 08:05 AM     Lab Results   Component Value Date/Time    Sodium 137 09/12/2017 07:00 AM    Potassium 3.9 09/12/2017 07:00 AM    Chloride 106 09/12/2017 07:00 AM    CO2 23 09/12/2017 07:00 AM    BUN 29 09/12/2017 07:00 AM    Creatinine 1.22 09/12/2017 07:00 AM    Calcium 8.0 09/12/2017 07:00 AM      Lab Results   Component Value Date/Time    AST (SGOT) 18 09/03/2017 11:20 AM    Alk.  phosphatase 79 09/03/2017 11:20 AM    Protein, total 6.8 09/03/2017 11:20 AM    Albumin 3.0 09/03/2017 11:20 AM    Globulin 3.8 09/03/2017 11:20 AM     Lab Results   Component Value Date/Time    INR 1.2 09/03/2017 11:20 AM    Prothrombin time 14.7 09/03/2017 11:20 AM    aPTT 21.5 06/12/2013 11:00 AM      No results found for: IRON, FE, TIBC, IBCT, PSAT, FERR   No results found for: PH, PCO2, PO2  No components found for: [place]   Lab Results   Component Value Date/Time    CK 89 09/03/2017 11:20 AM    CK - MB <1.0 09/03/2017 11:20 AM              Total time: 40  Counseling / coordination time, spent as noted above: 30  > 50% counseling / coordination?: yes with patient, son and grandson Jacinto  Prolonged service was provided for  []30 min   []75 min in face to face time in the presence of the patient, spent as noted above. Time Start:   Time End:   Note: this can only be billed with 57772 (initial) or 47972 (follow up). If multiple start / stop times, list each separately.

## 2017-09-13 NOTE — PROGRESS NOTES
Problem: Mobility Impaired (Adult and Pediatric)  Goal: *Acute Goals and Plan of Care (Insert Text)  PHYSICAL THERAPY STG GOALS :  Initiated 9/7/2017 and to be accomplished within 2 Weeks (Updated 9/13/17)     1. Patient will move from supine to sit and sit to supine , scoot up and down and roll side to side in bed with minimal assistance/contact guard assist. (Maintaining at Max A)   2. Patient will transfer from bed to chair and chair to bed with minimal assistance/contact guard assist using RW. (Maintaining at Total A X2)   3. Patient will perform sit to stand with minimal assistance/contact guard assist with Fair balance and safety awareness. (Maintaining at Total A x2)   4. Patient will ambulate with moderate assistance for 25 feet with RW on level surfaces with 2 turns. (NT, pt unable at this time)     PHYSICAL THERAPY LTG GOALS :  Initiated 9/7/2017 and to be accomplished within 6 Weeks    1. Patient will move from supine to sit and sit to supine , scoot up and down and roll side to side in bed with supervision/set-up. 2. Patient will transfer from bed to chair and chair to bed with supervision/set-up using RW. 3. Patient will perform sit to stand with RW supervision/set-up with Good balance and safety awareness. 4. Patient will ambulate with supervision/set-up for 100 feet with RW on level surfaces and be able to maneuver through narrow spaces and obstacles without loss of balance. 5. Patient will ascend/descend TBA. Zhane Jernigan Physical Therapist: Elizabeth Altamirano PT on 9/7/2017    [facility]   PHYSICAL THERAPY WEEKLY PROGRESS REPORT  Reporting Period:  Date: 9/7/17  to 9/13/17        Patient:  Mounika Guerra (89 y.o. female)                         Date: 9/13/2017    Primary Diagnosis: HTN                Attending Physician: Radha Freeman MD   Treatment Diagnosis  Treatment Diagnosis: oropharyngeal dysphagia  Treatment Diagnosis 2: cognitive-linguistic deficit  Precautions:  Fall (blind L eye, monitor ortho BPs)  Rehab Potential : Guarded:     Skill interventions and education provided with clinical rationale (include individualized treatment techniques and standardized tests):   Skilled Physical Therapy services were provided with TA to promote greater independence with bed mobility and transfers, TE to build strength, endurance and flexibility for improved functional mobility, Neuromuscular reeducation of movement, coordination and balance for reduced risk of falls, Pt unable to participate in gait training at this time due to inability to stand. Using a comparative statement, summarize significant progress toward goals as a result of skilled intervention provided:  Patient has made Poor progress towards their Physical Therapy goals in all areas   Identify remaining functional areas, impairments limiting progress and/or barriers to improvement:  Patient would benefit from continues PT services to address the following functional deficits in bed mobility, transfers, balance, strength and gait. Pt is limited by weakness, confusion, drowsiness, and lack of motivation during therapy sessions.        OBJECTIVE DATA SUMMARY:       INITIAL ASSESSMENT WEEKLY ASSESSMENT   COGNITIVE STATUS COGNITIVE STATUS   Neurologic State: Confused  Orientation Level: Oriented to person  Cognition: Follows commands  Safety/Judgement: Decreased insight into deficits Neurologic State: Confused  Orientation Level: Oriented to person  Cognition: Poor safety awareness  Safety/Judgement: Decreased insight into deficits   PAIN PAIN   Pain Scale 1: Numeric (0 - 10)  Pain Intensity 1: 0  Pain Onset 1: now  Pain Location 1: Head  Pain Orientation 1: Anterior  Pain Description 1: Aching  Pain Intervention(s) 1: Medication (see MAR), Distraction, Repositioned, Rest  Patient Stated Pain Goal: 0  Pain Reassessment 1: Other (comment) (no)  Additional Pain Sites:  (no) Pain Scale 1: Numeric (0 - 10)  Pain Intensity 1: 0  Pain Onset 1: now  Pain Location 1: Head  Pain Orientation 1: Other (comment)  Pain Description 1: Aching  Pain Intervention(s) 1: Medication (see MAR)  Patient Stated Pain Goal: 0  Pain Reassessment 1: Yes  Additional Pain Sites:  (no)   GROSS ASSESSMENT GROSS ASSESSMENT   AROM: Generally decreased, functional  PROM: Within functional limits  Strength: Generally decreased, functional  Coordination: Generally decreased, functional AROM: Generally decreased, functional  PROM: Within functional limits  Strength: Generally decreased, functional  Coordination: Generally decreased, functional   BED MOBILITY BED MOBILITY   Rolling: Contact guard assistance, Minimum assistance  Supine to Sit: Moderate assistance, Additional time, Assist x1  Sit to Supine: Maximum assistance, Additional time, Assist x2  Scooting: Maximum assistance Rolling: Maximum assistance  Supine to Sit: Maximum assistance, Assist x2  Sit to Supine: Maximum assistance, Assist x2  Scooting: Total assistance   GAIT GAIT          (NT; pt. unable to tolerate)  (NT; pt. unable to tolerate)               TRANSFERS TRANSFERS   Sit to Stand:  (witheld)  Stand to Sit: Maximum assistance Sit to Stand: Maximum assistance, Assist x2  Stand to Sit: Maximum assistance, Assist x2   BALANCE BALANCE   Sitting: Impaired  Sitting - Static: Poor (constant support) (pt. leans heavily towards L; c/o dizziness)  Sitting - Dynamic: Poor (constant support)  Standing:  (nt due to pt.  drowsy)  Standing - Static: Poor  Standing - Dynamic : Poor Sitting: Impaired, With support  Sitting - Static: Poor (constant support)  Sitting - Dynamic: Poor (constant support)  Standing: Impaired, With support  Standing - Static: Poor  Standing - Dynamic : None   WHEELCHAIR MOBILITY/MGMT WHEELCHAIR MOBILITY/MGMT         Activity Tolerance:  Poor Activity Tolerance: Poor   Visual/Perceptual   Tracking: Requires cues, head turns, or add eye shifts to track, Unable to track left to  midline  Visual Fields:  (L sided neglect )        Visual/Perceptual   Vision  Tracking: Requires cues, head turns, or add eye shifts to track, Unable to track left to  midline  Visual Fields:  (L sided neglect )         Auditory:   Auditory Impairment: None      Auditory:   Auditory  Auditory Impairment: None             Clinical Decision making:  NT Clinical Decision making:  NT      Treatment:   See treatment note for 9/13/17     Patient's response to treatment rendered:  poor     Patient expected Discharge Location:  [ Shanna Hem  [ ] SAL/ILF  [ Ardia Pack  [X]Other: TBD     Plan: Continue Skilled PT services as established by the Plan of Care for 5-6 times a week. PT and Assistant have had a weekly case conference regarding the above treatment:  [X] Yes     [ ] No        Treatment session:  35 minutes. Therapist: Elliott Trinidad PTA       Date:9/13/2017  Forward to PT for co-signature when completed.

## 2017-09-13 NOTE — PROGRESS NOTES
Nutrition follow up-[facility]/  Plan of care      RECOMMENDATIONS:     1. Mech Soft diet; chopped meats; NTL  2. SF CIB TID- [place] thick  3. Monitor weight, labs and PO intake  4. RD to follow     GOALS:     1. Ongoing: PO intake meets >75% of protein/calorie needs by 9/20  2. Ongoing: Weight Maintenance (+/- 1-2 lb by 9/20)     ASSESSMENT:     Weight status is classified as overweight per BMI of 26.9. However, weight appropriate for age. PO intake is poor and is currently not meeting nutrition needs. Added SF CIB TID for additional calories/protein. Labs noted. Nutrition recommendations listed. RD to follow. Nutrition Risk:  [] High  [x] Moderate []  Low    SUBJECTIVE/OBJECTIVE:      Transferred from  Neuro to [facility] on 9/6/17. Admitted with CVA. S/P MBS on 9/12. Per SLP, patient with moderate oropharyngeal dysphagia and recommends mech soft diet with chopped meats and NTL. Patient with minimal intake of meals. Spoke with grandson at bedside who reported he has been feeding patient today. This morning she had 1 small cup of fruit and 25% of CIB supplement. Patient had a few sips of CIB supplement for lunch and grandson encouraging patient to try lunch meal. IV support is being discontinued per MD. Family does not want a feeding tube per palliative care team. Discussed fluid and energy requirements with grandson. Encouraged adequate intake of meals and supplements to meet nutrition needs. Will monitor.     Information Obtained from:    [x] Chart Review   [x] Patient   [x] Family/Caregiver   [] Nurse/Physician   [x] Interdisciplinary Meeting/Rounds    Diet:Dental Soft diet; chopped meats; NTL  Medications: [x] Reviewed    Allergies: [x] Reviewed   Patient Active Problem List   Diagnosis Code    HTN (hypertension) I10    GERD (gastroesophageal reflux disease) K21.9    Pure hypercholesterolemia E78.00    Dementia F03.90    Advance directive in chart Z78.9    CVA (cerebral vascular accident) ([facility] [address] I63.9    Debility R53.81    Altered mental status R41.82    CKD (chronic kidney disease) stage 3, GFR 30-59 ml/min N18.3     Past Medical History:   Diagnosis Date    Advance directive in chart 7/25/2016    Gastrointestinal disorder     GERD (gastroesophageal reflux disease)     GERD (gastroesophageal reflux disease) 11/8/2012    HTN (hypertension) 11/8/2012    Hypercholesterolemia     Hypertension     Pure hypercholesterolemia 11/8/2012      Labs:    Lab Results   Component Value Date/Time    Sodium 139 09/13/2017 04:32 AM    Potassium 4.5 09/13/2017 04:32 AM    Chloride 105 09/13/2017 04:32 AM    CO2 22 09/13/2017 04:32 AM    Anion gap 12 09/13/2017 04:32 AM    Glucose 93 09/13/2017 04:32 AM    BUN 27 09/13/2017 04:32 AM    Creatinine 1.21 09/13/2017 04:32 AM    Calcium 8.3 09/13/2017 04:32 AM    Albumin 3.0 09/03/2017 11:20 AM     Anthropometrics: BMI (calculated): 26.9  Last 3 Recorded Weights in this Encounter    09/06/17 1630   Weight: 71.2 kg (157 lb)      Ht Readings from Last 1 Encounters:   09/06/17 5' 4\" (1.626 m)     No data found. IBW: 120 lb %IBW: 131%    Nutrition Needs:   Calories: 9135-2939 Kcal   Protein:   70-85 g     [x] No Cultural, Mandaeism or ethnic dietary need identified.     [] Cultural, Mandaeism and ethnic food preferences identified and addressed     Wt Status:  [] Normal (18.6 - 24.9) [] Underweight (<18.5) [x] Overweight (25 - 29.9) [] Mild Obesity (30 - 34.9)  [] Moderate Obesity (35 - 39.9) [] Morbid Obesity (40+)     Nutrition Problems Identified:   [x] Suboptimal PO intake   [] Food Allergies  [x] Difficulty chewing/swallowing/poor dentition  [] Constipation/Diarrhea   [] Nausea/Vomiting   [] None  [] Other:     Plan:   [] Therapeutic Diet  []  Obtained/adjusted food preferences/tolerances and/or snacks options   [x]  Supplements added   [x] Occupational therapy following for feeding techniques  []  HS snack added   [x]  Modify diet texture   []  Modify diet for food allergies []  Assist with menu selection   [x]  Monitor PO intake on meal rounds   [x]  Continue inpatient monitoring and intervention   [x]  Participated in discharge planning/Interdisciplinary rounds/Team meetings   []  Other:     Education Needs:   [] Not appropriate for teaching at this time    [x] Identified and addressed     Nutrition Monitoring and Evaluation:  [x] Continue ongoing monitoring and intervention  [] Other    Daniel Best

## 2017-09-13 NOTE — PROGRESS NOTES
Problem: Self Care Deficits Care Plan (Adult)  Goal: *Acute Goals and Plan of Care (Insert Text)  OCCUPATIONAL THERAPY SHORT TERM GOALS   Initiated 9/7/2017 and to be accomplished within 2 Week(s)    1. Patient will perform Upper body ADLs utilizing hemitechnique with moderate assistance . 2. Patient will perform Lower body ADLs utilizing hemitechnique & adaptive equipment with maximal assistance . 3. Patient will perform toileting task with maximal assistance x 2 person assist with Fair safety to reduce falls risk. 4. Patient will perform functional transfers with Rolling Walker and moderate assistance x 2 person assist.  5. Patient will perform standing static/dynamic balance activities for improved ADL/IADL function with maximal assistance x 2 person assist and Fair balance and safety awarenes. 6. Patient will improve Barthel index scores to a tleast 20/100 to improve functional mobility. 7. Patient will perform self feeding tasks with Mod A utilizining adaptive equipment and compensatory techniques. 8. Patient will increase left sided awareness with min verbal and tactile cues for ADL performance skills and functional transfers. OCCUPATIONAL THERAPY LONG TERM GOALS   Initiated 9/7/2017 and to be accomplished within 4 Week(s)    1. Patient will perform Upper body ADLs with/without adaptive equipment with supervision/set-up. 2. Patient will perform Lower body ADLs with/without adaptive equipment with supervision/set-up . 3. Patient will perform toileting task with supervision/set-up with Good safety to reduce falls risk. 4. Patient will perform functional transfers with Rolling Walker and supervision/set-up and Good balance and safety awareness. 5. Patient will perform standing static/dynamic activity for improved ADL/IADL function with supervision/set-up an and Good balance and safety awareness.   6. Patient will improve Barthel index score to 50/100 to improve independence with mobility. Therapist: Gaetano Pickering 9/7/2017   [facility]  OCCUPATIONAL THERAPY WEEKLY SUMMARY   Reporting period:  from 9/7/17 through 9/13/17         Patient: Lory Booker (31 y.o. 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 Using a comparative statement, summarize significant progress toward goals as a result of skilled intervention provided:  Patient has made Poor progress towards their Occupational Therapy goals in the following areas: Patient has not met any STGS however demonstrating  slow but steady progression towards LTGS. Identify remaining functional areas, impairments limiting progress and/or barriers to improvement:  Patient would benefit from continued skilled Occupational Therapy Services to address the following functional deficits in decreased static and dynamic sitting and standing balance needed for ADL tasks and transfers. Barriers to improvement are L side neglect and vision limiting participation with ADL tasks and transfers.              OBJECTIVE DATA SUMMARY:          INITIAL ASSESSMENT WEEKLY PROGRESS   COGNITIVE STATUS: COGNITIVE STATUS:   Neurologic State: Confused  Orientation Level: Oriented to person  Cognition: Follows commands  Safety/Judgement: Decreased insight into deficits Neurologic State: Confused  Orientation Level: Oriented to person  Cognition: Poor safety awareness  Safety/Judgement: Decreased insight into deficits   PAIN: PAIN:   Pain Scale 1: Numeric (0 - 10)  Pain Intensity 1: 0  Pain Onset 1: now  Pain Location 1: Head  Pain Orientation 1: Anterior  Pain Description 1: Aching  Pain Intervention(s) 1: Medication (see MAR), Distraction, Repositioned, Rest  Patient Stated Pain Goal: 0  Pain Reassessment 1: Other (comment) (no)  Additional Pain Sites:  (no)       Pain Scale 1: Numeric (0 - 10)  Pain Intensity 1: 0  Pain Onset 1: now  Pain Location 1: Head  Pain Orientation 1: Other (comment)  Pain Description 1: Aching  Pain Intervention(s) 1: Medication (see MAR)  Patient Stated Pain Goal: 0  Pain Reassessment 1: Yes  Additional Pain Sites:  (no)   BED MOBILITY BED MOBILITY   Rolling: Contact guard assistance, Minimum assistance  Supine to Sit: Moderate assistance, Additional time, Assist x1  Sit to Supine: Maximum assistance, Additional time, Assist x2  Scooting: Maximum assistance Rolling: Maximum assistance  Supine to Sit: Maximum assistance, Assist x2  Sit to Supine: Maximum assistance, Assist x2  Scooting: Total assistance   ADL SELF CARE ADL SELF CARE     Bathing Assistance: Maximum assistance     Dressing Assistance: Maximum assistance     Bathing Assistance: Maximum assistance     Toileting Assistance: Total assistance(dependent)  Bladder Hygiene: Total assistance (dependent)  Bowel Hygiene: Total assistance (dependent)  Clothing Management: Total assistance (dependent)     Grooming Assistance: Maximum assistance     Dressing Assistance: Total assistance(dependent), Maximum assistance     Feeding Assistance: Moderate assistance, Maximum assistance  Food to Mouth:  Moderate assistance (for scooping)  Drink to Mouth: Maximum assistance  Adaptive Equipment:  (2 handle Provalae drinking cup)   TRANSFERS TRANSFERS   Sit to Stand:  (witheld)  Stand to Sit: Maximum assistance Sit to Stand: Maximum assistance, Assist x2  Stand to Sit: Maximum assistance, Assist x2 BALANCE BALANCE   Sitting: Impaired  Sitting - Static: Poor (constant support) (pt. leans heavily towards L; c/o dizziness)  Sitting - Dynamic: Poor (constant support)  Standing:  (nt due to pt.  drowsy)  Standing - Static: Poor  Standing - Dynamic : Poor Sitting: Impaired, With support  Sitting - Static: Poor (constant support)  Sitting - Dynamic: Poor (constant support)  Standing: Impaired, With support  Standing - Static: Poor  Standing - Dynamic : None         GROSS ASSESSMENT  GROSS ASSESSMENT   AROM: Generally decreased, functional  PROM: Within functional limits  Strength: Generally decreased, functional  Coordination: Generally decreased, functional AROM: Generally decreased, functional  PROM: Within functional limits  Strength: Generally decreased, functional  Coordination: Generally decreased, functional   COORDINATION COORDINATION   Fine Motor Skills-Upper: Right Intact, Left Impaired  Gross Motor Skills-Upper: Right Intact, Left Impaired Fine Motor Skills-Upper: Right Intact, Left Impaired  Gross Motor Skills-Upper: Right Intact, Left Impaired   VISUAL/PERCEPTUAL VISUAL/PERCEPTUAL   Tracking: Requires cues, head turns, or add eye shifts to track, Unable to track left to  midline  Visual Fields:  (L sided neglect )   Tracking: Requires cues, head turns, or add eye shifts to track, Unable to track left to  midline  Visual Fields:  (L sided neglect )     AUDITORY: AUDITORY:   Auditory Impairment: None Auditory Impairment: None         INSTRUMENTAL  ADL'S:    INSTRUMENTAL ADL'S:            THE BARTHEL INDEX  ACTIVITY    SCORE   FEEDING  0=unable  5=needs help cutting,spreading butter,etc., or modified diet  10= independent    5   BATHING  0=dependent  5=independent (or in shower    0   GROOMING  0=needs help  5=independent face/hair/teeth/shaving (implements provided)    0   DRESSING  0=dependent  5=needs help but can do about half unaided  10=independent(including buttons, zips,laces etc.)    0 BOWELS  0=incontinent  5=occasional accident  10=continent    5   BLADDER  0=incontinent, or catheterized and unable to manage alone  5=occasional accident  10=continent    5   TOILET USE  0=dependent  5=needs some help, but can do something alone  10=independent (on and off, dressing, wiping)    0   TRANSFER (BED TO CHAIR AND BACK)  0=unable, no sitting balance  5=major help(one or two people,physical), can sit  10=minor help(verbal or physical)  15=independent    5   MOBILITY (ON LEVEL SURFACES)  0=immobile or <50 yards  5=wheelchair independent,including corners,>50 yards  10=walkes with help of one person (verbal or physical) >50 yards  15=independent(but may use any aid; for example, stick) >50 yards    0   STAIRS  0=unable  5=needs help (verbal, physical, carrying aid)  10=independent    0             TOTAL:                20/100      Treatment:  Co-treated with PT for optimal functional gains with functional transfers needed for ADL tasks. Assisted patient with bed mobility and bed <>w/c transfers in order to assess safety and independence during task. See above for levels of A needed. Patient demonstrated increased fatigue and arousal during mobility today. Patient also demonstrated increased difficulty maintaining sitting balance and head control today. Attempted bed>w/c transfer in order to increase OOB activity as well as independence and performance with transfers. Patient demonstrated increased resistance and physical resistance during transfer limiting safety and independence during task. Assisted patient with repositioning and bed mobility in order to assess independence and performance. Assisted patient with drinking Carnt[place] shake to increase independence during task. Patient able to complete with Min A for holding cup and locating straw. Assisted patient with self feeding during lunch in order to assess independence and performance during routine.  Patient demonstrated decreased head control as well as alertness during task requiring step by step techniques for optimal safety and independence. Patient able to bring food to mouth with SBA with A for scooping ice cream     Patient's response to Treatment rendered:  Patient able to follow commands with Min cueing and tactile cueing for arousal today. Patient expected Discharge Location:  [ ]Private Residence  [X] [facility]  [ [facility]in  [ ]Other:     Plan: Continue OT services as established on the Plan of Care for 5 times a week.      Treatment Minutes: 46  OT and Assistant have had a weekly case conference regarding the above treatment:  [X] Yes     [ ] No    Therapist:   CATHERINE Silva,         Date:9/13/2017      Forward to OT for co-signature when completed

## 2017-09-13 NOTE — PROGRESS NOTES
Problem: Mobility Impaired (Adult and Pediatric)  Goal: *Acute Goals and Plan of Care (Insert Text)  PHYSICAL THERAPY STG GOALS :  Initiated 9/7/2017 and to be accomplished within 2 Weeks    1. Patient will move from supine to sit and sit to supine , scoot up and down and roll side to side in bed with minimal assistance/contact guard assist.   2. Patient will transfer from bed to chair and chair to bed with minimal assistance/contact guard assist using RW. 3. Patient will perform sit to stand with minimal assistance/contact guard assist with Fair balance and safety awareness. 4. Patient will ambulate with moderate assistance for 25 feet with RW on level surfaces with 2 turns. PHYSICAL THERAPY LTG GOALS :  Initiated 9/7/2017 and to be accomplished within 6 Weeks    1. Patient will move from supine to sit and sit to supine , scoot up and down and roll side to side in bed with supervision/set-up. 2. Patient will transfer from bed to chair and chair to bed with supervision/set-up using RW. 3. Patient will perform sit to stand with RW supervision/set-up with Good balance and safety awareness. 4. Patient will ambulate with supervision/set-up for 100 feet with RW on level surfaces and be able to maneuver through narrow spaces and obstacles without loss of balance. 5. Patient will ascend/descend TBA. Haily Leslie Physical Therapist: Jose Manuel Lawrence PT on 9/7/2017    [facility]   PHYSICAL THERAPY DAILY TREATMENT NOTE        Patient:  Donna Bustillo (27 y.o. 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)  Vital Signs  Vital Signs  Pulse (Heart Rate): 84  Heart Rate Source: Monitor  BP: 146/78  MAP (Calculated): 101  BP 1 Method: Automatic  BP 1 Location: Right arm  BP Patient Position: At rest  Cognitive Status:  Mental Status  Neurologic State: Confused  Orientation Level: Oriented to person  Cognition: Poor safety awareness  Pain  Pain Screen  Pain Scale 1: Numeric (0 - 10)  Pain Intensity 1: 0  Patient Stated Pain Goal: 0  Bed Mobility Training  Bed Mobility Training  Rolling: Maximum assistance  Supine to Sit: Maximum assistance;Assist x2  Sit to Supine: Maximum assistance;Assist x2  Scooting: Total assistance  Interventions: Safety awareness training; Tactile cues; Verbal cues; Visual cues;Manual cues  Balance  Sitting: Impaired; With support  Sitting - Static: Poor (constant support)  Sitting - Dynamic: Poor (constant support)  Standing: Impaired; With support  Standing - Static: Poor  Standing - Dynamic : None  Transfer Training  Transfer Training  Sit to Stand: Maximum assistance;Assist x2  Stand to Sit: Maximum assistance;Assist x2  Stand Pivot Transfers: Other (comment) (Unable to complete )  Interventions: Safety awareness training; Tactile cues; Verbal cues;Manual cues  Sit to Stand: Maximum assistance;Assist x2  Gait Training  Therapeutic Exercise: Co-treat with OT to maximize functional gains with bed mobility, seated balance and transfer training. Pt required verbal, tactile and manual cues for use of bed rail to assist with supine>sit, see above for levels of assistance with bed mobility. Pt unable to maintain upright posture with support sitting on EOB. Pt with head held in downward position, Mod sway and forward lean. Pt able to lift head when cues, but unable to maintain for more than a few seconds at a time. Attempted EOB>w/c transfer with Max A x2. Pt with strong posterior lean and pushing backwards upon sit>stand. Pt began to hit therapist arm and stated \"no, no, no\" Therapist assist pt with stand>sit. When asked if pt would like to sit up in w/c, pt stated \"yes\". Attempted transfer again with similar results. Unable to safely complete transfer.  Pt repositioned in bed with call bell at side. Therapist reported to rehab manager. Patient/Caregiver Education:   Pt /Caregiver Education on safety and fall prevention was provided to reduce risk of falls. ASSESSMENT:  Patient continues to benefit from Skilled PT services to improve bed mobility, transfers, strength, balance, gait. Progression toward goals:  [ ]      Improving appropriately and progressing toward goals  [ ]      Improving slowly and progressing toward goals  [X]      Not making progress toward goals and plan of care will be adjusted      Treatment session: 35 minutes.   Therapist:   Rosie Couch PTA,          9/13/2017

## 2017-09-13 NOTE — PROGRESS NOTES
SW completed a staff interview with pt's nurse Darya Bharwdaj LPN due to pt not being able to answer questions due to alertness.

## 2017-09-13 NOTE — PROGRESS NOTES
[facility]: [phone]  [facility]: [phone]   [facility]/[address]: [phone]    Patient Name: Debbie Brandon  YOB: 1925    Date of Initial Consult: 9-7-17  Reason for Consult: Care Decisions  Requesting Provider: Alfonzo Burdick MD   Primary Care Physician: Sonya Carey., DO      SUMMARY:   Debbie Brandon is a 80y.o. year old with a past history of HTN, HLD, GERD, Dementia, who was admitted on 9/6/2017 from ER/Home with a diagnosis of facial droop/AMS. Current medical issues leading to Palliative Medicine involvement include: patient found to have a CVA, asked to support patient/family to establish goals of care. PALLIATIVE DIAGNOSES:   1. AMS  2. CVA  3. Dementia  4. Debility  5. CKD stage 3       PLAN:   1. Josette Larsenlain and I met with patient she is quite sleepy but her breathing and other vitals are normal, she did rouse to voice, does open eyes, answers questions, denies pain but if unstimulated, dozes back off. Sat her up, verbally engaged was able to get her to drink 3 sips of thick juice, take about 10 spoons of Yogurt and the RN provided the 5mg of Ritalin, which was taken with a spoonful of yogurt. 2. AMS-son not present, grandson Antonia Ordonez here, he said she was able to take about 20 oz of fluid yesterday, he verbalized an understanding that she needs encouragement and that we are trying the RITALIN to see if she will be more alert/engaged and therefore eat/drink better. 3. CVA-she remains with some post CVA lethargy, rec'd a 2.5mg dose of Roxanol yesterday with good relief, both patient and grandson, said HA was gone, no recurrence. 4. Dementia-patient has needed most of her help with  IADLS-was not having any behaviors at home that were cause for alarm, no danger issues. Son has been her sole caregiver for 7 yrs, we had lengthy discussion about the burdens of caregiving.    5. Debility-she had been continent of urine and stool, prior to the CVA, had walked with a cane only when outside the home. Fed self, had no swallowing issues, in fact loved to eat out lunch. Stopped showers, only did sink bath for 13 yrs and did dress self. She liked to care for her 2 cats, did like to be outside, watering the plants, would play cards and watch TV. 6. CKD Stage 3-patient had a CHEM this am, appears good 27/1.21, but did get IVF until yesterday. Shared with grandson that w/o the IVF it is imperative she be encouraged to take fluids, that a Chem can be rechecked and if she is doing poorly it would be time to transition to comfort care, that placing an IVF repeatedly would not make sense as it is not a feasible long term solution. He understood and he intends to support his father in her care. 7. Goals of Care and Code Status-DNR/DNI entered into EMR. Did not see son today, but grandson understands the issue of her poor oral intake impacting her renal function, that the use of RITALIN is to get her to be more alert and able to take fluids. 8. Initial consult note routed to primary continuity provider  9.  Communicated plan of care with: Palliative IDT, RN, PCP       GOALS OF CARE:     [====Goals of Care====]  GOALS OF CARE:  Patient / health care proxy stated goals: to improve and consider return home, vs assisted/NF      TREATMENT PREFERENCES:   Code Status: DNR    Advance Care Planning:  Advance Care Planning 9/6/2017   Patient's Healthcare Decision Maker is: Legal Next of Richard Oliver   Primary Decision Maker Name Vishal Pope   Primary Decision Maker Phone Number [phone]   Primary Decision Maker Relationship to Patient -   Confirm Advance Directive None   Patient Would Like to Complete Advance Directive No   Does the patient have other document types (No Data)       Other:    The palliative care team has discussed with patient / health care proxy about goals of care / treatment preferences for patient.  [====Goals of Care====]    Advance Care Planning 9/6/2017   Patient's Healthcare Decision Maker is: Legal Next of Richard Oliver   Primary Decision Maker Name Morro Orozco   Primary Decision Maker Phone Number [phone]   Primary Decision Maker Relationship to Patient -   Confirm Advance Directive None   Patient Would Like to Complete Advance Directive No   Does the patient have other document types (No Data)      Cliff Luong-Phone number: [phone]     HISTORY:     History obtained from: Chart    CHIEF COMPLAINT: AMS/Facial Droop    HPI/SUBJECTIVE:  97.1, 74, 152/77, 17 on RA qt 95%, Wt 157lb, Stooled 9-10  MAR-ASA, Lipitor, Plavix, Lovenox, (HCTZ, Lisinopril) Rtialin, Roxanol 2.5mg 9-12 at 4pm  LABS-WBC 7.2, H&H 11.6 & 34.6, Plat 242, INR 1.2, HgbA1c 6, Albumin 3, Bun/Creat 27/1.31->57/3.4->51/2.13->29/1.22->27/1.21, TSH 2.23,   ECHO-Left ventricle: Systolic function was normal. Ejection fraction was estimated   in the range of 55 % to 60 %. There were  no regional wall motion abnormalities. The study was not technically   sufficient to allow evaluation of LV diastolic  function. Right ventricle: Systolic function was normal.    Atrial septum: Techinically difficult bubble study. There was no obvious   evidence of intracardiac shunt by  peripherally-administered agitated saline contrast.    Aortic valve: There was no stenosis. Aorta, systemic arteries: There was mild dilatation of the ascending aorta. The ascending aortic AP dimension was 40  mm. CT head-Acute/subacute right parietal occipital lobe infarct     2. Chronic infarcts involving the right parietal occipital lobe and bilateral  basal ganglia  MRA Brain-. Occlusion proximal right PCA correlating with right PCA territory infarct. Mild adjacent mass effect is seen.     2. Additional multifocal areas of anterior and posterior circulation stenosis  including moderate to severe proximal left PCA, severe left SHANE, and distal  right M1 segment.     3.  No intracranial aneurysm is seen  9-5 Bladder scan pre-void: 189ml, post void 98ml  SLP-Dysphagia Dental soft, thin liquids  Neuro-Right PCA Territory Stroke  Already has right parieto-occipital lobe encephalomalacia. New stroke in Right PCA territory. Given already significant atrophy of brain and encephalomalacia I do not think  Cerebral edema will pose a problem. Was on on aspirin before. Will attempt MRA brain w/o contrast to look for intracranial stenosis. however the entire right PCA is looks completely infarcted so likely will not significantly change. Will get carotid U/S, and echo-unrevealing. Aspirin 325 mg daily Lipitor 80 mg. - take  mg for 90 days along with 75 mg Plavix and after 90 days stop Plavix and continue with ASA  BP already well controlled. Continue BP control.     The patient is:   [] Verbal and participatory  [x] Non-participatory due to: Not fully oriented today     Clinical Pain Assessment (nonverbal scale for nonverbal patients): Pain: 0denies         FUNCTIONAL ASSESSMENT:     Palliative Performance Scale (PPS):50%          PSYCHOSOCIAL/SPIRITUAL SCREENING:     Advance Care Planning:  Advance Care Planning 9/6/2017   Patient's Healthcare Decision Maker is: Legal Next of Richard 69   Primary Decision Maker Name Marybeth Alonso   Primary Decision Maker Phone Number [phone]   Primary Decision Maker Relationship to Patient -   Confirm Advance Directive None   Patient Would Like to Complete Advance Directive No   Does the patient have other document types (No Data)        Any spiritual / Baptist concerns:  [] Yes /  [x] No    Caregiver Burnout:  [] Yes /  [x] No /  [] No Caregiver Present      Anticipatory grief assessment:   [x] Normal  / [] Maladaptive       ESAS Anxiety: Anxiety: 0    ESAS Depression: Depression: 0        REVIEW OF SYSTEMS:     Positive and pertinent negative findings in ROS are noted above in HPI.  More drowsy today, is rousable, denies pain, appears quite comfortable, did take fluids when offered and swallowed yogurt without trouble. The following systems were [] reviewed / [x] unable to be reviewed as noted in HPI  Other findings are noted below. Systems: constitutional, ears/nose/mouth/throat, respiratory, gastrointestinal, genitourinary, musculoskeletal, integumentary, neurologic, psychiatric, endocrine. Positive findings noted below. Modified ESAS Completed by: provider   Fatigue: 4 Drowsiness: 6   Depression: 0 Pain: 0   Anxiety: 0 Nausea: 0   Anorexia: 6 Dyspnea: 0     Constipation: No     Stool Occurrence(s): 1        PHYSICAL EXAM:     Wt Readings from Last 3 Encounters:   09/06/17 71.2 kg (157 lb)   09/03/17 73.5 kg (162 lb)   07/26/17 72.7 kg (160 lb 3.2 oz)     Blood pressure 140/79, pulse 84, temperature 97.1 °F (36.2 °C), resp. rate 18, height 5' 4\" (1.626 m), weight 71.2 kg (157 lb), SpO2 95 %, not currently breastfeeding. Pain:  Pain Scale 1: Numeric (0 - 10)  Pain Intensity 1: 0  Pain Onset 1: now  Pain Location 1: Head  Pain Orientation 1: Other (comment)  Pain Description 1: Aching  Pain Intervention(s) 1: Medication (see MAR)  Last bowel movement: 9-10    Constitutional:sleepy but rousable does open eyes, follows command-to stick out tongue.  Breathing unlabored     HISTORY:     Active Problems:    Debility (9/7/2017)      Altered mental status (9/7/2017)      CKD (chronic kidney disease) stage 3, GFR 30-59 ml/min (9/7/2017)      Past Medical History:   Diagnosis Date    Advance directive in chart 7/25/2016    Gastrointestinal disorder     GERD (gastroesophageal reflux disease)     GERD (gastroesophageal reflux disease) 11/8/2012    HTN (hypertension) 11/8/2012    Hypercholesterolemia     Hypertension     Pure hypercholesterolemia 11/8/2012      Past Surgical History:   Procedure Laterality Date    HX GYN      HX ORTHOPAEDIC        Family History   Problem Relation Age of Onset    Cancer Father      History reviewed, no pertinent family history. Social History   Substance Use Topics    Smoking status: Never Smoker    Smokeless tobacco: Never Used    Alcohol use No     Allergies   Allergen Reactions    Pcn [Penicillins] Unable to Obtain      Current Facility-Administered Medications   Medication Dose Route Frequency    morphine (ROXANOL) 100 mg/5 mL (20 mg/mL) concentrated solution 2.6 mg  2.6 mg Oral Q4H PRN    [facility] TCC ANESTHESIA   Other PRN    [facility] TCC EMERGENCY/STAT BOX   Other PRN    acetaminophen (TYLENOL) tablet 650 mg  650 mg Oral Q4H PRN    aspirin (ASPIRIN) tablet 325 mg  325 mg Oral DAILY    atorvastatin (LIPITOR) tablet 80 mg  80 mg Oral QHS    clopidogrel (PLAVIX) tablet 75 mg  75 mg Oral DAILY    glucose chewable tablet 16 g  4 Tab Oral PRN    glucagon (GLUCAGEN) injection 1 mg  1 mg IntraMUSCular PRN    dextrose (D50W) injection syrg 12.5-25 g  25-50 mL IntraVENous PRN        LAB AND IMAGING FINDINGS:     Lab Results   Component Value Date/Time    WBC 11.6 09/08/2017 08:05 AM    HGB 13.0 09/08/2017 08:05 AM    PLATELET 466 41/57/8415 08:05 AM     Lab Results   Component Value Date/Time    Sodium 139 09/13/2017 04:32 AM    Potassium 4.5 09/13/2017 04:32 AM    Chloride 105 09/13/2017 04:32 AM    CO2 22 09/13/2017 04:32 AM    BUN 27 09/13/2017 04:32 AM    Creatinine 1.21 09/13/2017 04:32 AM    Calcium 8.3 09/13/2017 04:32 AM      Lab Results   Component Value Date/Time    AST (SGOT) 18 09/03/2017 11:20 AM    Alk.  phosphatase 79 09/03/2017 11:20 AM    Protein, total 6.8 09/03/2017 11:20 AM    Albumin 3.0 09/03/2017 11:20 AM    Globulin 3.8 09/03/2017 11:20 AM     Lab Results   Component Value Date/Time    INR 1.2 09/03/2017 11:20 AM    Prothrombin time 14.7 09/03/2017 11:20 AM    aPTT 21.5 06/12/2013 11:00 AM      No results found for: IRON, FE, TIBC, IBCT, PSAT, FERR   No results found for: PH, PCO2, PO2  No components found for: [place]   Lab Results   Component Value Date/Time    CK 89 09/03/2017 11:20 AM    CK - MB <1.0 09/03/2017 11:20 AM              Total time: 40  Counseling / coordination time, spent as noted above: 30  > 50% counseling / coordination?: yes with patient, son and grandson Pramod Wall  Prolonged service was provided for  []30 min   []75 min in face to face time in the presence of the patient, spent as noted above. Time Start:   Time End:   Note: this can only be billed with 71189 (initial) or 43822 (follow up). If multiple start / stop times, list each separately.

## 2017-09-13 NOTE — PROGRESS NOTES
made a follow-up visit with Patient. Her grandson Angelica Quiñones was also present and watching over her. Patient said she was not experiencing any pain. She said that she had no concerns at this time. Angelica Breezyjaylen mentioned that her  at [employer], Fr. Mariah Dexter, came and gave her the [address]. Patient confirmed that her  visited with her.  suggested [employer]. Because of swallowing issues,  gave her a tiny piece with thickened water. Angelica Quiñones helped with this. Patient also received a Spiritual Health Kit. Chaplains are to make follow-up visits as needed or requested. The Rev.  [address] [facility],   Horacio Arreola [address]  [facility] [phone] / [facility] [phone]

## 2017-09-13 NOTE — PROGRESS NOTES
Chart reviewed. 5 [facility] and Palliative MD Dr Willem Mishra followed up with Ms Minnie Cole who appeared to be quite sleepy, difficult to awaken. Her nurses were present in room, trying to wake Ms Minnie Cole up enough for her to eat and take her medications. Ms Montero's grandson Corin Bunch was present in waiting area outside of her room.  offered him listening support as he shared a little about his dad who has been caring for his grandmother for 7 years.  also offered contact information to him. No other needs or concerns were shared at this time. Chaplains will continue to follow up for support as needed. Corin Bunch expressed his gratitude.     Felix Levin, Palliative

## 2017-09-13 NOTE — PROGRESS NOTES
Problem: Communication Impaired (Adult)  Goal: *Acute Goals and Plan of Care (Insert Text)  STGs: To be completed in 2-3 weeks  Pt will:  1. Complete visual-spatial tasks related to functional ADLs for safe, social reintegration with 70% acc, mod visual/verbal cues  2. Recall > 7 items per concrete category with mod visual/verbal cues in 4/5 trials  3. Perform basic ADL/IADL problem solving tasks with 70% acc, with mod visual/verbal cues  4. Recall 3-5 words after delay/distraction increase recall abilities as related to community reintegration with mod visual/verbal cues in 3/5 trials   5. (NEW GOAL 9/11/17) Tolerate mech-soft, thin liquid trials without overt s/sx aspiration/distress in 4/5 trials with min visual/verbal/tactile cues  6. Utilize compensatory swallow maneuvers (decrease bolus size, decrease rate of intake, cyclical ingestion, etc.) to increase swallow function/safety with min visual, verbal and/or tactile cues in 4/5 trials. 7. Perform oral-motor/laryngeal strengthening exercises to increase swallow strength to decrease aspiration risk, min A  8. Complete an objective measure of swallow fxn (i.e., MBSS) to assess oropharyngeal anatomy/physiology for determination of safest least-restrictive diet and/or appropriate rehabilitation techniques. LTG: To be completed in 4 weeks  Pt will:  1. Complete visual spatial and abstract reasoning tasks for safe completion of ADLs for return home and community reintegration with 80% acc, min A  2. Complete working memory tasks for safe effective completion of IADLs upon return home alone, with 80% acc, min A on with min-mod visual/verbal cues in 3/5 trials   3. Patient will tolerate mech-soft/thin liquid diet with 0 clinical s/sx aspiration, min A, for meeting nutritional needs and quality of life         SPEECH LANGUAGE PATHOLOGY   DAILY TREATMENT NOTE     Patient:  Lisa Koehler (89 y.o. female)            Date: 9/13/2017  Diagnosis: HTN <principal problem not specified>     Precautions: aspiration Fall (blind L eye, monitor ortho BPs)      SUBJECTIVE:   Patient stated I want more ice cream. OBJECTIVE:      Cognitive and Communication Status:  Neurologic State: Confused  Orientation Level: Oriented to person  Cognition: Poor safety awareness        Safety/Judgement: Decreased insight into deficits     P.O. Trials:  Patient Position: HOB 60  Vocal quality prior to P.O.:  Low volume  Consistency Presented: Nectar thick liquid, Mechanical soft, Pudding, Puree  How Presented: SLP-fed/presented, Straw, Spoon  How Much: 20  Bolus Acceptance: Impaired  Bolus Formation/Control: Impaired  Type of Impairment: Delayed, Mastication  Propulsion: Delayed (# of seconds), Discoordination  Oral Residue: Left, Pocketing, Lingual, 10-50% of bolus  Initiation of Swallow: Delayed (# of seconds)  Laryngeal Elevation: Decreased  Aspiration Signs/Symptoms: Strong cough  Pharyngeal Phase Characteristics: Altered vocal quality, Poor endurance, Suspected pharyngeal residue  Effective Modifications: None  Cues for Modifications: Maximal     Oral Phase Severity: Moderate-severe  Pharyngeal Phase Severity : Moderate-severe            ASSESSMENT:  Follow up this afternoon with michelle at b/s feeding pt dietary supplement. Skilled meal assessment with [facility]-soft/chopped, nectar lunch tray. Pt requires max assistance with po intake. This day, pt with only ~20% po intake of meal with strong encouragement. Difficulty accepting bolus; poor oral bolus prep requiring max verbal cues for increased rotary manipulation and max effortful swallow. Minimal-0 carryover exhibited with dysphagia tx. Lastly it should be noted that pt with poor head control during treatment with varying directions of head movements. Attempted left visual spatial tasks with re: locating utensils and foods on L side; minimal-0 response this day. Poor eye contact and eye opening this day.        Progression toward goals:  [ ] Improving appropriately and progressing toward goals  [ ]         Improving slowly and progressing toward goals  [X]        Not making progress toward goals and plan of care will be adjusted       PLAN:  Recommendations and Planned Interventions:  Recommend diet change to puree/nectar-thick liquid diet  Discharge Recommendations:  Quentin Diane and 24 hour care          COMMUNICATION/EDUCATION:   [X]              Aspiration precautions; compensatory swallow techniques        Patient's response to Treatment rendered:  Poor     Treatment Time:  53 Minutes     Therapist:  Booker Rogers SLP        9/13/2017

## 2017-09-13 NOTE — PROGRESS NOTES
GIM     Patient: Brenton Felix MRN: [id number]  CSN: [id number]    YOB: 1925  Age: 80 y.o. Sex: female    DOA: 9/6/2017 LOS:  LOS: 7 days                    Subjective:     Pt neurologically still severly impaired. Dysphagia& followed by pallitive. Discussed with ronny. Will d/c iv support    Objective:      Visit Vitals    /78 (BP 1 Location: Right arm, BP Patient Position: At rest)    Pulse 84    Temp 97.1 °F (36.2 °C)    Resp 18    Ht 5' 4\" (1.626 m)    Wt 71.2 kg (157 lb)    SpO2 95%    Breastfeeding No    BMI 26.95 kg/m2       Physical Exam:  Chest clear  Cor rr  abd soft  No edema  L hemiparesis peraiate and R gaze preference.     Intake and Output:  Current Shift:     Last three shifts:       Recent Results (from the past 24 hour(s))   METABOLIC PANEL, BASIC    Collection Time: 09/13/17  4:32 AM   Result Value Ref Range    Sodium 139 136 - 145 mmol/L    Potassium 4.5 3.5 - 5.5 mmol/L    Chloride 105 100 - 108 mmol/L    CO2 22 21 - 32 mmol/L    Anion gap 12 3.0 - 18 mmol/L    Glucose 93 74 - 99 mg/dL    BUN 27 (H) 7.0 - 18 MG/DL    Creatinine 1.21 0.6 - 1.3 MG/DL    BUN/Creatinine ratio 22 (H) 12 - 20      GFR est AA 50 (L) >60 ml/min/1.73m2    GFR est non-AA 42 (L) >60 ml/min/1.73m2    Calcium 8.3 (L) 8.5 - 10.1 MG/DL       Current Facility-Administered Medications   Medication Dose Route Frequency    morphine (ROXANOL) 100 mg/5 mL (20 mg/mL) concentrated solution 2.6 mg  2.6 mg Oral Q4H PRN    [facility] TCC ANESTHESIA   Other PRN    [facility] T[facility] EMERGENCY/STAT BOX   Other PRN    acetaminophen (TYLENOL) tablet 650 mg  650 mg Oral Q4H PRN    aspirin (ASPIRIN) tablet 325 mg  325 mg Oral DAILY    atorvastatin (LIPITOR) tablet 80 mg  80 mg Oral QHS    clopidogrel (PLAVIX) tablet 75 mg  75 mg Oral DAILY    glucose chewable tablet 16 g  4 Tab Oral PRN    glucagon (GLUCAGEN) injection 1 mg  1 mg IntraMUSCular PRN    dextrose (D50W) injection syrg 12.5-25 g  25-50 mL IntraVENous PRN Lab Results   Component Value Date/Time    Glucose 93 09/13/2017 04:32 AM    Glucose 129 09/12/2017 07:00 AM    Glucose 127 09/11/2017 02:20 PM    Glucose 105 09/10/2017 04:10 AM    Glucose 126 09/09/2017 04:00 AM        Assessment/Plan     Active Problems:    Debility (9/7/2017)      Altered mental status (9/7/2017)      CKD (chronic kidney disease) stage 3, GFR 30-59 ml/min (9/7/2017)        Janice Ponce MD  9/13/2017, 11:40 AM

## 2017-09-13 NOTE — PROGRESS NOTES
JANENE provided pt's grandson with a list of skilled nursing facilities to give to his dad. Pt's son indicated when JANENE was rounding with the MD that a friend suggested he start looking into nursing facilities.

## 2017-09-13 NOTE — ROUTINE PROCESS
Bedside and Verbal shift change report given to OZIEL Begum (oncoming nurse) by HELLEN Morales RN (offgoing nurse). Report included the following information SBAR, Kardex and MAR.

## 2017-09-14 NOTE — ROUTINE PROCESS
Patient remains up in recliner chair in dinning room with grandson.  Patient is fed by staff and family

## 2017-09-14 NOTE — ROUTINE PROCESS
Two person transfer to recliner chair, patient does not assist with transfer,yells out during transfer process. denies pain

## 2017-09-14 NOTE — PROGRESS NOTES
conducted aspiritual care consult with Donnell Marroquin, who is a 80 y.o.,female. The  provided the following Interventions:  Initiated a relationship of care and support. Patient shared some information about herself--she is Latter day, Abelardo Treadwell from [employer] came by yesterday. She has a daughter who is a sister in [place]. Her grandson Sidra Maher was here to support her. According to Sidra Maher her daughter (the [place]) is coming in this weekend to help her out. Offered prayer and assurance of continued prayers on patient's behalf. Plan:  Chaplains will continue to follow and will provide pastoral care on an as needed/requested basis.  recommends bedside caregivers page  on duty if patient shows signs of acute spiritual or emotional distress.       Kevin Mohan Pride   Board Certified   [phone] - Office

## 2017-09-14 NOTE — ROUTINE PROCESS
Bedside and Verbal shift change report given to [address] [place] [address] (oncoming nurse) by Zahra Moe RN (offgoing nurse). Report included the following information SBAR, Kardex and MAR.  24 hour chart check completed, pt visually checked q 1 hour by nursing staff

## 2017-09-14 NOTE — PROGRESS NOTES
Problem: Self Care Deficits Care Plan (Adult)  Goal: *Acute Goals and Plan of Care (Insert Text)  OCCUPATIONAL THERAPY SHORT TERM GOALS   Initiated 9/7/2017 and to be accomplished within 2 Week(s)    1. Patient will perform Upper body ADLs utilizing hemitechnique with moderate assistance . 2. Patient will perform Lower body ADLs utilizing hemitechnique & adaptive equipment with maximal assistance . 3. Patient will perform toileting task with maximal assistance x 2 person assist with Fair safety to reduce falls risk. 4. Patient will perform functional transfers with Rolling Walker and moderate assistance x 2 person assist.  5. Patient will perform standing static/dynamic balance activities for improved ADL/IADL function with maximal assistance x 2 person assist and Fair balance and safety awarenes. 6. Patient will improve Barthel index scores to a tleast 20/100 to improve functional mobility. 7. Patient will perform self feeding tasks with Mod A utilizining adaptive equipment and compensatory techniques. 8. Patient will increase left sided awareness with min verbal and tactile cues for ADL performance skills and functional transfers. OCCUPATIONAL THERAPY LONG TERM GOALS   Initiated 9/7/2017 and to be accomplished within 4 Week(s)    1. Patient will perform Upper body ADLs with/without adaptive equipment with supervision/set-up. 2. Patient will perform Lower body ADLs with/without adaptive equipment with supervision/set-up . 3. Patient will perform toileting task with supervision/set-up with Good safety to reduce falls risk. 4. Patient will perform functional transfers with Rolling Walker and supervision/set-up and Good balance and safety awareness. 5. Patient will perform standing static/dynamic activity for improved ADL/IADL function with supervision/set-up an and Good balance and safety awareness.   6. Patient will improve Barthel index score to 50/100 to improve independence with mobility. Therapist: Melvin Kaur 9/7/2017   [facility]   OCCUPATIONAL THERAPY DAILY TREATMENT NOTE        Patient: Coral Haley (87 y.o. 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 User may not have seen previous data.)  Sit to Supine: Maximum assistance;Assist x2 (Simultaneous filing. User may not have seen previous data.)  Scooting: Total assistance (Simultaneous filing. User may not have seen previous data.)          Balance:  Balance  Sitting: Impaired; With support  Sitting - Static: Prop sitting; Fair (occasional); Poor (constant support); Supported sitting (F- to P+ Simultaneous filing. User may not have seen previous data.)  Sitting - Dynamic: Poor (constant support)  ADL Self Care:     ADL Intervention:              Grooming  Brushing Teeth: Stand-by assistance  Brushing/Combing Hair: Minimum assistance     Feeding  Drink to Mouth: Maximum assistance  Cues: Verbal cues provided; Tactile cues provided;Physical assistance;Visual/perceptual training/retraining                    Therapeutic Activities:  Co-tx with P.T.  To maximize functional potential e.g. Bed mobility, sitting balance, visual perceptual skills, WB technique through affected LUE and UE ROM with good attention to tasks and improved alertness in prep for ADL performance skills and functional mobility. Patient/Caregiver Education:    Pt. Education on hand placement e.g. Hospital bed rails for increased participation in bed mobility and visual perceptual training to increase awareness towards left side utilizing scanning technique.         ASSESSMENT:  Patient continues to demonstrate the need for skilled Occupational Therapy services to improve self-feeding, grooming, bathing, upper body dressing, lower body dressing, toileting and toilet transfer  Progression toward goals:  [ ]      Improving appropriately and progressing toward goals  [X]      Improving slowly and progressing toward goals  [ ]      Not making progress toward goals and plan of care will be adjusted      Treatment session:   35 minutes     Therapist:    Dmitry Ennis,  9/14/2017

## 2017-09-14 NOTE — PROGRESS NOTES
Problem: Mobility Impaired (Adult and Pediatric)  Goal: *Acute Goals and Plan of Care (Insert Text)  PHYSICAL THERAPY STG GOALS :  Initiated 9/7/2017 and to be accomplished within 2 Weeks (Updated 9/13/17)     1. Patient will move from supine to sit and sit to supine , scoot up and down and roll side to side in bed with minimal assistance/contact guard assist. (Maintaining at Max A)   2. Patient will transfer from bed to chair and chair to bed with minimal assistance/contact guard assist using RW. (Maintaining at Total A X2)   3. Patient will perform sit to stand with minimal assistance/contact guard assist with Fair balance and safety awareness. (Maintaining at Total A x2)   4. Patient will ambulate with moderate assistance for 25 feet with RW on level surfaces with 2 turns. (NT, pt unable at this time)     PHYSICAL THERAPY LTG GOALS :  Initiated 9/7/2017 and to be accomplished within 6 Weeks    1. Patient will move from supine to sit and sit to supine , scoot up and down and roll side to side in bed with supervision/set-up. 2. Patient will transfer from bed to chair and chair to bed with supervision/set-up using RW. 3. Patient will perform sit to stand with RW supervision/set-up with Good balance and safety awareness. 4. Patient will ambulate with supervision/set-up for 100 feet with RW on level surfaces and be able to maneuver through narrow spaces and obstacles without loss of balance. 5. Patient will ascend/descend TBA. Ross Gonzalez Physical Therapist: Wilfrido Hogue, PT on 9/7/2017    [facility]   PHYSICAL THERAPY DAILY TREATMENT NOTE        Patient:  Edward Escobar (89 y.o. female)               Date: 9/14/2017    Physician: Maricruz Caballero MD  Primary Diagnosis: HTN          Treatment Diagnosis  Treatment Diagnosis: oropharyngeal dysphagia  Treatment Diagnosis 2: cognitive-linguistic deficit  Precautions: Fall (blind L eye, monitor ortho BPs)  Vital Signs  Cognitive Status:  Mental Status  Neurologic State: Alert;Confused  Orientation Level: Disoriented to person;Disoriented to place  Cognition: Follows commands  Pain  Pain Screen  Pain Scale 1: Numeric (0 - 10)  Pain Intensity 1: 0  Patient Stated Pain Goal: 0  Bed Mobility Training  Bed Mobility Training  Supine to Sit: Maximum assistance;Assist x2  Sit to Supine: Maximum assistance;Assist x2  Scooting: Total assistance  Interventions: Safety awareness training;Verbal cues  Balance  Sitting: Impaired; With support  Sitting - Static: Prop sitting;Poor (constant support); Fair (occasional) (fair (-) )  Sitting - Dynamic: Poor (constant support)  Transfer Training  Gait Training  Therapeutic Exercise: Co-treat with OT to maximize functional gains with bed mobility and seated balance activities. Bed mobility rendered as noted above. Pt was able to move R LE towards EOB when provided with verbal and tactile cues. Pt sat EOB for ~25' with a range from constant support to moments of prop sitting with verbal cues. Pt continues to required cues to keep head up. Pt was more alert today and able to follow verbal cues for reaching forward and verbal/tacile cues to assist with correcting posture. Seated B LE TE's rendered to promote strength and endurance for improved functional mobility: TR, LAQ, hip flexion 2x5 with verbal, visual and intermittent tactile cues. At end of session, pt supine in bed with bed alarm and call bell at side. Pt instructed on use of call bell during session. Patient/Caregiver Education:   Pt /Caregiver Education on safety and fall prevention was provided to reduce risk of falls. ASSESSMENT:  Patient continues to benefit from Skilled PT services to improve bed mobility, transfers, strength, balance.    Progression toward goals:  [ ]      Improving appropriately and progressing toward goals  [X]      Improving slowly and progressing toward goals  [X]      Not making progress toward goals and plan of care will be adjusted Treatment session: 37 minutes.   Therapist:   Niko Rosario PTA,          9/14/2017

## 2017-09-14 NOTE — PROGRESS NOTES
Problem: Communication Impaired (Adult)  Goal: *Acute Goals and Plan of Care (Insert Text)  STGs: To be completed in 2-3 weeks  Pt will:  1. Complete visual-spatial tasks related to functional ADLs for safe, social reintegration with 70% acc, mod visual/verbal cues  2. Recall > 7 items per concrete category with mod visual/verbal cues in 4/5 trials  3. Perform basic ADL/IADL problem solving tasks with 70% acc, with mod visual/verbal cues  4. Recall 3-5 words after delay/distraction increase recall abilities as related to community reintegration with mod visual/verbal cues in 3/5 trials   5. (NEW GOAL 9/11/17) Tolerate mech-soft, thin liquid trials without overt s/sx aspiration/distress in 4/5 trials with min visual/verbal/tactile cues  6. Utilize compensatory swallow maneuvers (decrease bolus size, decrease rate of intake, cyclical ingestion, etc.) to increase swallow function/safety with min visual, verbal and/or tactile cues in 4/5 trials. 7. Perform oral-motor/laryngeal strengthening exercises to increase swallow strength to decrease aspiration risk, min A  8. Complete an objective measure of swallow fxn (i.e., MBSS) to assess oropharyngeal anatomy/physiology for determination of safest least-restrictive diet and/or appropriate rehabilitation techniques. LTG: To be completed in 4 weeks  Pt will:  1. Complete visual spatial and abstract reasoning tasks for safe completion of ADLs for return home and community reintegration with 80% acc, min A  2. Complete working memory tasks for safe effective completion of IADLs upon return home alone, with 80% acc, min A on with min-mod visual/verbal cues in 3/5 trials   3. Patient will tolerate mech-soft/thin liquid diet with 0 clinical s/sx aspiration, min A, for meeting nutritional needs and quality of life         SPEECH LANGUAGE PATHOLOGY   DAILY TREATMENT NOTE      Patient:  Ángel Nevarez (33 y.o. female)            Date: 9/14/2017     Diagnosis: HTN <principal problem not specified>        Precautions: aspiration Fall (blind L eye, monitor ortho BPs)      SUBJECTIVE:   Patient stated I can't see who that is. Re: question about photo in photo album at b/s. OBJECTIVE:       Cognitive and Communication Status:  Neurologic State: Confused  Orientation Level: Oriented to person  Cognition: Poor safety awareness        Safety/Judgement: Decreased insight into deficits     P.O. Trials:  Patient Position: HOB 60  Vocal quality prior to P.O.:  Low volume  Consistency Presented: Nectar thick liquid, Puree  How Presented: SLP-fed/presented, Straw, Spoon  How Much: 20  Bolus Acceptance: impaired  Bolus Formation/Control: Impaired  Type of Impairment: Delayed, Mastication  Propulsion: Delayed (# of seconds), Discoordination  Oral Residue: Minimal  Initiation of Swallow: Delayed (# of seconds)  Laryngeal Elevation: Decreased  Aspiration Signs/Symptoms: Strong cough  Pharyngeal Phase Characteristics: Delayed swallow  Effective Modifications: None  Cues for Modifications: Maximal     Oral Phase Severity: Moderate  Pharyngeal Phase Severity : Moderate          ASSESSMENT:  Follow up this afternoon with skilled therapeutic snack (puree/NTL). Pt consumed 50% with strong encouragement. SLP fed with spoon as pt unable to feed self at this time. Pt more alert to task today. Required mod cues for increased rotary manipulation and max effortful swallow. Minimal-0 carryover exhibited with dysphagia tx. Attempted left visual spatial tasks with re: locating utensils and foods on L side; pt attempted to locate; unsuccessful. Utilized photo album of family vacation for therapy to address visual spatial tasks; max A for answer wh-?'s of info on L side of page.          Progression toward goals:  [ ]         Improving appropriately and progressing toward goals  [x]         Improving slowly and progressing toward goals  [ ]        Not making progress toward goals and plan of care will be adjusted PLAN:  Recommendations and Planned Interventions:  Recommend continue puree/nectar-thick liquid diet  Discharge Recommendations:  [place] and 24 hour care           COMMUNICATION/EDUCATION:   [X]              Aspiration precautions; compensatory swallow techniques          Patient's response to Treatment rendered:  Poor      Treatment Time:  50 Minutes      Therapist:  Kristyn Nazario, SLP        9/14/2017

## 2017-09-14 NOTE — ROUTINE PROCESS
Bedside and Verbal shift change report given to OZIEL King (oncoming nurse) by ALEXANDRIA Jean-Baptiste LPN (offgoing nurse). Report included the following information SBAR, Kardex and MAR. While in room, call bell and prosonal belongings remained in reach. Nursing Rounds completed per facility protocol.

## 2017-09-14 NOTE — ROUTINE PROCESS
Bedside shift change report given to sunita rn (oncoming nurse) by lavelle rn (offgoing nurse). Report included the following information .

## 2017-09-15 NOTE — ROUTINE PROCESS
Bedside and Verbal shift change report given to BRIONNA Jensen (oncoming nurse) by HELLEN Morales RN (offgoing nurse). Report included the following information SBAR, Kardex and MAR.

## 2017-09-15 NOTE — PROGRESS NOTES
Problem: Communication Impaired (Adult)  Goal: *Acute Goals and Plan of Care (Insert Text)  STGs: To be completed in 2-3 weeks  Pt will:  1. Complete visual-spatial tasks related to functional ADLs for safe, social reintegration with 70% acc, mod visual/verbal cues  2. Recall > 7 items per concrete category with mod visual/verbal cues in 4/5 trials  3. Perform basic ADL/IADL problem solving tasks with 70% acc, with mod visual/verbal cues  4. Recall 3-5 words after delay/distraction increase recall abilities as related to community reintegration with mod visual/verbal cues in 3/5 trials   5. (NEW GOAL 9/11/17) Tolerate mech-soft, thin liquid trials without overt s/sx aspiration/distress in 4/5 trials with min visual/verbal/tactile cues  6. Utilize compensatory swallow maneuvers (decrease bolus size, decrease rate of intake, cyclical ingestion, etc.) to increase swallow function/safety with min visual, verbal and/or tactile cues in 4/5 trials. 7. Perform oral-motor/laryngeal strengthening exercises to increase swallow strength to decrease aspiration risk, min A  8. Complete an objective measure of swallow fxn (i.e., MBSS) to assess oropharyngeal anatomy/physiology for determination of safest least-restrictive diet and/or appropriate rehabilitation techniques. LTG: To be completed in 4 weeks  Pt will:  1. Complete visual spatial and abstract reasoning tasks for safe completion of ADLs for return home and community reintegration with 80% acc, min A  2. Complete working memory tasks for safe effective completion of IADLs upon return home alone, with 80% acc, min A on with min-mod visual/verbal cues in 3/5 trials   3. Patient will tolerate mech-soft/thin liquid diet with 0 clinical s/sx aspiration, min A, for meeting nutritional needs and quality of life            [id number] SPEECH-LANGUAGE    DAILY TREATMENT NOTE     Patient:  Ashlyn Zee (80 y.o. female)                                Date: 9/15/2017       Primary Diagnosis: HTN   Treatment Diagnosis  Treatment Diagnosis: oropharyngeal dysphagia (oropharyngeal dysphagia)  Treatment Diagnosis 2: cognitive-linguistic deficit  Physician: Rob Tan MD  Precautions: Fall (blind L eye, monitor ortho BPs)  Mental Status:  Mental Status  Neurologic State: Alert  Orientation Level: Oriented to person  Cognition: Decreased attention/concentration  Safety/Judgement: Decreased insight into deficits      OBJECTIVE DATA SUMMARY:   Treatment & Interventions: Motor Speech:      Single word productions intelligible 90% of the time and one sentence \"When is Migue Rai going to be here? \" 100% intelligible. Language Comprehension and Expression:      Patient able to turn head to left to see actual photograph of body parts and common objects and identify them with 80% accuracy given min verbal cues. Reading Comprehension  Scanning/Tracking : Impaired (%)     Neuro-Linguistics:  See Below:     Patient completed oral motor exercises using a tongue depressor to improve lingual strength. Patient able to perform 10 repetitions x  3 with fair strength following verbal cues. Consistently followed one step directions and was able to respond to simple task related yes/no questions verbally and with head nods. Consumed NT liquids and pureed consistency when fed via 1/2 and 1/4 teaspoon  using safe swallow strategies with out any overt s/s of aspiration. Son present and observing. Received education on the purpose of exercises and positioning at least 60 degrees or greater for any p.o. Intake . Son expressed  understanding and agreement. Per son his mother had responded well and had tolerated treatment well. Continue POC.                                                      Pragmatics:     Voice:        Response & Tolerance to Activities: Good tolerance this day to treatment tasks with son in attendance     Pain:           ASSESSMENT:  Progression toward goals:  [ ] Improving appropriately and progressing toward goals  [X]         Improving slowly and progressing toward goals  [ ]         Not making progress toward goals and plan of care will be adjusted       PLAN:  Recommendations and Planned Interventions:     Patient continues to benefit from skilled intervention to address the above impairments. Continue treatment per established plan of care. Discharge Recommendations:   To Be Determined       COMMUNICATION/EDUCATION:     [X]        Safety precautions  [ ]        Compensatory communication techniques  [ ]        Internal/external memory techniques        Treatment Time: 48 Minutes     Therapist:    FCO Knox        9/15/2017

## 2017-09-15 NOTE — PROGRESS NOTES
Problem: Self Care Deficits Care Plan (Adult)  Goal: *Acute Goals and Plan of Care (Insert Text)  OCCUPATIONAL THERAPY SHORT TERM GOALS   Initiated 9/7/2017 and to be accomplished within 2 Week(s)    1. Patient will perform Upper body ADLs utilizing hemitechnique with moderate assistance . 2. Patient will perform Lower body ADLs utilizing hemitechnique & adaptive equipment with maximal assistance . 3. Patient will perform toileting task with maximal assistance x 2 person assist with Fair safety to reduce falls risk. 4. Patient will perform functional transfers with Rolling Walker and moderate assistance x 2 person assist.  5. Patient will perform standing static/dynamic balance activities for improved ADL/IADL function with maximal assistance x 2 person assist and Fair balance and safety awarenes. 6. Patient will improve Barthel index scores to a tleast 20/100 to improve functional mobility. 7. Patient will perform self feeding tasks with Mod A utilizining adaptive equipment and compensatory techniques. 8. Patient will increase left sided awareness with min verbal and tactile cues for ADL performance skills and functional transfers. OCCUPATIONAL THERAPY LONG TERM GOALS   Initiated 9/7/2017 and to be accomplished within 4 Week(s)    1. Patient will perform Upper body ADLs with/without adaptive equipment with supervision/set-up. 2. Patient will perform Lower body ADLs with/without adaptive equipment with supervision/set-up . 3. Patient will perform toileting task with supervision/set-up with Good safety to reduce falls risk. 4. Patient will perform functional transfers with Rolling Walker and supervision/set-up and Good balance and safety awareness. 5. Patient will perform standing static/dynamic activity for improved ADL/IADL function with supervision/set-up an and Good balance and safety awareness.   6. Patient will improve Barthel index score to 50/100 to improve independence with mobility. Therapist: Zack Newman 9/7/2017   [facility]   OCCUPATIONAL THERAPY DAILY TREATMENT NOTE        Patient: Sobeida Marquez (89 y.o. female)                   Date: 9/15/2017  Attending Physician: James Hall MD  Primary Diagnosis: HTN    Treatment Diagnosis  Treatment Diagnosis: oropharyngeal dysphagia  Treatment Diagnosis 2: cognitive-linguistic deficit   Precautions : Precautions at Admission: Fall (blind L eye, monitor ortho BPs)  Vital Signs:  Vital Signs  Temp: 98.1 °F (36.7 °C)  Temp Source: Oral  Pulse (Heart Rate): 75  Heart Rate Source: Monitor  Resp Rate: 20  O2 Sat (%): 96 %  Level of Consciousness: Alert  BP: 133/53  MAP (Calculated): 80  MEWS Score: 1                 Bed Mobility:  Bed Mobility  Rolling: Moderate assistance;Assist x1  Supine to Sit: Moderate assistance;Assist x1  Sit to Supine: Maximum assistance;Assist x2  Scooting: Moderate assistance           Balance:  Balance  Sitting: Impaired; With support  Sitting - Static: Poor (constant support); Fair (occasional)  Sitting - Dynamic: Poor (constant support)  Standing: Impaired; With support;Pull to stand  Standing - Static: Constant support;Poor  Standing - Dynamic : None  ADL Self Care:     ADL Intervention:              Grooming  Brushing Teeth: Contact guard assistance     Feeding  Utensil Management: Contact guard assistance  Drink to Mouth: Moderate assistance  Cues: Verbal cues provided                 Therapeutic Activities:  Co-tx with P.T. To maximize functional potential e.g. Bed mobility, sitting balance, standing balance and tolerance w/ FWW, propioception w/ weight shifting and core strengthening, visual perceptual skills, WB technique through affected LUE and UE ROM with good attention to tasks and improved alertness in prep for ADL performance skills and functional mobility. Patient noted w/ increased alertness and ability to follow commands w/ good participation.       Patient/Caregiver Education:    Pt. Ciaran Truong Education on hand placement I.e. Hospital bed rails for increased participation w/ bed mobility tasks. Family instruction to continue LUE AROM and visual motor skills to increase attending to affected L side.        ASSESSMENT:  Patient continues to demonstrate the need for skilled Occupational Therapy services to improve   self-feeding, grooming, bathing, upper body dressing, lower body dressing, toileting and toilet transfer  Progression toward goals:  [ ]      Improving appropriately and progressing toward goals  [X]      Improving slowly and progressing toward goals  [ ]      Not making progress toward goals and plan of care will be adjusted      Treatment session:   38 minutes     Therapist:    Amirah Payne,  9/15/2017

## 2017-09-15 NOTE — PROGRESS NOTES
Problem: Mobility Impaired (Adult and Pediatric)  Goal: *Acute Goals and Plan of Care (Insert Text)  PHYSICAL THERAPY STG GOALS :  Initiated 9/7/2017 and to be accomplished within 2 Weeks (Updated 9/13/17)     1. Patient will move from supine to sit and sit to supine , scoot up and down and roll side to side in bed with minimal assistance/contact guard assist. (Maintaining at Max A)   2. Patient will transfer from bed to chair and chair to bed with minimal assistance/contact guard assist using RW. (Maintaining at Total A X2)   3. Patient will perform sit to stand with minimal assistance/contact guard assist with Fair balance and safety awareness. (Maintaining at Total A x2)   4. Patient will ambulate with moderate assistance for 25 feet with RW on level surfaces with 2 turns. (NT, pt unable at this time)     PHYSICAL THERAPY LTG GOALS :  Initiated 9/7/2017 and to be accomplished within 6 Weeks    1. Patient will move from supine to sit and sit to supine , scoot up and down and roll side to side in bed with supervision/set-up. 2. Patient will transfer from bed to chair and chair to bed with supervision/set-up using RW. 3. Patient will perform sit to stand with RW supervision/set-up with Good balance and safety awareness. 4. Patient will ambulate with supervision/set-up for 100 feet with RW on level surfaces and be able to maneuver through narrow spaces and obstacles without loss of balance. 5. Patient will ascend/descend TBA. Jake Weldon Physical Therapist: Carlos Barnett, PT on 9/7/2017    [facility]   PHYSICAL THERAPY DAILY TREATMENT NOTE        Patient:  Coral Haley (89 y.o. female)               Date: 9/15/2017    Physician: Nickolas Lance MD  Primary Diagnosis: HTN          Treatment Diagnosis  Treatment Diagnosis: oropharyngeal dysphagia  Treatment Diagnosis 2: cognitive-linguistic deficit  Precautions: Fall (blind L eye, monitor ortho BPs)  Vital Signs  Vital Signs  Temp: 98.1 °F (36.7 °C)  Temp Source: Oral  Pulse (Heart Rate): 75  Heart Rate Source: Monitor  Resp Rate: 20  O2 Sat (%): 96 %  Level of Consciousness: Alert  BP: 133/53  MAP (Calculated): 80  MEWS Score: 1  Cognitive Status:  Mental Status  Neurologic State: Alert  Orientation Level: Oriented to person  Cognition: Follows commands  Pain  Bed Mobility Training  Bed Mobility Training  Rolling: Moderate assistance;Assist x1  Supine to Sit: Moderate assistance;Assist x1  Sit to Supine: Maximum assistance;Assist x2  Scooting: Moderate assistance  Interventions: Safety awareness training;Verbal cues; Tactile cues;Manual cues  Balance  Sitting: Impaired; With support  Sitting - Static: Poor (constant support); Fair (occasional)  Sitting - Dynamic: Poor (constant support)  Standing: Impaired; With support;Pull to stand  Standing - Static: Constant support;Poor  Standing - Dynamic : None  Transfer Training  Gait Training  Therapeutic Exercise: Co-treat with OT to maximize functional gains with bed mobility and seated balance activities. Pt with noted improvement with supine>sit today with Mod A x1 as pt was able to follow directions to  move B LE's to EOB. Pt sat EOB for ~20' with variation between constant support-prop sitting. Pt continues to have forward lean with unsupported seated balance and required constant cues to look up and sit up straight. Pt participated in seated weight shifting L<>R, scooting at EOB L<>R with verbal tactile and occasional manual cues for technique and to stay on task. Seated B LE TE rendered to promote strength and endurance for improved functional mobility: LAQ, hip flexion 2x5. Core strengthing exercised with posterior lean and correcting to upright sitting with Min A and verbal cues. Sit<>stand with Max A x2 with two trials. Pt was only able to tolerate standing for ~10\" each time. At end of session, pt supine in bed with HOB elevated and call bell at side. Pt's Grandson present.        Patient/Caregiver Education:   Pt /Caregiver Education on safety and fall prevention, posture was provided to reduce risk of falls. ASSESSMENT:  Patient continues to benefit from Skilled PT services to improve bed mobility, transfers, strength, balance, and gait. Progression toward goals:  [ ]      Improving appropriately and progressing toward goals  [X]      Improving slowly and progressing toward goals  [ ]      Not making progress toward goals and plan of care will be adjusted      Treatment session: 38 minutes.   Therapist:   Joselito Gunderson PTA,          9/15/2017

## 2017-09-16 NOTE — ROUTINE PROCESS
Bedside and Verbal shift change report given to FELA Obrien LPN (oncoming nurse) by HELLEN Morales RN (offgoing nurse). Report included the following information SBAR, Kardex and MAR.

## 2017-09-16 NOTE — ROUTINE PROCESS
Bedside and verbal shift report given to HELLEN Pereira RN (oncoming nurse) by FELA Grimaldo LPN (off going nurse). Report included SBAR, MAR and Kardex. Hourly rounds completed.

## 2017-09-17 NOTE — PROGRESS NOTES
Problem: Self Care Deficits Care Plan (Adult)  Goal: *Acute Goals and Plan of Care (Insert Text)  OCCUPATIONAL THERAPY SHORT TERM GOALS   Initiated 9/7/2017 and to be accomplished within 2 Week(s)    1. Patient will perform Upper body ADLs utilizing hemitechnique with moderate assistance . 2. Patient will perform Lower body ADLs utilizing hemitechnique & adaptive equipment with maximal assistance . 3. Patient will perform toileting task with maximal assistance x 2 person assist with Fair safety to reduce falls risk. 4. Patient will perform functional transfers with Rolling Walker and moderate assistance x 2 person assist.  5. Patient will perform standing static/dynamic balance activities for improved ADL/IADL function with maximal assistance x 2 person assist and Fair balance and safety awarenes. 6. Patient will improve Barthel index scores to a tleast 20/100 to improve functional mobility. 7. Patient will perform self feeding tasks with Mod A utilizining adaptive equipment and compensatory techniques. 8. Patient will increase left sided awareness with min verbal and tactile cues for ADL performance skills and functional transfers. OCCUPATIONAL THERAPY LONG TERM GOALS   Initiated 9/7/2017 and to be accomplished within 4 Week(s)    1. Patient will perform Upper body ADLs with/without adaptive equipment with supervision/set-up. 2. Patient will perform Lower body ADLs with/without adaptive equipment with supervision/set-up . 3. Patient will perform toileting task with supervision/set-up with Good safety to reduce falls risk. 4. Patient will perform functional transfers with Rolling Walker and supervision/set-up and Good balance and safety awareness. 5. Patient will perform standing static/dynamic activity for improved ADL/IADL function with supervision/set-up an and Good balance and safety awareness.   6. Patient will improve Barthel index score to 50/100 to improve independence with mobility. Therapist: Mynor Carter 9/7/2017   [facility]   OCCUPATIONAL THERAPY DAILY TREATMENT NOTE        Patient: Bridgette Martino (36 y.o. female)                   Date: 9/17/2017  Attending Physician: Janice Ponce MD  Primary Diagnosis: HTN    Treatment Diagnosis  Treatment Diagnosis: oropharyngeal dysphagia (oropharyngeal dysphagia)  Treatment Diagnosis 2: cognitive-linguistic deficit   Precautions : Precautions at Admission: Fall (blind L eye, monitor ortho BPs)  Vital Signs:        Cognitive Status:  Mental Status  Neurologic State: Alert  Orientation Level: Oriented to person;Oriented to place  Pain:  Pain Screen  Pain Scale 1: Numeric (0 - 10)  Pain Intensity 1: 2  Patient Stated Pain Goal: 0  Pain Scale 1: Numeric (0 - 10)  Gross Assessment:  Gross Assessment  AROM: Generally decreased, functional  Strength: Generally decreased, functional  Coordination: Generally decreased, functional  Coordination:  Coordination  Fine Motor Skills-Upper: Left Impaired;Right Intact  Gross Motor Skills-Upper: Left Impaired;Right Intact  Bed Mobility:  Bed Mobility  Rolling: Minimum assistance; Moderate assistance  Supine to Sit: Moderate assistance; Additional time;Assist x1  Sit to Supine: Moderate assistance;Maximum assistance; Additional time;Assist x1  Scooting: Moderate assistance           Balance:  Balance  Sitting: Impaired; With support  Sitting - Static: Poor (constant support)  Sitting - Dynamic: Poor (constant support)  ADL Self Care:     ADL Intervention:              Grooming  Washing Face: Stand-by assistance  Brushing Teeth: Stand-by assistance     Feeding  Cutting Food:  Total assistance (dependent)  Utensil Management: Stand-by assistance  Food to Mouth: Stand-by assistance  Drink to Mouth: Contact guard assistance  Cues: Verbal cues provided;Visual cues provided  Adaptive Equipment: Built up fork;Built up spoon;Cup with lid and handle (2 HANDLE CUP AND LID REMOVED) Therapeutic Activities: Verbal and tactile cues provided to increase BUE integration in order to increase LUE AROM and awareness. Bed mobility: verbal and tactile cues for bridge technique to scoot up towards head of bed for optimal positioning while participating in ADL routine I.e. Self feeding and grooming tasks. Patient/Caregiver Education:    Pt. & Caregiver Education on adaptive equipment I.e. Red tubey  to place over utensil handles to build up handles for easier grasp and visual cue was provided to increase carryover during self feeding tasks.         ASSESSMENT:  Patient continues to demonstrate the need for skilled Occupational Therapy services to improve   self-feeding, grooming, bathing, upper body dressing, lower body dressing, toileting and toilet transfer  Progression toward goals:  [ ]      Improving appropriately and progressing toward goals  [X]      Improving slowly and progressing toward goals  [ ]      Not making progress toward goals and plan of care will be adjusted      Treatment session:   45 minutes     Therapist:    Yesi Benavides,  9/17/2017

## 2017-09-17 NOTE — PROGRESS NOTES
Problem: Falls - Risk of  Goal: *Absence of Falls  Document Martina Fall Risk and appropriate interventions in the flowsheet.    Outcome: Progressing Towards Goal  Fall Risk Interventions:  Mobility Interventions: Bed/chair exit alarm, Patient to call before getting OOB     Mentation Interventions: Bed/chair exit alarm, Door open when patient unattended     Medication Interventions: Bed/chair exit alarm     Elimination Interventions: Call light in reach, Bed/chair exit alarm     History of Falls Interventions: Bed/chair exit alarm, Door open when patient unattended

## 2017-09-17 NOTE — PROGRESS NOTES
Problem: Mobility Impaired (Adult and Pediatric)  Goal: *Acute Goals and Plan of Care (Insert Text)  PHYSICAL THERAPY STG GOALS :  Initiated 9/7/2017 and to be accomplished within 2 Weeks (Updated 9/13/17)     1. Patient will move from supine to sit and sit to supine , scoot up and down and roll side to side in bed with minimal assistance/contact guard assist. (Maintaining at Max A)   2. Patient will transfer from bed to chair and chair to bed with minimal assistance/contact guard assist using RW. (Maintaining at Total A X2)   3. Patient will perform sit to stand with minimal assistance/contact guard assist with Fair balance and safety awareness. (Maintaining at Total A x2)   4. Patient will ambulate with moderate assistance for 25 feet with RW on level surfaces with 2 turns. (NT, pt unable at this time)     PHYSICAL THERAPY LTG GOALS :  Initiated 9/7/2017 and to be accomplished within 6 Weeks    1. Patient will move from supine to sit and sit to supine , scoot up and down and roll side to side in bed with supervision/set-up. 2. Patient will transfer from bed to chair and chair to bed with supervision/set-up using RW. 3. Patient will perform sit to stand with RW supervision/set-up with Good balance and safety awareness. 4. Patient will ambulate with supervision/set-up for 100 feet with RW on level surfaces and be able to maneuver through narrow spaces and obstacles without loss of balance. 5. Patient will ascend/descend TBA. Clay Zamora Physical Therapist: Karen Newman, PT on 9/7/2017    [facility]   PHYSICAL THERAPY DAILY TREATMENT NOTE        Patient:  Bridgette Martino (17 y.o. female)               Date: 9/17/2017    Physician: Janice Ponce MD  Primary Diagnosis: HTN          Treatment Diagnosis  Treatment Diagnosis: oropharyngeal dysphagia (oropharyngeal dysphagia)  Treatment Diagnosis 2: cognitive-linguistic deficit  Precautions: Fall (blind L eye, monitor ortho BPs)  Vital Signs  Cognitive Status:  Mental Status  Neurologic State: Alert  Orientation Level: Oriented to person;Oriented to place  Pain  Pain Screen  Pain Scale 1: Numeric (0 - 10)  Pain Intensity 1: 2  Patient Stated Pain Goal: 0  Bed Mobility Training  Bed Mobility Training  Rolling: Minimum assistance; Moderate assistance  Supine to Sit: Moderate assistance; Additional time;Assist x1  Sit to Supine: Moderate assistance;Maximum assistance; Additional time;Assist x1  Scooting: Moderate assistance  Interventions: Safety awareness training;Verbal cues  Balance  Sitting: Impaired; With support  Sitting - Static: Poor (constant support)  Sitting - Dynamic: Poor (constant support)  Transfer Training  Gait Training  Therapeutic Exercise:   Pt tolerated treatment session well this am and was able to perform rolling->R with mod A of 1 and supine<->sit t/f mod A/max A of 1 for upper body/trunk and B LE's management on/off bed, requiring cues for proper hand placement/technique. Pt performed static/dynamic sitting at EOB with min A for maintaining balance midline while performing LE exercises AROM BLE's: ankle pumps, LAQ's, and hip flexion marches (AAROM L) 2x15 reps with rest breaks. Pt demonstrates lateral and posterior LOB at EOB with therapist providing verbal/manual cues for midline orientation throughout activity. Patient/Caregiver Education:   Pt /Caregiver Education on benefits of exercise and importance of increase participation in [address] [place] [address] activities to improve overall core stability/strength and functional mobility. Pt demonstrated understanding. ASSESSMENT:  Patient continues to benefit from Skilled PT services to improve ROM, strength, sitting balance, and overall functional (I) with upright activities.       Progression toward goals:  [ ]      Improving appropriately and progressing toward goals  [X]      Improving slowly and progressing toward goals  [ ]      Not making progress toward goals and plan of care will be adjusted Treatment session: 38 minutes.   Therapist:   Bhanu Moore PTA,          9/17/2017

## 2017-09-17 NOTE — ROUTINE PROCESS
Bedside and Verbal shift change report given to Lokesh Valladares RN (oncoming nurse) by Eladio Ramos RN (offgoing nurse). Report included the following information SBAR, Kardex and MAR. Hourly rounds made & 24 hour chart check complete.

## 2017-09-18 NOTE — PROGRESS NOTES
Problem: Self Care Deficits Care Plan (Adult)  Goal: *Acute Goals and Plan of Care (Insert Text)  OCCUPATIONAL THERAPY SHORT TERM GOALS   Initiated 9/7/2017 and to be accomplished within 2 Week(s)    1. Patient will perform Upper body ADLs utilizing hemitechnique with moderate assistance . 2. Patient will perform Lower body ADLs utilizing hemitechnique & adaptive equipment with maximal assistance . 3. Patient will perform toileting task with maximal assistance x 2 person assist with Fair safety to reduce falls risk. 4. Patient will perform functional transfers with Rolling Walker and moderate assistance x 2 person assist.  5. Patient will perform standing static/dynamic balance activities for improved ADL/IADL function with maximal assistance x 2 person assist and Fair balance and safety awarenes. 6. Patient will improve Barthel index scores to a tleast 20/100 to improve functional mobility. 7. Patient will perform self feeding tasks with Mod A utilizining adaptive equipment and compensatory techniques. 8. Patient will increase left sided awareness with min verbal and tactile cues for ADL performance skills and functional transfers. OCCUPATIONAL THERAPY LONG TERM GOALS   Initiated 9/7/2017 and to be accomplished within 4 Week(s)    1. Patient will perform Upper body ADLs with/without adaptive equipment with supervision/set-up. 2. Patient will perform Lower body ADLs with/without adaptive equipment with supervision/set-up . 3. Patient will perform toileting task with supervision/set-up with Good safety to reduce falls risk. 4. Patient will perform functional transfers with Rolling Walker and supervision/set-up and Good balance and safety awareness. 5. Patient will perform standing static/dynamic activity for improved ADL/IADL function with supervision/set-up an and Good balance and safety awareness.   6. Patient will improve Barthel index score to 50/100 to improve independence with mobility. Therapist: Mary Sahni 9/7/2017   [facility]   OCCUPATIONAL THERAPY DAILY TREATMENT NOTE        Patient: Juan Lenz (82 y.o. female)                   Date: 9/18/2017  Attending Physician: Johnny Yusuf MD  Primary Diagnosis: HTN    Treatment Diagnosis  Treatment Diagnosis: oropharyngeal dysphagia (oropharyngeal dysphagia)  Treatment Diagnosis 2: cognitive-linguistic deficit   Precautions : Precautions at Admission: Fall (blind L eye, monitor ortho BPs)  Vital Signs:  Vital Signs  Temp: 97.8 °F (36.6 °C)  Temp Source: Oral  Pulse (Heart Rate): 82  Heart Rate Source: Monitor  Resp Rate: 20  O2 Sat (%): 95 %  Level of Consciousness: Alert  BP: 145/78  MAP (Calculated): 100  MEWS Score: 1     Cognitive Status:  Mental Status  Neurologic State: Alert  Orientation Level: Oriented to person  Cognition: Decreased attention/concentration  Pain:        Gross Assessment:     Coordination:     Bed Mobility:  Bed Mobility  Rolling: Minimum assistance;Contact guard assistance  Supine to Sit: Minimum assistance  Sit to Supine: Moderate assistance  Scooting: Moderate assistance  Transfers:  Functional Transfers  Sit to Stand: Moderate assistance;Assist x2  Stand to Sit: Moderate assistance;Assist x2     Balance:  Balance  Sitting: With support; Impaired  Sitting - Static: Prop sitting  Sitting - Dynamic: Poor (constant support)  Standing: Impaired;Pull to stand; With support  Standing - Static: Constant support;Poor  Standing - Dynamic : Poor  ADL Self Care:     ADL Intervention:           Toileting  Toileting Assistance: Total assistance(dependent)  Clothing Management: Total assistance (dependent)  Grooming  Brushing Teeth: Contact guard assistance (suction style toothbrush )  Lower Body Dressing Assistance  Socks: Minimum assistance  Position Performed: Long sitting on bed                    Therapeutic Activities: Co-tx with P.T.  To maximize functional potential w/ bed mobility, optimal bed positioning & functional activity tolerance to improve ADL performance skills. Co-tx with S.T. To incorporate compensatory techniques w/ adaptive suction toothbrush for improved oral hygien & (I) w/ grooming tasks-family notified of patient to utilize adaptive suction with therapy only at this time for safety secondary to Aspiration risk. Patient/Caregiver Education:    Pt. Christina Carrel Education on use of balloon to increase BUE integration, visual motor skills and increasing LUE strength.         ASSESSMENT:  Patient continues to demonstrate the need for skilled Occupational Therapy services to improve   grooming, bathing, upper body dressing, lower body dressing, toileting and toilet transfer  Progression toward goals:  [X]      Improving appropriately and progressing toward goals  [ ]      Improving slowly and progressing toward goals  [ ]      Not making progress toward goals and plan of care will be adjusted      Treatment session:   51 minutes     Therapist:    Aristeo Barger,  9/18/2017

## 2017-09-18 NOTE — PROGRESS NOTES
[facility]: [phone] ([phone])  [facility]: [phone]   [facility]: [phone]    Patient Name: Daniel Christine  YOB: 1925    Date of Initial Consult: 9-7-17  Reason for Consult: Care Decisions  Requesting Provider: Gareth Zapata MD   Primary Care Physician: Shanon Pollard., DO      SUMMARY:   Daniel Christine is a 80y.o. year old with a past history of HTN, HLD, GERD, Dementia, who was admitted on 9/6/2017 from ER/Home with a diagnosis of facial droop/AMS. Current medical issues leading to Palliative Medicine involvement include: patient found to have a CVA, asked to support patient/family to establish goals of care. PALLIATIVE DIAGNOSES:   1. AMS  2. CVA  3. Dementia  4. Debility  5. CKD stage 3       PLAN:   1. Qasim Gomez, RICH I met with patient and her grandson Mitchell Finley at bedside. She is alert, denies headache, appetite not great, ate only few bites of lunch. Grandson shared some concern about not being OOB over weekend and her incontinence of urine/stool. RN said she stooled yesterday, patient could not recall. 2. AMS-son not present, grandzhane Finley shared that she is more alert, interactive but was not given opportunity over the weekend to socialize, as never left room. 3. CVA-did get OOB today with PT, per grandson she did walk with walker and assistance but does not have the core strength to sit up on bedside commode without constant supervision. She is able to hold the beverage and drink independently. Does know when she has to void but has not had enough time to get on bedpan. Has not had a HA for several days-no MORPHINE used. Has not had more than one dose of RITALIN either, so her improvement appears to be just time. 4. Dementia-patient has needed most of her help with  IADLS-was not having any behaviors at home that were cause for alarm, no danger issues.  Son has been her sole caregiver for 7 yrs, we had lengthy discussion about the burdens of caregiving. 5. Debility-she had been continent of urine and stool, prior to the CVA, had walked with a cane only when outside the home. Fed self, had no swallowing issues, in fact loved to eat out lunch. Stopped showers, only did sink bath for 13 yrs and did dress self. She liked to care for her 2 cats, did like to be outside, watering the plants, would play cards and watch TV. 6. CKD Stage 3-last Chem done on Friday was 39/1.28 which is a bit worse than Thursday, has not had IVF and cognitively appears fairly bright, so clinically appears to be sustaining herself with oral fluids. 7. Goals of Care and Code Status-DNR/DNI entered into EMR. I did speak to the son on Friday, he said he was hoping she could take in enough fluids to maintain good fluid hydration/kidney function. He and grandson take turns in advocating for her and clearly understand the situation. Will follow and provide support. 8. Initial consult note routed to primary continuity provider  9.  Communicated plan of care with: Palliative IDT, RN, PCP       GOALS OF CARE:     [====Goals of Care====]  GOALS OF CARE:  Patient / health care proxy stated goals: to improve and consider return home, vs SAL/NF      TREATMENT PREFERENCES:   Code Status: DNR    Advance Care Planning:  Advance Care Planning 9/6/2017   Patient's Healthcare Decision Maker is: Legal Next of Richard 69   Primary Decision Maker Name Vidal Corral   Primary Decision Maker Phone Number [phone]   Primary Decision Maker Relationship to Patient -   Confirm Advance Directive None   Patient Would Like to Complete Advance Directive No   Does the patient have other document types (No Data)       Other:    The palliative care team has discussed with patient / health care proxy about goals of care / treatment preferences for patient.  [====Goals of Care====]    Advance Care Planning 9/6/2017   Patient's Healthcare Decision Maker is: Legal Next of Kin   Primary Decision Maker Name Vishal Pope   Primary Decision Maker Phone Number [phone]   Primary Decision Maker Relationship to Patient -   Confirm Advance Directive None   Patient Would Like to Complete Advance Directive No   Does the patient have other document types (No Data)      Cliff Luong-Phone number: [phone]     HISTORY:     History obtained from: Chart    CHIEF COMPLAINT: AMS/Facial Droop    HPI/SUBJECTIVE:  97.8, 82, 145/78, 20 on RA at 95%, Wt 157lb, Stooled 9-10  MAR-ASA, Lipitor, Plavix, Lovenox, (HCTZ, Lisinopril, Rtialin), Roxanol 2.5mg 9-12 at 4pm  LABS-WBC 7.2, H&H 11.6 & 34.6, Plat 242, INR 1.2, HgbA1c 6, Albumin 3, Bun/Creat 27/1.31->57/3.4->51/2.13->29/1.22->27/1.21->39/1.28, TSH 2.23,   ECHO-Left ventricle: Systolic function was normal. Ejection fraction was estimated   in the range of 55 % to 60 %. There were  no regional wall motion abnormalities. The study was not technically   sufficient to allow evaluation of LV diastolic  function. Right ventricle: Systolic function was normal.    Atrial septum: Techinically difficult bubble study. There was no obvious   evidence of intracardiac shunt by  peripherally-administered agitated saline contrast.    Aortic valve: There was no stenosis. Aorta, systemic arteries: There was mild dilatation of the ascending aorta. The ascending aortic AP dimension was 40  mm. CT head-Acute/subacute right parietal occipital lobe infarct     2. Chronic infarcts involving the right parietal occipital lobe and bilateral  basal ganglia  MRA Brain-. Occlusion proximal right PCA correlating with right PCA territory infarct. Mild adjacent mass effect is seen.     2. Additional multifocal areas of anterior and posterior circulation stenosis  including moderate to severe proximal left PCA, severe left SHANE, and distal  right M1 segment.     3.  No intracranial aneurysm is seen  9-5 Bladder scan pre-void: 189ml, post void 98ml  SLP-Dysphagia Dental soft, thin liquids  Neuro-Right PCA Territory Stroke  Already has right parieto-occipital lobe encephalomalacia. New stroke in Right PCA territory. Given already significant atrophy of brain and encephalomalacia I do not think  Cerebral edema will pose a problem. Was on on aspirin before. Will attempt MRA brain w/o contrast to look for intracranial stenosis. however the entire right PCA is looks completely infarcted so likely will not significantly change. Will get carotid U/S, and echo-unrevealing. Aspirin 325 mg daily Lipitor 80 mg. - take  mg for 90 days along with 75 mg Plavix and after 90 days stop Plavix and continue with ASA  BP already well controlled. Continue BP control.     The patient is:   [] Verbal and participatory  [x] Non-participatory due to: Has memory issues  Clinical Pain Assessment (nonverbal scale for nonverbal patients): Pain: 0denies         FUNCTIONAL ASSESSMENT:     Palliative Performance Scale (PPS):50%          PSYCHOSOCIAL/SPIRITUAL SCREENING:     Advance Care Planning:  Advance Care Planning 9/6/2017   Patient's Healthcare Decision Maker is: Legal Next of Richard Oliver   Primary Decision Maker Name Naveed Connor   Primary Decision Maker Phone Number [phone]   Primary Decision Maker Relationship to Patient -   Confirm Advance Directive None   Patient Would Like to Complete Advance Directive No   Does the patient have other document types (No Data)        Any spiritual / Episcopalian concerns:  [] Yes /  [x] No    Caregiver Burnout:  [] Yes /  [x] No /  [] No Caregiver Present      Anticipatory grief assessment:   [x] Normal  / [] Maladaptive       ESAS Anxiety: Anxiety: 0    ESAS Depression: Depression: 0        REVIEW OF SYSTEMS:     Positive and pertinent negative findings in ROS are noted above in HPI. More alert, able to hold cup and drink, no evidence of aspiration or delay in swallowing. Denies pain, last stooled per RN yesterday. Incontinent of urine.   The following systems were [] reviewed / [x] unable to be reviewed as noted in HPI  Other findings are noted below. Systems: constitutional, ears/nose/mouth/throat, respiratory, gastrointestinal, genitourinary, musculoskeletal, integumentary, neurologic, psychiatric, endocrine. Positive findings noted below. Modified ESAS Completed by: provider   Fatigue: 3 Drowsiness: 0   Depression: 0 Pain: 0   Anxiety: 0 Nausea: 0   Anorexia: 6 Dyspnea: 0     Constipation: No     Stool Occurrence(s): 1        PHYSICAL EXAM:     Wt Readings from Last 3 Encounters:   09/06/17 71.2 kg (157 lb)   09/03/17 73.5 kg (162 lb)   07/26/17 72.7 kg (160 lb 3.2 oz)     Blood pressure 145/78, pulse 82, temperature 97.8 °F (36.6 °C), resp. rate 20, height 5' 4\" (1.626 m), weight 71.2 kg (157 lb), SpO2 95 %, not currently breastfeeding. Pain:  Pain Scale 1: Numeric (0 - 10)  Pain Intensity 1: 2  Pain Onset 1: headache  Pain Location 1: Head  Pain Orientation 1: Anterior  Pain Description 1: Aching  Pain Intervention(s) 1: Medication (see MAR)  Last bowel movement: 9-17    Constitutional:eyes bright, denies pain, alert and showed that she can hold her cup and drink independently  Breathing unlabored     HISTORY:     Active Problems:    Debility (9/7/2017)      Altered mental status (9/7/2017)      CKD (chronic kidney disease) stage 3, GFR 30-59 ml/min (9/7/2017)      Past Medical History:   Diagnosis Date    Advance directive in chart 7/25/2016    Gastrointestinal disorder     GERD (gastroesophageal reflux disease)     GERD (gastroesophageal reflux disease) 11/8/2012    HTN (hypertension) 11/8/2012    Hypercholesterolemia     Hypertension     Pure hypercholesterolemia 11/8/2012      Past Surgical History:   Procedure Laterality Date    HX GYN      HX ORTHOPAEDIC        Family History   Problem Relation Age of Onset    Cancer Father      History reviewed, no pertinent family history.   Social History   Substance Use Topics    Smoking status: Never Smoker    Smokeless tobacco: Never Used    Alcohol use No     Allergies   Allergen Reactions    Pcn [Penicillins] Unable to Obtain      Current Facility-Administered Medications   Medication Dose Route Frequency    morphine (ROXANOL) 100 mg/5 mL (20 mg/mL) concentrated solution 2.6 mg  2.6 mg Oral Q4H PRN    [facility] ANESTHESIA   Other PRN    [facility] EMERGENCY/STAT BOX   Other PRN    acetaminophen (TYLENOL) tablet 650 mg  650 mg Oral Q4H PRN    aspirin (ASPIRIN) tablet 325 mg  325 mg Oral DAILY    atorvastatin (LIPITOR) tablet 80 mg  80 mg Oral QHS    clopidogrel (PLAVIX) tablet 75 mg  75 mg Oral DAILY    glucose chewable tablet 16 g  4 Tab Oral PRN    glucagon (GLUCAGEN) injection 1 mg  1 mg IntraMUSCular PRN    dextrose (D50W) injection syrg 12.5-25 g  25-50 mL IntraVENous PRN        LAB AND IMAGING FINDINGS:     Lab Results   Component Value Date/Time    WBC 11.6 09/08/2017 08:05 AM    HGB 13.0 09/08/2017 08:05 AM    PLATELET 205 41/85/6108 08:05 AM     Lab Results   Component Value Date/Time    Sodium 142 09/15/2017 05:30 AM    Potassium 5.2 09/15/2017 05:30 AM    Chloride 107 09/15/2017 05:30 AM    CO2 23 09/15/2017 05:30 AM    BUN 39 09/15/2017 05:30 AM    Creatinine 1.28 09/15/2017 05:30 AM    Calcium 8.3 09/15/2017 05:30 AM      Lab Results   Component Value Date/Time    AST (SGOT) 18 09/03/2017 11:20 AM    Alk.  phosphatase 79 09/03/2017 11:20 AM    Protein, total 6.8 09/03/2017 11:20 AM    Albumin 3.0 09/03/2017 11:20 AM    Globulin 3.8 09/03/2017 11:20 AM     Lab Results   Component Value Date/Time    INR 1.2 09/03/2017 11:20 AM    Prothrombin time 14.7 09/03/2017 11:20 AM    aPTT 21.5 06/12/2013 11:00 AM      No results found for: IRON, FE, TIBC, IBCT, PSAT, FERR   No results found for: PH, PCO2, PO2  No components found for: [place]   Lab Results   Component Value Date/Time    CK 89 09/03/2017 11:20 AM    CK - MB <1.0 09/03/2017 11:20 AM              Total time: 40  Counseling / coordination time, spent as noted above: 30  > 50% counseling / coordination?: yes with patient, michelle Acosta at bedside  Prolonged service was provided for  []30 min   []75 min in face to face time in the presence of the patient, spent as noted above. Time Start:   Time End:   Note: this can only be billed with 86188 (initial) or 91854 (follow up). If multiple start / stop times, list each separately.

## 2017-09-18 NOTE — PROGRESS NOTES
conducted a Follow up consultation and Spiritual Assessment for Ck Noguera, who is a 80 y.o.,female. The  provided the following Interventions:  Continued the relationship of care and support. Listened empathically. Offered prayer and assurance of continued prayer on patients behalf. Chart reviewed. The following outcomes were achieved:  Patient expressed gratitude for 's visit. Assessment:  There are no spiritual or Quaker issues which require intervention at this time. Plan:  Chaplains will continue to follow and will provide pastoral care on an as needed/requested basis.  recommends bedside caregivers page  on duty if patient shows signs of acute spiritual or emotional distress. Louie Luz M.Div.   [facility] [address]  [phone]

## 2017-09-18 NOTE — ROUTINE PROCESS
Bedside and Verbal shift change report given to luc silva rn (oncoming nurse) by nely Lala lpn (offgoing nurse). Report included the following information SBAR, Kardex and MAR.  Hourly rounds

## 2017-09-18 NOTE — PROGRESS NOTES
[facility] SPEECH THERAPY  WEEKLY PROGRESS NOTE    Reporting Period:  9/7/17  To 9/13/17       Patient: Ashlyn Zee (59 y.o. female)          Date: 9/13/17   Primary Diagnosis: HTN    Attending Physician: Alvino Griffith MDTreatment Diagnosis  Treatment Diagnosis: oropharyngeal dysphagia (oropharyngeal dysphagia)  Treatment Diagnosis 2: cognitive-linguistic deficit  Precautions: Fall (blind L eye, monitor ortho BPs)  Rehab Potential : Fair        Skilled intervention provided with clinical rationale (include individualized treatment techniques and standardized tests):  SLP provided skilled assessments of swallow fxn across various textures with multimodal cueing for utilization of compensatory swallow techniques for decreased aspiration risk. Cueing also provided for engagement in oropharyngeal/laryngeal strengthening exercises for improved tolerance of po intake for possible diet advancement for QOL. SLP completed skilled meal assessments and dietary modifications, as indicated, in order to determine safest, least-restrictive diet. MBS completed 9/12/17 revealing repeated trace aspiration with thin liquids and residuals in laryngeal recesses which place pt at risk for aspiration. Diet recommendations were for mech-soft/nectar-thick liquids at that time. Graded expressive communication tasks and those addressing executive function (specifically memory and visuospatial tasks completed for increased functionality and decreased level of dependency for daily activities. Using a comparative statement, summarize significant progress toward goals as a result of skilled intervention provided:   Pt making slow but steady gains as evidenced by increased therapy tolerance and participation across all tasks. Secondary to varying levels of alertness since [facility] order requested for diet change to puree/nectar-thick liquid for more effective oral bolus prep and transit.  Swallow reflex latency continues >3 seconds. Benefits from multimodal cues for increased rotary manipulation and fast effortful swallow. Varying levels of L buccal cavity pocketing also requiring cues to lingual sweep. Max multimodal cueing provided for L scanning across varying written and safety tasks; minimal carryover exhibited. Identify remaining functional areas, impairments limiting progress and/or barriers to improvement:  Continues to require strong encouragement and reinforcement for participation with therapy. PO intake amount continues compromised and may impede nutritional intake. Educated pt/family on aspiration precautions and importance of compensatory swallow techniques to decrease aspiration risk (decrease rate of intake & sip/bite size, upright @HOB for all po intake and ~30 minutes after po, increased rotary manipulation, fast/effortful swallow, L lingual sweep); family verbalized comprehension and pt requires reinforcement. OBJECTIVE DATA SUMMARY:     INITIAL ASSESSMENT CURRENT ASSESSMENT   Cognitive and Communication Status: Cognitive and Communication Status:   Neurologic State: Confused  Orientation Level: Oriented to person  Cognition: Follows commands  Safety/Judgement: Decreased insight into deficits Neurologic State: Alert  Orientation Level: Oriented to person  Cognition: Decreased attention/concentration   Pain Pain   Numeric (0 - 10)     0     Head     Oral Assessment: Oral Assessment:         P.O. Trials: P.O. Trials:   Neuromuscular Estim Used: No  Assessment Method(s): Observation, Palpation  Patient Position: HOB 60  Vocal Quality: No impairment  Consistency Presented:  Thin liquid, Nectar thick liquid, Puree, Mechanical soft  How Presented: Self-fed/presented, Straw, Successive swallows  How Much: 20  Bolus Acceptance: No impairment  Bolus Formation/Control: Impaired  Type of Impairment: Delayed, Mastication  Propulsion: Delayed (# of seconds), Discoordination  Oral Residue: 10-50% of bolus, Lingual  Initiation of Swallow: Delayed (# of seconds)  Laryngeal Elevation: Functional  Aspiration Signs/Symptoms: Clear throat, Watery eyes  Pharyngeal Phase Characteristics: Altered vocal quality, Poor endurance, Suspected pharyngeal residue  Effective Modifications: Small sips and bites  Cues for Modifications: Moderate Neuromuscular Estim Used: No  Assessment Method(s): Observation, Palpation  Patient Position: HOB 60  Vocal Quality: No impairment  Consistency Presented: Thin liquid, Nectar thick liquid, Puree, Mechanical soft  How Presented: Self-fed/presented, Straw, Successive swallows  How Much: 20  Bolus Acceptance: No impairment  Bolus Formation/Control: Impaired  Type of Impairment: Delayed, Mastication  Propulsion: Delayed (# of seconds), Discoordination  Oral Residue: 10-50% of bolus, Lingual  Initiation of Swallow: Delayed (# of seconds)  Laryngeal Elevation: Functional  Aspiration Signs/Symptoms: Clear throat, Watery eyes  Pharyngeal Phase Characteristics: Altered vocal quality, Poor endurance, Suspected pharyngeal residue  Effective Modifications: Small sips and bites  Cues for Modifications: Moderate                     Findings and Recommendations: Findings and Recommendations:   Evaluation Type: Eval O/P Swallow  Assessment Method(s): Observation, Palpation  Patient Position: [facility] [place] [facility]  Vocal Quality: No impairment  Consistency Presented:  Thin liquid, Nectar thick liquid, Puree, Mechanical soft  How Presented: Self-fed/presented, Straw, Successive swallows  Bolus Acceptance: No impairment  Bolus Formation/Control: Impaired  Type of Impairment: Delayed, Mastication  Propulsion: Delayed (# of seconds), Discoordination  Oral Residue: 10-50% of bolus, Lingual  Oral Phase Severity: Moderate  Pharyngeal Phase Severity : Moderate  Initiation of Swallow: Delayed (# of seconds)  Laryngeal Elevation: Functional  Aspiration Signs/Symptoms: Clear throat, Watery eyes Evaluation Type: Eval O/P Swallow  Assessment Method(s): Observation;Palpation  Patient Position: HOB 60  Vocal Quality: No impairment  Consistency Presented: Thin liquid; Nectar thick liquid;Puree;Mechanical soft  How Presented: Self-fed/presented;Straw;Successive swallows  Bolus Acceptance: No impairment  Bolus Formation/Control: Impaired  Type of Impairment: Delayed;Mastication  Propulsion: Delayed (# of seconds); Discoordination  Oral Residue: 10-50% of bolus; Lingual  Oral Phase Severity: Moderate  Pharyngeal Phase Severity : Moderate  Initiation of Swallow: Delayed (# of seconds)  Laryngeal Elevation: Functional  Aspiration Signs/Symptoms: Clear throat; Watery eyes     Goal or treatment changes with explanation & therapist recommendation:  Continue current treatment plan for facilitation of daily living activity completion. Current/Updated STGs:  To be completed in 2-3 weeks  Pt will:  1. Complete visual-spatial tasks related to functional ADLs for safe, social reintegration with 70% acc, mod visual/verbal cues (currently 25%, max A)  2. Recall > 7 items per concrete category with mod visual/verbal cues in 4/5 trials (currently 3 items, max A)  3. Perform basic ADL/IADL problem solving tasks with 70% acc, with mod visual/verbal cues (currently 70% mod A)  4. Recall 3-5 words after delay/distraction increase recall abilities as related to community reintegration with mod visual/verbal cues in 3/5 trials (not addressed)  5. (NEW GOAL 9/11/17) Tolerate mech-soft, thin liquid trials without overt s/sx aspiration/distress in 4/5 trials with min visual/verbal/tactile cues (currently teary eyes and weak delayed cough)  6. Utilize compensatory swallow maneuvers (decrease bolus size, decrease rate of intake, cyclical ingestion, etc.) to increase swallow function/safety with min visual, verbal and/or tactile cues in 4/5 trials. (max verbal cues)   (max A)  8.  Complete an objective measure of swallow fxn (i.e., MBSS) to assess oropharyngeal anatomy/physiology for determination of safest least-restrictive diet and/or appropriate rehabilitation techniques. (9/12/17)    Expected Discharge Location:  [x]Private Residence  []  SAL/ILF  [x]LTC  []Other: TBD with therapy progress    Plan:  Continue skilled speech therapy intervention 1-2 times/day for 5 days/week to address goals.       Therapist: FCO Cheng       Date:9/18/2017

## 2017-09-18 NOTE — PROGRESS NOTES
I have reviewed this patient's current medication list and recent laboratory results. At this time, I do not suggest any drug therapy adjustments or additional laboratory monitoring. Thank you,  Holger MACEDO  Ph. M. S.  9/18/2017

## 2017-09-18 NOTE — PROGRESS NOTES
Problem: Mobility Impaired (Adult and Pediatric)  Goal: *Acute Goals and Plan of Care (Insert Text)  PHYSICAL THERAPY STG GOALS :  Initiated 9/7/2017 and to be accomplished within 2 Weeks (Updated 9/13/17)     1. Patient will move from supine to sit and sit to supine , scoot up and down and roll side to side in bed with minimal assistance/contact guard assist. (Maintaining at Max A)   2. Patient will transfer from bed to chair and chair to bed with minimal assistance/contact guard assist using RW. (Maintaining at Total A X2)   3. Patient will perform sit to stand with minimal assistance/contact guard assist with Fair balance and safety awareness. (Maintaining at Total A x2)   4. Patient will ambulate with moderate assistance for 25 feet with RW on level surfaces with 2 turns. (NT, pt unable at this time)     PHYSICAL THERAPY LTG GOALS :  Initiated 9/7/2017 and to be accomplished within 6 Weeks    1. Patient will move from supine to sit and sit to supine , scoot up and down and roll side to side in bed with supervision/set-up. 2. Patient will transfer from bed to chair and chair to bed with supervision/set-up using RW. 3. Patient will perform sit to stand with RW supervision/set-up with Good balance and safety awareness. 4. Patient will ambulate with supervision/set-up for 100 feet with RW on level surfaces and be able to maneuver through narrow spaces and obstacles without loss of balance. 5. Patient will ascend/descend TBA. Veronica Ortiz Physical Therapist: Abdi Guerra, PT on 9/7/2017    [facility]   PHYSICAL THERAPY DAILY TREATMENT NOTE        Patient:  Hanny Parrish (58 y.o. female)               Date: 9/18/2017    Physician: Martell Asencio MD  Primary Diagnosis: HTN          Treatment Diagnosis  Treatment Diagnosis: oropharyngeal dysphagia (oropharyngeal dysphagia)  Treatment Diagnosis 2: cognitive-linguistic deficit  Precautions: Fall (blind L eye, monitor ortho BPs)  Vital Signs  Vital Signs  Temp: 97.8 °F (36.6 °C)  Temp Source: Oral  Pulse (Heart Rate): 82  Heart Rate Source: Monitor  Resp Rate: 20  O2 Sat (%): 95 %  Level of Consciousness: Alert  BP: 145/78  MAP (Calculated): 100  MEWS Score: 1  Cognitive Status:  Mental Status  Neurologic State: Alert  Orientation Level: Oriented to person  Cognition: Decreased attention/concentration  Pain  Bed Mobility Training  Bed Mobility Training  Rolling: Minimum assistance;Contact guard assistance  Supine to Sit: Minimum assistance  Sit to Supine: Moderate assistance  Scooting: Moderate assistance  Interventions: Safety awareness training;Verbal cues; Tactile cues;Manual cues  Balance  Sitting: With support; Impaired  Sitting - Static: Prop sitting  Sitting - Dynamic: Poor (constant support)  Standing: Impaired;Pull to stand; With support  Standing - Static: Constant support;Poor  Standing - Dynamic : Poor  Transfer Training  Transfer Training  Sit to Stand: Moderate assistance;Assist x2  Stand to Sit: Moderate assistance;Assist x2  Interventions: Safety awareness training;Verbal cues  Sit to Stand: Moderate assistance;Assist x2  Gait Training  Therapeutic Exercise: Session One: Pt presented supine in bed. Pt's Son Enriqueta Thomas and Onel Maldonado present. Pt's son enquired about pt getting up OOB today. Therapist explained use of rambo lift for safety with transfers at this time. Pt's son with concerns about amount of time pt is OOB. Therapist reported to rehab manager. Rehab manager to follow up with pt's concerns. Session Two: Co-treat with OT to maximize functional gains with bed mobility, seated balance and sit<>stand to allow for progression to transfer training. Bed mobility with improvements noted in rolling and supine>sit, see above for levels of assistance. Pt able to sit EOB with prop sitting and SBA, but continues to require CGA to min A for dynamic seated activities.  Core strengthening exercises rendered to promote control and strength to improve seated posture and reduce risk of falls. Sit<>stand with Mod A x2 with pull-to stand method. Pt required verbal, tactile and manual cues for hand placement. Pt able to stand for 1x45\", 1x30\" 1x~10\" before requesting to sit. Pt able to take 3 side steps with RW towards [facility] with Mod A and verbal cues with manual assistance for RW management. At end of session, pt supine in bed with call bell. Patient/Caregiver Education:   Pt /Caregiver Education on safety and fall prevention, upright posture was provided to reduce risk of falls. ASSESSMENT:  Patient continues to benefit from Skilled PT services to improve bed mobility, sit<>stand, transfers, strength, balance and gait. Progression toward goals:  [ ]      Improving appropriately and progressing toward goals  [X]      Improving slowly and progressing toward goals  [ ]      Not making progress toward goals and plan of care will be adjusted      Treatment session: 43 minutes.   Therapist:   Genoveva Leyva PTA,          9/18/2017

## 2017-09-19 NOTE — PROGRESS NOTES
[facility]: [phone] ([phone])  hospitals: [phone]   [facility]/[address]: [phone]    Patient Name: Brenton Felix  YOB: 1925    Date of Initial Consult: 9-7-17  Reason for Consult: Care Decisions  Requesting Provider: Sincere Lloyd MD   Primary Care Physician: Anton Anderson., DO      SUMMARY:   Brenton Felix is a 80y.o. year old with a past history of HTN, HLD, GERD, Dementia, who was admitted on 9/6/2017 from ER/Home with a diagnosis of facial droop/AMS. Current medical issues leading to Palliative Medicine involvement include: patient found to have a CVA, asked to support patient/family to establish goals of care. PALLIATIVE DIAGNOSES:   1. AMS  2. CVA  3. Dementia  4. Debility  5. CKD stage 3       PLAN:   1. I met with patient and her grandson Hema Mendoza, at the dining area, she was returned from PT in a W/C. She denies headache, was alert and oriented to self, age but did not know where she was or the date, knew the [address] 2. AMS-son not present, grandson Hema Mendoza agreed that she was doing a bit better and was happy as she was walking in PT. 3. CVA-post CVA is now more consistently alert and able to eat/drink. 4. Dementia-patient has needed most of her help with  IADLS-was not having any behaviors at home that were cause for alarm, no danger issues. Son has been her sole caregiver for 7 yrs, we had lengthy discussion about the burdens of caregiving. 5. Debility-remains to be determined if patient will regain her level of function, to her pre CVA ability, in particular if she will be able to have CORE STRENGTH and be OOB on commode without help, she knows now when she has to void but there is not enough time to get her on commode.   6. CKD Stage 3-shared with both patient and Hema Mendoza today's chemistry result 35/1.16, that indeed she is taking enough fluids to keep herself hydrated and that if she can maintain this ongoing monitoring will not be necessary. 7. Goals of Care and Code Status-DNR/DNI entered into EMR. Patient now without IVF for several days and has proven adequate oral intake to maintain good renal function/hydration. Will follow and provide support. 8. Initial consult note routed to primary continuity provider  9.  Communicated plan of care with: Palliative IDT, RN, PCP       GOALS OF CARE:     [====Goals of Care====]  GOALS OF CARE:  Patient / health care proxy stated goals: to improve and consider return home, vs snf/NF      TREATMENT PREFERENCES:   Code Status: DNR    Advance Care Planning:  Advance Care Planning 9/6/2017   Patient's Healthcare Decision Maker is: Legal Next of Richard Oliver   Primary Decision Maker Name Radha Bernal   Primary Decision Maker Phone Number [phone]   Primary Decision Maker Relationship to Patient -   Confirm Advance Directive None   Patient Would Like to Complete Advance Directive No   Does the patient have other document types (No Data)       Other:    The palliative care team has discussed with patient / health care proxy about goals of care / treatment preferences for patient.  [====Goals of Care====]    Advance Care Planning 9/6/2017   Patient's Healthcare Decision Maker is: Legal Next of Richard Oliver   Primary Decision Maker Name Radha Bernal   Primary Decision Maker Phone Number [phone]   Primary Decision Maker Relationship to Patient -   Confirm Advance Directive None   Patient Would Like to Complete Advance Directive No   Does the patient have other document types (No Data)      Cliff Luong-Phone number: [phone]     HISTORY:     History obtained from: Chart    CHIEF COMPLAINT: AMS/Facial Droop    HPI/SUBJECTIVE:  97.8, 82, 145/78, 20 on RA at 95%, Wt 157lb, Stooled 9-10  MAR-ASA, Lipitor, Plavix, Lovenox, (HCTZ, Lisinopril, Rtialin), Roxanol 2.5mg 9-12 at 4pm  LABS-WBC 7.2, H&H 11.6 & 34.6, Plat 242, INR 1.2, HgbA1c 6, Albumin 3, Bun/Creat 27/1.31->57/3.4->51/2.13->29/1.22->27/1.21->39/1.28->35/1.16, TSH 2.23,   ECHO-Left ventricle: Systolic function was normal. Ejection fraction was estimated   in the range of 55 % to 60 %. There were  no regional wall motion abnormalities. The study was not technically   sufficient to allow evaluation of LV diastolic  function. Right ventricle: Systolic function was normal.    Atrial septum: Techinically difficult bubble study. There was no obvious   evidence of intracardiac shunt by  peripherally-administered agitated saline contrast.    Aortic valve: There was no stenosis. Aorta, systemic arteries: There was mild dilatation of the ascending aorta. The ascending aortic AP dimension was 40  mm. CT head-Acute/subacute right parietal occipital lobe infarct     2. Chronic infarcts involving the right parietal occipital lobe and bilateral  basal ganglia  MRA Brain-. Occlusion proximal right PCA correlating with right PCA territory infarct. Mild adjacent mass effect is seen.     2. Additional multifocal areas of anterior and posterior circulation stenosis  including moderate to severe proximal left PCA, severe left SHANE, and distal  right M1 segment.     3. No intracranial aneurysm is seen  9-5 Bladder scan pre-void: 189ml, post void 98ml  SLP-Dysphagia Dental soft, thin liquids  Neuro-Right PCA Territory Stroke  Already has right parieto-occipital lobe encephalomalacia. New stroke in Right PCA territory. Given already significant atrophy of brain and encephalomalacia I do not think  Cerebral edema will pose a problem. Was on on aspirin before. Will attempt MRA brain w/o contrast to look for intracranial stenosis. however the entire right PCA is looks completely infarcted so likely will not significantly change. Will get carotid U/S, and echo-unrevealing.    Aspirin 325 mg daily Lipitor 80 mg. - take  mg for 90 days along with 75 mg Plavix and after 90 days stop Plavix and continue with ASA  BP already well controlled. Continue BP control.     The patient is:   [] Verbal and participatory  [x] Non-participatory due to: Has memory issues  Clinical Pain Assessment (nonverbal scale for nonverbal patients): Pain: 0denies         FUNCTIONAL ASSESSMENT:     Palliative Performance Scale (PPS):50%          PSYCHOSOCIAL/SPIRITUAL SCREENING:     Advance Care Planning:  Advance Care Planning 9/6/2017   Patient's Healthcare Decision Maker is: Legal Next of Richard Oliver   Primary Decision Maker Name Radha Bernal   Primary Decision Maker Phone Number [phone]   Primary Decision Maker Relationship to Patient -   Confirm Advance Directive None   Patient Would Like to Complete Advance Directive No   Does the patient have other document types (No Data)        Any spiritual / Judaism concerns:  [] Yes /  [x] No    Caregiver Burnout:  [] Yes /  [x] No /  [] No Caregiver Present      Anticipatory grief assessment:   [x] Normal  / [] Maladaptive       ESAS Anxiety: Anxiety: 0    ESAS Depression: Depression: 0        REVIEW OF SYSTEMS:     Positive and pertinent negative findings in ROS are noted above in HPI. More alert, in good spiritis. Denies pain, last stooled per RN today. Incontinent of urine. The following systems were [] reviewed / [x] unable to be reviewed as noted in HPI  Other findings are noted below. Systems: constitutional, ears/nose/mouth/throat, respiratory, gastrointestinal, genitourinary, musculoskeletal, integumentary, neurologic, psychiatric, endocrine. Positive findings noted below. Modified ESAS Completed by: provider   Fatigue: 2 Drowsiness: 0   Depression: 0 Pain: 0   Anxiety: 0 Nausea: 0   Anorexia: 2 Dyspnea: 0     Constipation: No     Stool Occurrence(s): 1        PHYSICAL EXAM:     Wt Readings from Last 3 Encounters:   09/06/17 71.2 kg (157 lb)   09/03/17 73.5 kg (162 lb)   07/26/17 72.7 kg (160 lb 3.2 oz)     Blood pressure 145/78, pulse 82, temperature 97.8 °F (36.6 °C), resp.  rate 20, height 5' 4\" (1.626 m), weight 71.2 kg (157 lb), SpO2 95 %, not currently breastfeeding. Pain:  Pain Scale 1: Numeric (0 - 10)  Pain Intensity 1: 2  Pain Onset 1: headache  Pain Location 1: Head  Pain Orientation 1: Anterior  Pain Description 1: Aching  Pain Intervention(s) 1: Medication (see MAR)  Last bowel movement: 9-19    Constitutional:alert and oriented x 2, in no distress  Breathing unlabored     HISTORY:     Active Problems:    Debility (9/7/2017)      Altered mental status (9/7/2017)      CKD (chronic kidney disease) stage 3, GFR 30-59 ml/min (9/7/2017)      Past Medical History:   Diagnosis Date    Advance directive in chart 7/25/2016    Gastrointestinal disorder     GERD (gastroesophageal reflux disease)     GERD (gastroesophageal reflux disease) 11/8/2012    HTN (hypertension) 11/8/2012    Hypercholesterolemia     Hypertension     Pure hypercholesterolemia 11/8/2012      Past Surgical History:   Procedure Laterality Date    HX GYN      HX ORTHOPAEDIC        Family History   Problem Relation Age of Onset    Cancer Father      History reviewed, no pertinent family history.   Social History   Substance Use Topics    Smoking status: Never Smoker    Smokeless tobacco: Never Used    Alcohol use No     Allergies   Allergen Reactions    Pcn [Penicillins] Unable to Obtain      Current Facility-Administered Medications   Medication Dose Route Frequency    morphine (ROXANOL) 100 mg/5 mL (20 mg/mL) concentrated solution 2.6 mg  2.6 mg Oral Q4H PRN    [facility] ANESTHESIA   Other PRN    [facility] EMERGENCY/STAT BOX   Other PRN    acetaminophen (TYLENOL) tablet 650 mg  650 mg Oral Q4H PRN    aspirin (ASPIRIN) tablet 325 mg  325 mg Oral DAILY    atorvastatin (LIPITOR) tablet 80 mg  80 mg Oral QHS    clopidogrel (PLAVIX) tablet 75 mg  75 mg Oral DAILY    glucose chewable tablet 16 g  4 Tab Oral PRN    glucagon (GLUCAGEN) injection 1 mg  1 mg IntraMUSCular PRN    dextrose (D50W) injection syrg 12.5-25 g 25-50 mL IntraVENous PRN        LAB AND IMAGING FINDINGS:     Lab Results   Component Value Date/Time    WBC 11.5 09/19/2017 08:10 AM    HGB 11.5 09/19/2017 08:10 AM    PLATELET 764 53/83/0226 08:10 AM     Lab Results   Component Value Date/Time    Sodium 143 09/19/2017 08:10 AM    Potassium 4.5 09/19/2017 08:10 AM    Chloride 110 09/19/2017 08:10 AM    CO2 25 09/19/2017 08:10 AM    BUN 35 09/19/2017 08:10 AM    Creatinine 1.16 09/19/2017 08:10 AM    Calcium 9.0 09/19/2017 08:10 AM      Lab Results   Component Value Date/Time    AST (SGOT) 18 09/03/2017 11:20 AM    Alk. phosphatase 79 09/03/2017 11:20 AM    Protein, total 6.8 09/03/2017 11:20 AM    Albumin 3.0 09/03/2017 11:20 AM    Globulin 3.8 09/03/2017 11:20 AM     Lab Results   Component Value Date/Time    INR 1.2 09/03/2017 11:20 AM    Prothrombin time 14.7 09/03/2017 11:20 AM    aPTT 21.5 06/12/2013 11:00 AM      No results found for: IRON, FE, TIBC, IBCT, PSAT, FERR   No results found for: PH, PCO2, PO2  No components found for: [place]   Lab Results   Component Value Date/Time    CK 89 09/03/2017 11:20 AM    CK - MB <1.0 09/03/2017 11:20 AM              Total time: 20  Counseling / coordination time, spent as noted above: 10  > 50% counseling / coordination?: yes with patientmichelle   Prolonged service was provided for  []30 min   []75 min in face to face time in the presence of the patient, spent as noted above. Time Start:   Time End:   Note: this can only be billed with 02984 (initial) or 73881 (follow up). If multiple start / stop times, list each separately.

## 2017-09-19 NOTE — PROGRESS NOTES
Problem: Communication Impaired (Adult)  Goal: *Acute Goals and Plan of Care (Insert Text)  STGs: To be completed in 2-3 weeks  Pt will:  1. Complete visual-spatial tasks related to functional ADLs for safe, social reintegration with 70% acc, mod visual/verbal cues  2. Recall > 7 items per concrete category with mod visual/verbal cues in 4/5 trials  3. Perform basic ADL/IADL problem solving tasks with 70% acc, with mod visual/verbal cues  4. Recall 3-5 words after delay/distraction increase recall abilities as related to community reintegration with mod visual/verbal cues in 3/5 trials   5. (NEW GOAL 9/11/17) Tolerate mech-soft, thin liquid trials without overt s/sx aspiration/distress in 4/5 trials with min visual/verbal/tactile cues  6. Utilize compensatory swallow maneuvers (decrease bolus size, decrease rate of intake, cyclical ingestion, etc.) to increase swallow function/safety with min visual, verbal and/or tactile cues in 4/5 trials. 7. Perform oral-motor/laryngeal strengthening exercises to increase swallow strength to decrease aspiration risk, min A  8. Complete an objective measure of swallow fxn (i.e., MBSS) to assess oropharyngeal anatomy/physiology for determination of safest least-restrictive diet and/or appropriate rehabilitation techniques. LTG: To be completed in 4 weeks  Pt will:  1. Complete visual spatial and abstract reasoning tasks for safe completion of ADLs for return home and community reintegration with 80% acc, min A  2. Complete working memory tasks for safe effective completion of IADLs upon return home alone, with 80% acc, min A on with min-mod visual/verbal cues in 3/5 trials   3. Patient will tolerate mech-soft/thin liquid diet with 0 clinical s/sx aspiration, min A, for meeting nutritional needs and quality of life         SPEECH LANGUAGE PATHOLOGY   DAILY TREATMENT NOTE     Patient:  Donna Bustillo (45 y.o. female)            Date: 9/18/17  Diagnosis: HTN <principal problem not specified>     Precautions: aspiration Fall (blind L eye, monitor ortho BPs)      SUBJECTIVE:   Patient stated I'm awake. OBJECTIVE:      Cognitive and Communication Status:  Neurologic State: Alert  Orientation Level: Oriented to person  Cognition: Decreased attention/concentration        Safety/Judgement: Decreased insight into deficits  Dysphagia Treatment:  See below     Oral Assessment:     P.O. Trials:  Patient Position: HOB 60  Vocal quality prior to P.O.:  Low volume  Consistency Presented: Nectar thick liquid, Mechanical soft, Pudding, Puree  How Presented: SLP-fed/presented, Straw, Spoon  How Much: 20  Bolus Acceptance: Impaired  Bolus Formation/Control: Impaired  Type of Impairment: Delayed, Mastication  Propulsion: Delayed (# of seconds), Discoordination  Oral Residue: Left, Pocketing, Lingual, 10-50% of bolus  Initiation of Swallow: Delayed (# of seconds)  Laryngeal Elevation: Decreased  Aspiration Signs/Symptoms: Strong cough  Pharyngeal Phase Characteristics: Altered vocal quality, Poor endurance, Suspected pharyngeal residue  Effective Modifications: None  Cues for Modifications: Maximal     Oral Phase Severity: Moderate-severe  Pharyngeal Phase Severity : Moderate-severe            ASSESSMENT:  Follow up this afternoon with skilled therapeutic snack (puree/thin liquids) after oral care (in co-tx with OT). Minimal eye tearing noted with cup sips of thin liquids. No overt s/s aspiration. With puree, pt required min cues for increased rotary manipulation and max effortful swallow. Minimal-0 carryover exhibited with dysphagia tx. Left visual spatial tasks with pt requiring to ID letters and signs on left side of page; 70% with mod visual/verbal cues. Able to name ~5 items per concrete category with min cues for expansion. SLP will continue to follow. Educated family on progress and POC.       Progression toward goals:  [ ]         Improving appropriately and progressing toward goals  [X] Improving slowly and progressing toward goals  [ ]        Not making progress toward goals and plan of care will be adjusted       PLAN:  Recommendations and Planned Interventions:  Patient continues to benefit from skilled intervention to address the above impairments. Continue treatment per established plan of care.   Discharge Recommendations:  [place]:   [X]              Aspiration precautions; compensatory swallow techniques        Patient's response to Treatment rendered:  Improved response to tx today     Treatment Time:  56 Minutes     Therapist:  Jonathon Pineda SLP        9/18/17

## 2017-09-19 NOTE — PROGRESS NOTES
Problem: Mobility Impaired (Adult and Pediatric)  Goal: *Acute Goals and Plan of Care (Insert Text)  PHYSICAL THERAPY STG GOALS :  Initiated 9/7/2017 and to be accomplished within 2 Weeks (Updated 9/13/17)     1. Patient will move from supine to sit and sit to supine , scoot up and down and roll side to side in bed with minimal assistance/contact guard assist. (Maintaining at Max A)   2. Patient will transfer from bed to chair and chair to bed with minimal assistance/contact guard assist using RW. (Maintaining at Total A X2)   3. Patient will perform sit to stand with minimal assistance/contact guard assist with Fair balance and safety awareness. (Maintaining at Total A x2)   4. Patient will ambulate with moderate assistance for 25 feet with RW on level surfaces with 2 turns. (NT, pt unable at this time)     PHYSICAL THERAPY LTG GOALS :  Initiated 9/7/2017 and to be accomplished within 6 Weeks    1. Patient will move from supine to sit and sit to supine , scoot up and down and roll side to side in bed with supervision/set-up. 2. Patient will transfer from bed to chair and chair to bed with supervision/set-up using RW. 3. Patient will perform sit to stand with RW supervision/set-up with Good balance and safety awareness. 4. Patient will ambulate with supervision/set-up for 100 feet with RW on level surfaces and be able to maneuver through narrow spaces and obstacles without loss of balance. 5. Patient will ascend/descend TBA. Jake Wedlon Physical Therapist: Carlos Barnett, PT on 9/7/2017    [facility]   PHYSICAL THERAPY DAILY TREATMENT NOTE        Patient:  Coral Haley (27 y.o. 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)  Vital Signs  Cognitive Status:  Mental Status  Neurologic State: Alert  Orientation Level: Oriented to person  Cognition: Decreased attention/concentration  Pain  Bed Mobility Training  Bed Mobility Training  Supine to Sit: Minimum assistance; Additional time  Scooting: Minimum assistance; Additional time  Interventions: Safety awareness training;Verbal cues; Tactile cues;Manual cues  Balance  Sitting: With support; Impaired  Sitting - Static: Prop sitting  Sitting - Dynamic: Poor (constant support)  Standing: Impaired;Pull to stand; With support  Standing - Static: Constant support;Poor  Standing - Dynamic : Poor  Transfer Training  Transfer Training  Sit to Stand: Minimum assistance;Assist x2; Additional time  Stand to Sit: Moderate assistance;Assist x2  Interventions: Safety awareness training;Verbal cues; Tactile cues;Manual cues  Sit to Stand: Minimum assistance;Assist x2; Additional time  Gait Training  Gait  Base of Support: Center of gravity altered  Speed/Alfreda: Delayed;Pace decreased (<100 feet/min); Shuffled  Step Length: Right shortened;Left shortened  Gait Abnormalities: Decreased step clearance; Step to gait; Shuffling gait  Ambulation - Level of Assistance: Moderate assistance; Additional time;Assist x1  Distance (ft): 5 Feet (ft)  Assistive Device: Other (comment) (at // with B UE support )  Interventions: Safety awareness training;Verbal cues; Tactile cues;Manual cues; Visual/Demos  Gait Abnormalities: Decreased step clearance; Step to gait; Shuffling gait    With 0 turns. Therapeutic Exercise: Co-treat with OT to maximize functional gains with bed mobility, transfers, standing balance and initiating gait training due to pt's low activity tolerance and poor balance. Bed mobility as noted above. Pt continues to require verbal and tactile cues for sequencing and with prop sitting at EOB. Pt with forward posture and required verbal cues to correct while seated. Sit>stand from EOB with pull-to-stand method and Min A x2 at RW.  Stand-step transfer from EOB>w/c with Min A x2 and step-by-step instructions with some safety concerns with stand>sit. Gait training with abovementioned details and close w/c follow for safety. Pt with extensive verbal, tactile and occasional manual cues with ambulation for advancing L LE and L UE. Seated balance activities with mirror placed on L side to encourage use of L UE and to look to L side with reaching across body to retreive items. Sit<>stand x2 with Min A x2 and cues as noted above. Stand-step transfer from low back to high back w/c due to lack of head control for added support to allow pt to safely sit up OOB. Pt remained sitting up in dinning area with Grandson. Patient/Caregiver Education:   Pt /Caregiver Education on safety and fall prevention with transfers, gait and posture was provided to reduce risk of falls. ASSESSMENT:  Patient continues to benefit from Skilled PT services to improve bed mobility, transfers, strength, balance, and gait. Progression toward goals:  [ ]      Improving appropriately and progressing toward goals  [X]      Improving slowly and progressing toward goals  [ ]      Not making progress toward goals and plan of care will be adjusted      Treatment session: 60 minutes.   Therapist:   Francisco King PTA,          9/19/2017

## 2017-09-19 NOTE — PROGRESS NOTES
Problem: Communication Impaired (Adult)  Goal: *Acute Goals and Plan of Care (Insert Text)  STGs: To be completed in 2-3 weeks  Pt will:  1. Complete visual-spatial tasks related to functional ADLs for safe, social reintegration with 70% acc, mod visual/verbal cues  2. Recall > 7 items per concrete category with mod visual/verbal cues in 4/5 trials  3. Perform basic ADL/IADL problem solving tasks with 70% acc, with mod visual/verbal cues  4. Recall 3-5 words after delay/distraction increase recall abilities as related to community reintegration with mod visual/verbal cues in 3/5 trials   5. (NEW GOAL 9/11/17) Tolerate mech-soft, thin liquid trials without overt s/sx aspiration/distress in 4/5 trials with min visual/verbal/tactile cues  6. Utilize compensatory swallow maneuvers (decrease bolus size, decrease rate of intake, cyclical ingestion, etc.) to increase swallow function/safety with min visual, verbal and/or tactile cues in 4/5 trials. 7. Perform oral-motor/laryngeal strengthening exercises to increase swallow strength to decrease aspiration risk, min A  8. Complete an objective measure of swallow fxn (i.e., MBSS) to assess oropharyngeal anatomy/physiology for determination of safest least-restrictive diet and/or appropriate rehabilitation techniques. LTG: To be completed in 4 weeks  Pt will:  1. Complete visual spatial and abstract reasoning tasks for safe completion of ADLs for return home and community reintegration with 80% acc, min A  2. Complete working memory tasks for safe effective completion of IADLs upon return home alone, with 80% acc, min A on with min-mod visual/verbal cues in 3/5 trials   3. Patient will tolerate mech-soft/thin liquid diet with 0 clinical s/sx aspiration, min A, for meeting nutritional needs and quality of life            [id number] [facility] SPEECH-LANGUAGE    DAILY TREATMENT NOTE     Patient:  Juan Lenz (80 y.o. female)                                Date: 9/19/2017       Primary Diagnosis: HTN   Treatment Diagnosis  Treatment Diagnosis: oropharyngeal dysphagia (oropharyngeal dysphagia)  Treatment Diagnosis 2: cues provided with cheikh wolf  Physician: Naga Arora MD  Precautions: Fall (blind L eye, monitor ortho BPs)  Mental Status:  Mental Status  Neurologic State: Alert, Eyes open to voice  Orientation Level: Oriented to person  Cognition: Follows commands, Recognition of people/places  Perception: Cues to attend left visual field, Verbal, Visual, Tactile  Safety/Judgement: Decreased insight into deficits      OBJECTIVE DATA SUMMARY:   Treatment & Interventions: Motor Speech:     Language Comprehension and Expression:  Auditory Comprehension  Cueing type: Multi modality  Cueing amount: Moderate  Verbal Expression  Primary Mode of Expression: Verbal  Naming: Impaired  Pictures (%): 40 %  Naming cueing type: Multi modality  Naming cueing amount: Moderate        Neuro-Linguistics:  See Below: Pt alert and responsive to cues to position her head to the left to observe stimuli. Glasses worn and patient multi modality cues was able to name common pictures of objects. Patient tends to say \"I don't know\" however when cued is able to either spontaneously identify or imitate picture names Responses to yes/no questions were averaged to approximately 70% consistency this day. Patient followed one step directions with 100% accuracy for structured language tasks and also when receiving therapeutic breakfast tray. Patient consumed 20% of pureed solids and over 90% of NT liquids via cup without any overt s/s of aspiration when using safe swallow strategies . Pragmatics:     Voice:        Response & Tolerance to Activities: Good response this day and patients grandson in attendance.      Pain:           ASSESSMENT:  Progression toward goals:  [ ]         Improving appropriately and progressing toward goals  [X] Improving slowly and progressing toward goals  [ ]         Not making progress toward goals and plan of care will be adjusted       PLAN:  Recommendations and Planned Interventions:     Patient continues to benefit from skilled intervention to address the above impairments. Continue treatment per established plan of care.   Discharge Recommendations:  TBD       COMMUNICATION/EDUCATION:     [X]        Safety precautions  [ ]        Compensatory communication techniques  [X]        Internal/external memory techniques        Treatment Time: 62 Minutes     Therapist:    FCO Kate        9/19/2017

## 2017-09-19 NOTE — PROGRESS NOTES
Post Fall Documentation      Edward Escobar unwitnessed fall occurred on 9/19/2017 at 1927 . The answers to the following questions summarize the fall:   I was trying to go to the Bathroom and get in her own bed, confused to place and time. Nurses heard a yell form her room, went to room and patient was laying on the floor, alarm attached to her. On back on floor near the door, no bruising noted at this time. , vital signs, returned to bed with fall alarm, side rails up  · Dr. Rowena Potter notify,  and grand son and supervisor notified.         Rondall Boas, RN

## 2017-09-19 NOTE — PROGRESS NOTES
Problem: Self Care Deficits Care Plan (Adult)  Goal: *Acute Goals and Plan of Care (Insert Text)  OCCUPATIONAL THERAPY SHORT TERM GOALS   Initiated 9/7/2017 and to be accomplished within 2 Week(s)    1. Patient will perform Upper body ADLs utilizing hemitechnique with moderate assistance . 2. Patient will perform Lower body ADLs utilizing hemitechnique & adaptive equipment with maximal assistance . 3. Patient will perform toileting task with maximal assistance x 2 person assist with Fair safety to reduce falls risk. 4. Patient will perform functional transfers with Rolling Walker and moderate assistance x 2 person assist.  5. Patient will perform standing static/dynamic balance activities for improved ADL/IADL function with maximal assistance x 2 person assist and Fair balance and safety awarenes. 6. Patient will improve Barthel index scores to a tleast 20/100 to improve functional mobility. 7. Patient will perform self feeding tasks with Mod A utilizining adaptive equipment and compensatory techniques. 8. Patient will increase left sided awareness with min verbal and tactile cues for ADL performance skills and functional transfers. OCCUPATIONAL THERAPY LONG TERM GOALS   Initiated 9/7/2017 and to be accomplished within 4 Week(s)    1. Patient will perform Upper body ADLs with/without adaptive equipment with supervision/set-up. 2. Patient will perform Lower body ADLs with/without adaptive equipment with supervision/set-up . 3. Patient will perform toileting task with supervision/set-up with Good safety to reduce falls risk. 4. Patient will perform functional transfers with Rolling Walker and supervision/set-up and Good balance and safety awareness. 5. Patient will perform standing static/dynamic activity for improved ADL/IADL function with supervision/set-up an and Good balance and safety awareness.   6. Patient will improve Barthel index score to 50/100 to improve independence with mobility. Therapist: Lennie Oreilly 9/7/2017   [facility]   OCCUPATIONAL THERAPY DAILY TREATMENT NOTE        Patient: Herman Mccollum (89 y.o. female)                   Date: 9/19/2017  Attending Physician: Tami Hicks MD  Primary Diagnosis: HTN    Treatment Diagnosis  Treatment Diagnosis: oropharyngeal dysphagia (oropharyngeal dysphagia)  Treatment Diagnosis 2: cognitive-linguistic deficit   Precautions : Precautions at Admission: Fall (blind L eye, monitor ortho BPs)  Vital Signs:        Cognitive Status:  Mental Status  Neurologic State: Alert  Orientation Level: Oriented to person  Cognition: Decreased attention/concentration  Pain:        Gross Assessment:     Coordination:     Bed Mobility:  Bed Mobility  Supine to Sit: Minimum assistance; Additional time  Scooting: Minimum assistance; Additional time  Transfers:  Functional Transfers  Sit to Stand: Minimum assistance;Assist x2; Additional time  Stand to Sit: Moderate assistance;Assist x2     Balance:  Balance  Sitting: With support; Impaired  Sitting - Static: Prop sitting  Sitting - Dynamic: Poor (constant support)  Standing: Impaired;Pull to stand; With support  Standing - Static: Constant support;Poor  Standing - Dynamic : Poor  Therapeutic Activities:  Co-treated with PT for optimal functional gains with static standing balance and functional transfers needed for ADL tasks. Assisted patient with bed mobility and bed <>w/c transfers in order to assess safety and independence during task. See above for levels of A needed. Patient demonstrated increased independence and performance with bed mobility and static standing today and able to complete with Min A x 2. Patient also able to complete SPT with Min A x 2 with cueing for L UE and LE due to neglect as well as RW management and safety awareness. Assisted patient with w/c positioning to increase upright posture and comfort needed for increased independence with ADL tasks and transfers. Practiced functional reach activity with mirror feedback in order to increase Bilateral hand coordination as well as increased L UE mobility and L side neglect needed for ADL tasks. Patient required cueing to increase visual scanning towards L for optimal independence. Patient/Caregiver Education:    Yovani Gonzalez Education on see above.         ASSESSMENT:  Patient continues to demonstrate the need for skilled Occupational Therapy services to improve L UE mobility needed for upper body dressing  Progression toward goals:  [ ]      Improving appropriately and progressing toward goals  [X]      Improving slowly and progressing toward goals  [ ]      Not making progress toward goals and plan of care will be adjusted      Treatment session:   46 minutes     Therapist:    CATHERINE Anderson,  9/19/2017

## 2017-09-20 NOTE — PROGRESS NOTES
SW spoke with Ruth Funk at [facility] and pt has been accepted for admissions. She agreed to call pt's son and call SW back. SW received a return call from [address] [place] [address] that she spoke with pt's son and he would like for her to be transferred to the facility on 9/22/17.

## 2017-09-20 NOTE — PROGRESS NOTES
Problem: Self Care Deficits Care Plan (Adult)  Goal: *Acute Goals and Plan of Care (Insert Text)  OCCUPATIONAL THERAPY SHORT TERM GOALS   Initiated 9/7/2017 and to be accomplished within 2 Week(s)-updated 9/20/17    1. Patient will perform Upper body ADLs with minimal assistance. 2. Patient will perform Lower body ADLs utilizing adaptive equipment as needed with maximal assistance . 3. Patient will perform toileting task with maximal assistance x 2 person assist with Fair safety to reduce falls risk. 4. Patient will perform functional transfers with Rolling Walker and cga x 2 person assist.  5. Patient will perform standing static/dynamic balance activities for improved ADL/IADL function with Min A - CGA x 2 person assist and Fair balance and safety awarenes. 6. Patient will improve Barthel index scores to a tleast 20/100 to improve functional mobility. 7. Patient will perform self feeding tasks with w/ Mod I utilizing adaptive equipment and compensatory techniques. 8. Patient will participate in upper extremity therapeutic exercise/activities with SBA for 8 minutes to increase BUE strength for ADLs. OCCUPATIONAL THERAPY SHORT TERM GOALS   Initiated 9/7/2017 and to be accomplished within 2 Week(s)-updated 9/20/17    1. Patient will perform Upper body ADLs utilizing hemitechnique with moderate assistance-met goal/ugrade  2. Patient will perform Lower body ADLs utilizing hemitechnique & adaptive equipment with maximal assistance . Updated/progressing e.g. cga don socks w/ compensatory techniques and dep w/ doff/don adult brief  3. Patient will perform toileting task with maximal assistance x 2 person assist with Fair safety to reduce falls risk.-progressing  4. Patient will perform functional transfers with Rolling Walker and moderate assistance x 2 person assist.-met goal/upgrade   5.  Patient will perform standing static/dynamic balance activities for improved ADL/IADL function with maximal assistance x 2 person assist and Fair balance and safety awareness. Met goal/upgrade  6. Patient will improve Barthel index scores to a tleast 20/100 to improve functional mobility. -progressing  7. Patient will perform self feeding tasks with Mod A utilizining adaptive equipment and compensatory techniques. - met goal/upgrade  8. Patient will increase left sided awareness with min verbal and tactile cues for ADL performance skills and functional transfers.-met goal/d/c goal      OCCUPATIONAL THERAPY SHORT TERM GOALS   Initiated 9/7/2017 and to be accomplished within 2 Week(s)    1. Patient will perform Upper body ADLs utilizing hemitechnique with moderate assistance . 2. Patient will perform Lower body ADLs utilizing hemitechnique & adaptive equipment with maximal assistance . 3. Patient will perform toileting task with maximal assistance x 2 person assist with Fair safety to reduce falls risk. 4. Patient will perform functional transfers with Rolling Walker and moderate assistance x 2 person assist.  5. Patient will perform standing static/dynamic balance activities for improved ADL/IADL function with maximal assistance x 2 person assist and Fair balance and safety awarenes. 6. Patient will improve Barthel index scores to a tleast 20/100 to improve functional mobility. 7. Patient will perform self feeding tasks with Mod A utilizining adaptive equipment and compensatory techniques. 8. Patient will increase left sided awareness with min verbal and tactile cues for ADL performance skills and functional transfers. OCCUPATIONAL THERAPY LONG TERM GOALS   Initiated 9/7/2017 and to be accomplished within 4 Week(s)    1. Patient will perform Upper body ADLs with/without adaptive equipment with supervision/set-up. 2. Patient will perform Lower body ADLs with/without adaptive equipment with supervision/set-up . 3.  Patient will perform toileting task with supervision/set-up with Good safety to reduce falls risk.   4. Patient will perform functional transfers with Rolling Walker and supervision/set-up and Good balance and safety awareness. 5. Patient will perform standing static/dynamic activity for improved ADL/IADL function with supervision/set-up an and Good balance and safety awareness. 6. Patient will improve Barthel index score to 50/100 to improve independence with mobility. Therapist: Dejan Arias 9/7/2017   [facility] (formerly [facility])  OCCUPATIONAL THERAPY WEEKLY SUMMARY   Reporting period:  from 9/7/17 through 9/20/17         Patient: Kristin Santos (82 y.o. female)                          Date: 9/20/2017    Primary Diagnosis: HTN                            Attending Physician: Jacek Tenorio MD Treatment Diagnosis  Treatment Diagnosis: oropharyngeal dysphagia (oropharyngeal dysphagia)  Treatment Diagnosis 2: cues provided with p.o. 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 Using a comparative statement, summarize significant progress toward goals as a result of skilled intervention provided:  Patient has made Good progress towards their Occupational Therapy goals in the following areas:   self-feeding, grooming, bathing, upper body dressing, lower body dressing, functional mobility and L sided awareness.   Identify remaining functional areas, impairments limiting progress and/or barriers to improvement:  Skilled Occupational services were provided utilizing ADL retraining, balance retraining, instruction on safety awareness and energy conservation techniques, strengthening, visual perceptual motor skills and UE gross and fine motor skills to improve ADL performance skills and functional mobility. OBJECTIVE DATA SUMMARY:          INITIAL ASSESSMENT WEEKLY PROGRESS   COGNITIVE STATUS: COGNITIVE STATUS:   Neurologic State: Confused  Orientation Level: Oriented to person  Cognition: Follows commands  Perception: Cues to attend left visual field, Verbal, Visual, Tactile  Perseveration: No perseveration noted  Safety/Judgement: Decreased insight into deficits Neurologic State: Confused  Orientation Level: Oriented to person  Cognition: Poor safety awareness  Perception: Cues to attend left visual field, Cues to attend to left side of body  Perseveration: No perseveration noted  Safety/Judgement: Fall prevention, Decreased awareness of environment   PAIN: PAIN:   Pain Scale 1: Numeric (0 - 10)  Pain Intensity 1: 0  Pain Onset 1: now  Pain Location 1: Head  Pain Orientation 1: Anterior  Pain Description 1: Aching  Pain Intervention(s) 1: Medication (see MAR), Distraction, Repositioned, Rest  Patient Stated Pain Goal: 0  Pain Reassessment 1: Other (comment) (no)  Additional Pain Sites:  (no)       Pain Scale 1: Numeric (0 - 10)  Pain Intensity 1: 0  Pain Onset 1: headache  Pain Location 1: Head  Pain Orientation 1: Anterior  Pain Description 1: Aching  Pain Intervention(s) 1: Medication (see MAR)  Patient Stated Pain Goal: 0  Pain Reassessment 1: Yes  Additional Pain Sites:  (no)   BED MOBILITY BED MOBILITY   Rolling: Contact guard assistance, Minimum assistance  Supine to Sit: Moderate assistance, Additional time, Assist x1  Sit to Supine: Maximum assistance, Additional time, Assist x2  Scooting: Maximum assistance Rolling: Minimum assistance  Supine to Sit: Minimum assistance, Additional time  Sit to Supine:  Moderate assistance  Scooting: Minimum assistance, Additional time   ADL SELF CARE ADL SELF CARE     Bathing Assistance: Stand-by assistance  Position Performed: Long sitting on bed     Dressing Assistance: Maximum assistance  Hospital Gown: Moderate assistance     Bathing Assistance: Maximum assistance     Toileting Assistance: Total assistance(dependent)  Bladder Hygiene: Total assistance (dependent)  Bowel Hygiene: Total assistance (dependent)  Clothing Management: Total assistance (dependent)     Grooming Assistance: Maximum assistance  Washing Face: Stand-by assistance  Brushing Teeth: Contact guard assistance (adaptive suction toothbrush)  Brushing/Combing Hair: Stand-by assistance     Dressing Assistance: Total assistance(dependent), Maximum assistance  Socks: Contact guard assistance  Leg Crossed Method Used: Yes  Position Performed: Long sitting on bed     Feeding Assistance: Moderate assistance, Maximum assistance  Cutting Food: Total assistance (dependent)  Utensil Management: Stand-by assistance  Food to Mouth: Stand-by assistance  Drink to Mouth: Contact guard assistance  Cues: Verbal cues provided, Visual cues provided  Adaptive Equipment: Built up fork, Built up spoon, Cup with lid and handle (2 HANDLE CUP AND LID REMOVED)   TRANSFERS TRANSFERS   Sit to Stand:  (witheld)  Stand to Sit: Maximum assistance Sit to Stand: Minimum assistance, Assist x2, Additional time  Stand to Sit: Moderate assistance, Assist x2         BALANCE BALANCE   Sitting: Impaired  Sitting - Static: Poor (constant support) (pt. leans heavily towards L; c/o dizziness)  Sitting - Dynamic: Poor (constant support)  Standing:  (nt due to pt.  drowsy)  Standing - Static: Poor  Standing - Dynamic : Poor Sitting: With support, Impaired  Sitting - Static: Prop sitting  Sitting - Dynamic: Poor (constant support)  Standing: Impaired, Pull to stand, With support  Standing - Static: Constant support, Poor  Standing - Dynamic : Poor         GROSS ASSESSMENT  GROSS ASSESSMENT   AROM: Generally decreased, functional  PROM: Within functional limits  Strength: Generally decreased, functional  Coordination: Generally decreased, functional AROM: Generally decreased, functional  PROM: Within functional limits  Strength: Generally decreased, functional  Coordination: Generally decreased, functional   COORDINATION COORDINATION   Fine Motor Skills-Upper: Right Intact, Left Impaired  Gross Motor Skills-Upper: Right Intact, Left Impaired Fine Motor Skills-Upper: Left Impaired, Right Intact  Gross Motor Skills-Upper: Left Impaired, Right Intact   VISUAL/PERCEPTUAL VISUAL/PERCEPTUAL   Tracking: Requires cues, head turns, or add eye shifts to track, Unable to track left to  midline  Visual Fields:  (L sided neglect )   Tracking: Requires cues, head turns, or add eye shifts to track, Unable to track left to  midline  Visual Fields:  (L sided neglect )     AUDITORY: AUDITORY:   Auditory Impairment: None Auditory Impairment: None         INSTRUMENTAL  ADL'S:    INSTRUMENTAL ADL'S:            THE BARTHEL INDEX  ACTIVITY    SCORE   FEEDING  0=unable  5=needs help cutting,spreading butter,etc., or modified diet  10= independent    5   BATHING  0=dependent  5=independent (or in shower 0   GROOMING  0=needs help  5=independent face/hair/teeth/shaving (implements provided) 0   DRESSING  0=dependent  5=needs help but can do about half unaided  10=independent(including buttons, zips,laces etc.) 5   BOWELS  0=incontinent  5=occasional accident  10=continent 0   BLADDER  0=incontinent, or catheterized and unable to manage alone  5=occasional accident  10=continent 0   TOILET USE  0=dependent  5=needs some help, but can do something alone  10=independent (on and off, dressing, wiping) 0   TRANSFER (BED TO CHAIR AND BACK)  0=unable, no sitting balance  5=major help(one or two people,physical), can sit  10=minor help(verbal or physical)  15=independent    5   MOBILITY (ON LEVEL SURFACES)  0=immobile or <50 yards  5=wheelchair independent,including corners,>50 yards  10=walkes with help of one person (verbal or physical) >50 yards  15=independent(but may use any aid; for example, stick) >50 yards 0   STAIRS  0=unable  5=needs help (verbal, physical, carrying aid)  10=independent 0             TOTAL:                  10/100      Treatment:  See above for ADL routine e.g. UB bathe & dress, grooming tasks, bed mobility & functional mobility. Discussed progress towards goals with patient and family for client centered approach. Patient practiced w/ repetition and provided correct return demonstration for use of call bell to request assist for all functional transfers in order to prevent fall. Patient's son, Mike Herzog to bring in day time wear clothing in order to address ADL dressing tasks w/ AE and compensatory techniques. Patient's response to Treatment rendered:  good     Patient expected Discharge Location:  [X]Private Residence  [ ] [facility]/IL[facility]  [ [facility]  [ ]Other:     Plan: Continue OT services as established on the Plan of Care for 5 times a week.      Treatment Minutes:  43  OT and Assistant have had a weekly case conference regarding the above treatment:  [X] Yes     [ ] No    Therapist:   Medina Ojeda,         Date:9/20/2017      Forward to OT for co-signature when completed

## 2017-09-20 NOTE — ROUTINE PROCESS
Bedside and Verbal shift change report given to Al Aguilar (oncoming nurse) by Roz Culver RN (offgoing nurse). Report included the following information SBAR, Kardex and MAR.  24 hour chart check completed, pt visually checked q 1 hour by nursing staff

## 2017-09-20 NOTE — PROGRESS NOTES
GIM     Patient: Brenton Felix MRN: [id number]  CSN: [id number]    YOB: 1925  Age: 80 y.o. Sex: female    DOA: 9/6/2017 LOS:  LOS: 14 days                    Subjective:     Much better and brighter. Starting to [facility] ambulate. Eyes midline. Swallowing better. Discussed at length with faimly    Objective:      Visit Vitals    /82    Pulse 77    Temp 98.4 °F (36.9 °C)    Resp 18    Ht 5' 4\" (1.626 m)    Wt 71.2 kg (157 lb)    SpO2 96%    Breastfeeding No    BMI 26.95 kg/m2       Physical Exam:  Chest clear  Cor rr  abd soft  No edema  L hemiparesis persists    Intake and Output:  Current Shift:     Last three shifts:       No results found for this or any previous visit (from the past 24 hour(s)).     Current Facility-Administered Medications   Medication Dose Route Frequency    morphine (ROXANOL) 100 mg/5 mL (20 mg/mL) concentrated solution 2.6 mg  2.6 mg Oral Q4H PRN    [facility] ANESTHESIA   Other PRN    [facility] EMERGENCY/STAT BOX   Other PRN    acetaminophen (TYLENOL) tablet 650 mg  650 mg Oral Q4H PRN    aspirin (ASPIRIN) tablet 325 mg  325 mg Oral DAILY    atorvastatin (LIPITOR) tablet 80 mg  80 mg Oral QHS    clopidogrel (PLAVIX) tablet 75 mg  75 mg Oral DAILY    glucose chewable tablet 16 g  4 Tab Oral PRN    glucagon (GLUCAGEN) injection 1 mg  1 mg IntraMUSCular PRN    dextrose (D50W) injection syrg 12.5-25 g  25-50 mL IntraVENous PRN       Lab Results   Component Value Date/Time    Glucose 114 09/19/2017 08:10 AM    Glucose 110 09/15/2017 05:30 AM    Glucose 93 09/13/2017 04:32 AM    Glucose 129 09/12/2017 07:00 AM    Glucose 127 09/11/2017 02:20 PM        Assessment/Plan     Active Problems:    Debility (9/7/2017)      Altered mental status (9/7/2017)      CKD (chronic kidney disease) stage 3, GFR 30-59 ml/min (9/7/2017)        Joseline Soto MD  9/20/2017, 11:27 AM

## 2017-09-20 NOTE — PROGRESS NOTES
Problem: Mobility Impaired (Adult and Pediatric)  Goal: *Acute Goals and Plan of Care (Insert Text)  PHYSICAL THERAPY STG GOALS :  Initiated 9/7/2017 and to be accomplished within 2 Weeks (Updated 9/20/17)     1. Patient will move from supine to sit and sit to supine , scoot up and down and roll side to side in bed with minimal assistance/contact guard assist. (Progressing; Min A with additional time and verbal, tactile and occasional manual cues)   2. Patient will transfer from bed to chair and chair to bed with minimal assistance/contact guard assist using RW. (Progressing; Min A x2 with step-by-step instructions)   3. Patient will perform sit to stand with minimal assistance/contact guard assist with Fair balance and safety awareness. (Progressing; Min A with pull-to-stand)   4. Patient will ambulate with moderate assistance for 25 feet with RW on level surfaces with 2 turns. (Progressing; 5ft with Mod A at // with close w/c follow for safety and step-by-step instructions)     PHYSICAL THERAPY STG GOALS :  Initiated 9/7/2017 and to be accomplished within 2 Weeks (Updated 9/13/17)     1. Patient will move from supine to sit and sit to supine , scoot up and down and roll side to side in bed with minimal assistance/contact guard assist. (Maintaining at Max A)   2. Patient will transfer from bed to chair and chair to bed with minimal assistance/contact guard assist using RW. (Maintaining at Total A X2)   3. Patient will perform sit to stand with minimal assistance/contact guard assist with Fair balance and safety awareness. (Maintaining at Total A x2)   4. Patient will ambulate with moderate assistance for 25 feet with RW on level surfaces with 2 turns. (NT, pt unable at this time)     PHYSICAL THERAPY LTG GOALS :  Initiated 9/7/2017 and to be accomplished within 6 Weeks    1. Patient will move from supine to sit and sit to supine , scoot up and down and roll side to side in bed with supervision/set-up.    2. Patient will transfer from bed to chair and chair to bed with supervision/set-up using RW. 3. Patient will perform sit to stand with RW supervision/set-up with Good balance and safety awareness. 4. Patient will ambulate with supervision/set-up for 100 feet with RW on level surfaces and be able to maneuver through narrow spaces and obstacles without loss of balance. 5. Patient will ascend/descend TBA. Geoffrey Dunham Physical Therapist: Toñito Kelley PT on 9/7/2017    [facility]   PHYSICAL THERAPY WEEKLY PROGRESS REPORT  Reporting Period:  Date:  9/7/17 to 9/20/17        Patient: Flakita Sharma (80 y.o. female)                         Date: 9/20/2017    Primary Diagnosis: HTN                Attending Physician: Elida Zacarias MD   Treatment Diagnosis  Treatment Diagnosis: oropharyngeal dysphagia (oropharyngeal dysphagia)  Treatment Diagnosis 2: cues provided with p.o. 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             Using a comparative statement, summarize significant progress toward goals as a result of skilled intervention provided:  Patient has made Fair progress towards their Physical Therapy goals in the areas of bed mobility, sit<>stand and transfers   Identify remaining functional areas, impairments limiting progress and/or barriers to improvement:  Patient would benefit from continues PT services to address the following functional deficits in strength, balance, core control, transfers and gait. Pt is limited by poor vision, L side weakness and levels of confusion.        OBJECTIVE DATA SUMMARY:       INITIAL ASSESSMENT WEEKLY ASSESSMENT   COGNITIVE STATUS COGNITIVE STATUS   Neurologic State: Confused  Orientation Level: Oriented to person  Cognition: Follows commands  Perception: Cues to attend left visual field, Verbal, Visual, Tactile  Perseveration: No perseveration noted  Safety/Judgement: Decreased insight into deficits Neurologic State: Confused  Orientation Level: Oriented to person  Cognition: Poor safety awareness  Perception: Cues to attend left visual field, Cues to attend to left side of body  Perseveration: No perseveration noted  Safety/Judgement: Fall prevention, Decreased awareness of environment   PAIN PAIN   Pain Scale 1: Numeric (0 - 10)  Pain Intensity 1: 0  Pain Onset 1: now  Pain Location 1: Head  Pain Orientation 1: Anterior  Pain Description 1: Aching  Pain Intervention(s) 1: Medication (see MAR), Distraction, Repositioned, Rest  Patient Stated Pain Goal: 0  Pain Reassessment 1: Other (comment) (no)  Additional Pain Sites:  (no) Pain Scale 1: Numeric (0 - 10)  Pain Intensity 1: 0  Pain Onset 1: headache  Pain Location 1: Head  Pain Orientation 1: Anterior  Pain Description 1: Aching  Pain Intervention(s) 1: Medication (see MAR)  Patient Stated Pain Goal: 0  Pain Reassessment 1: Yes  Additional Pain Sites:  (no)   GROSS ASSESSMENT GROSS ASSESSMENT   AROM: Generally decreased, functional  PROM: Within functional limits  Strength: Generally decreased, functional  Coordination: Generally decreased, functional AROM: Generally decreased, functional  PROM: Within functional limits  Strength: Generally decreased, functional  Coordination: Generally decreased, functional   BED MOBILITY BED MOBILITY   Rolling: Contact guard assistance, Minimum assistance  Supine to Sit: Moderate assistance, Additional time, Assist x1  Sit to Supine: Maximum assistance, Additional time, Assist x2  Scooting: Maximum assistance Rolling: Minimum assistance  Supine to Sit: Minimum assistance, Additional time  Sit to Supine: Moderate assistance  Scooting: Minimum assistance, Additional time   GAIT GAIT   Base of Support: Center of gravity altered  Speed/Alfreda: Delayed, Pace decreased (<100 feet/min), Shuffled  Step Length: Right shortened, Left shortened  Gait Abnormalities: Decreased step clearance, Step to gait, Shuffling gait  Ambulation - Level of Assistance: Moderate assistance, Additional time, Assist x1  Distance (ft): 5 Feet (ft)  Assistive Device: Other (comment) (at // with B UE support )  Interventions: Safety awareness training, Verbal cues, Tactile cues, Manual cues, Visual/Demos Base of Support: Center of gravity altered  Speed/Alfreda: Delayed, Pace decreased (<100 feet/min), Shuffled  Step Length: Right shortened, Left shortened  Gait Abnormalities: Decreased step clearance, Step to gait, Shuffling gait  Ambulation - Level of Assistance: Moderate assistance, Additional time, Assist x1  Distance (ft): 5 Feet (ft)  Assistive Device: Other (comment) (at // with B UE support )  Interventions: Safety awareness training, Verbal cues, Tactile cues, Manual cues, Visual/Demos    (NT; pt. unable to tolerate)  (NT; pt. unable to tolerate)               TRANSFERS TRANSFERS   Sit to Stand:  (witheld)  Stand to Sit: Maximum assistance  Bed to Chair: Minimum assistance, Assist x2 Sit to Stand: Minimum assistance, Assist x2  Stand to Sit: Minimum assistance, Assist x2  Bed to Chair: Minimum assistance, Assist x2   BALANCE BALANCE   Sitting: Impaired  Sitting - Static: Poor (constant support) (pt. leans heavily towards L; c/o dizziness)  Sitting - Dynamic: Poor (constant support)  Standing:  (nt due to pt.  drowsy)  Standing - Static: Poor  Standing - Dynamic : Poor Sitting: With support, Impaired  Sitting - Static: Prop sitting  Sitting - Dynamic: Poor (constant support)  Standing: Impaired, Pull to stand, With support  Standing - Static: Poor, Constant support  Standing - Dynamic : Poor   WHEELCHAIR MOBILITY/MGMT WHEELCHAIR MOBILITY/MGMT         Activity Tolerance:  Fair Activity Tolerance: Fair   Visual/Perceptual   Tracking: Requires cues, head turns, or add eye shifts to track, Unable to track left to  midline  Visual Fields:  (L sided neglect )        Visual/Perceptual   Vision  Tracking: Requires cues, head turns, or add eye shifts to track, Unable to track left to  midline  Visual Fields:  (L sided neglect )         Auditory:   Auditory Impairment: None      Auditory:   Auditory  Auditory Impairment: None                      Treatment:   Pt presented supine in bed. Pt able to complete supine>sit with Min A for upper body with use of bed rails and verbal cues for technique. Pt able to assist with scooting at EOB with Min A and verbal cues. Sit>stand from EOB with Min A x2 with pull-to stand at [facility] Stand-step transfer with RW to w/c with Min A and single step instructions for sequencing and assistance with advancing RW. Sit<>stand from w/c at [facility] x2 with Min A with pull-to-stand. Mirror placed in front of pt for visual reference to promote upright posture and to look up. Pt stood for ~30\" each time with verbal cues for upright posture. Seated dynamic balance challenged with reaching forward and lateral (L>R) to improved trunk control and use of L UE. Seated B LE TE rendered to promote strength and endurance for improved functional mobility: HR/TR, LAQ, ball squeezes, hip flexion, resisted hip abd (manual resistance) 2x10 with verbal and visual cues for proper technique and to stay on task. Patient's response to treatment rendered:  Fair      Patient expected Discharge Location:  [X]Private Residence  [ ] [facility]  [ [facility]  [ ]Other:     Plan: Continue Skilled PT services as established by the Plan of Care for 5-6 times a week.      PT and Assistant have had a weekly case conference regarding the above treatment:  [X] Yes     [ ] No        Treatment session:  38 minutes. Therapist: Brant Pike PTA       Date:9/20/2017  Forward to PT for co-signature when completed.

## 2017-09-20 NOTE — PROGRESS NOTES
Sw spoke with pt's son  Froy Martin while rounding with the MD and he informed SW that he would like for SW to send a referral to [facility] for placement. SW had pt's son to sign the consent to fax medical information. JANENE faxed the referral to Teena Dumont at [facility] as requested.

## 2017-09-20 NOTE — PROGRESS NOTES
Nutrition follow up-[facility]/  Plan of care      RECOMMENDATIONS:     1. Mech Soft diet; chopped meats; NTL  2. SF CIB TID- [place] thick  3. Monitor weight, labs and PO intake  4. RD to follow     GOALS:     1. Ongoing: PO intake meets >75% of protein/calorie needs by 9/27  2. Ongoing: Weight Maintenance (+/- 1-2 lb by 9/27)     ASSESSMENT:     Weight status is classified as overweight per BMI of 26.9. However, weight appropriate for age. PO intake is poor. Continue SF CIB TID for additional calories/protein. New weight n/a on record. Labs noted. Nutrition recommendations listed. RD to follow. Nutrition Risk:  [] High  [x] Moderate []  Low    SUBJECTIVE/OBJECTIVE:      Transferred from  Neuro to [facility] on 9/6/17. Admitted with CVA. S/P MBS on 9/12. Per SLP, patient with moderate oropharyngeal dysphagia and [facility] and NTL. Patient minimally verbal stating \" yes, thank you\" to any questions asked. Spoke to son who reported patient is not eating much of meal trays but drinks 2 CIB supplements throughout the day in addition to water and juice. Observed less than 10% of lunch tray and 50% of CIB supplement during visit. Encouraged adequate intake of meals and supplements to meet nutrition needs. Will monitor.     Information Obtained from:    [x] Chart Review   [x] Patient   [x] Family/Caregiver   [] Nurse/Physician   [x] Interdisciplinary Meeting/Rounds    Diet:Dental Soft diet; chopped meats; NTL  Medications: [x] Reviewed    Allergies: [x] Reviewed   Patient Active Problem List   Diagnosis Code    HTN (hypertension) I10    GERD (gastroesophageal reflux disease) K21.9    Pure hypercholesterolemia E78.00    Dementia F03.90    Advance directive in chart Z78.9    CVA (cerebral vascular accident) ([facility] [address] I63.9    Debility R53.81    Altered mental status R41.82    CKD (chronic kidney disease) stage 3, GFR 30-59 ml/min N18.3     Past Medical History:   Diagnosis Date    Advance directive in chart 7/25/2016    Gastrointestinal disorder     GERD (gastroesophageal reflux disease)     GERD (gastroesophageal reflux disease) 11/8/2012    HTN (hypertension) 11/8/2012    Hypercholesterolemia     Hypertension     Pure hypercholesterolemia 11/8/2012      Labs:    Lab Results   Component Value Date/Time    Sodium 143 09/19/2017 08:10 AM    Potassium 4.5 09/19/2017 08:10 AM    Chloride 110 09/19/2017 08:10 AM    CO2 25 09/19/2017 08:10 AM    Anion gap 8 09/19/2017 08:10 AM    Glucose 114 09/19/2017 08:10 AM    BUN 35 09/19/2017 08:10 AM    Creatinine 1.16 09/19/2017 08:10 AM    Calcium 9.0 09/19/2017 08:10 AM    Albumin 3.0 09/03/2017 11:20 AM     Anthropometrics: BMI (calculated): 26.9  Last 3 Recorded Weights in this Encounter    09/06/17 1630   Weight: 71.2 kg (157 lb)      Ht Readings from Last 1 Encounters:   09/06/17 5' 4\" (1.626 m)     No data found. IBW: 120 lb %IBW: 131%    Nutrition Needs:   Calories: 6281-5999 Kcal   Protein:   70-85 g     [x] No Cultural, Orthodoxy or ethnic dietary need identified.     [] Cultural, Orthodoxy and ethnic food preferences identified and addressed     Wt Status:  [] Normal (18.6 - 24.9) [] Underweight (<18.5) [x] Overweight (25 - 29.9) [] Mild Obesity (30 - 34.9)  [] Moderate Obesity (35 - 39.9) [] Morbid Obesity (40+)     Nutrition Problems Identified:   [x] Suboptimal PO intake   [] Food Allergies  [x] Difficulty chewing/swallowing/poor dentition  [] Constipation/Diarrhea   [] Nausea/Vomiting   [] None  [] Other:     Plan:   [] Therapeutic Diet  [x]  Obtained/adjusted food preferences/tolerances and/or snacks options   [x]  Continue supplements as prescribed  [x] Occupational therapy following for feeding techniques  []  HS snack added   [x]  Modify diet texture   []  Modify diet for food allergies   []  Assist with menu selection   [x]  Monitor PO intake on meal rounds   [x]  Continue inpatient monitoring and intervention   [x]  Participated in discharge planning/Interdisciplinary rounds/Team meetings   []  Other:     Education Needs:   [] Not appropriate for teaching at this time    [x] Identified and addressed     Nutrition Monitoring and Evaluation:  [x] Continue ongoing monitoring and intervention  [] Other    Beverely Cos

## 2017-09-20 NOTE — PROGRESS NOTES
Pt participated in [address] for the duration of 35 minutes. Pt son was present. With max prompting from RT pt was able to use fine motor skills to complete activity task. Pt interacted well with peers and RT.

## 2017-09-20 NOTE — PROGRESS NOTES
[facility] SPEECH THERAPY  WEEKLY PROGRESS NOTE    Reporting Period:  9/14/17  To 9/20/17       Patient: Victorino Holman (2 y.o. female)          Date: 9/20/2017    Primary Diagnosis: HTN    Attending Physician: Ashutosh Oquendo MDTreatment Diagnosis  Treatment Diagnosis: oropharyngeal dysphagia (oropharyngeal dysphagia)  Treatment Diagnosis 2: cues provided with p.o. inatke  Precautions: Fall (blind L eye, monitor ortho BPs)  Rehab Potential : Fair        Skilled intervention provided with clinical rationale (include individualized treatment techniques and standardized tests):   SLP provided skilled assessments of swallow fxn across various textures with multimodal cueing for utilization of compensatory swallow techniques for decreased aspiration risk. Cueing also provided for engagement in oropharyngeal/laryngeal strengthening exercises for improved tolerance of po intake for possible diet advancement for QOL. SLP completed skilled meal assessments and dietary modifications, as indicated, in order to determine safest, least-restrictive diet. Graded expressive communication tasks and those addressing executive function (specifically memory and visuospatial tasks completed for increased functionality and decreased level of dependency for daily activities. Using a comparative statement, summarize significant progress toward goals as a result of skilled intervention provided:  Pt making variable gains this recording period as evidenced by varying levels of therapy tolerance and participation across all tasks. Pt continues to tolerate puree/nectar-thick liquids; however, continues to exhibit poor po intake. Continues to require max cues of increased rotary manipulation for more effective oral bolus prep and transit. Swallow reflex latency continues >3 seconds. Benefits from multimodal cues for fast effortful swallow. Continues to present with both overt and silent s/s aspiration with thin liquids. Varying levels of L buccal cavity pocketing also requiring cues to lingual sweep. Decreased cmultimodal cueing provided for L scanning across varying written and safety tasks. Carryover with therapy minimal.     Identify remaining functional areas, impairments limiting progress and/or barriers to improvement:  Varying levels of alertness continues to impede carryover and progress. PO intake amount continues compromised and may impede nutritional intake. SLP continues education of pt/family on aspiration precautions and importance of compensatory swallow techniques to decrease aspiration risk (decrease rate of intake & sip/bite size, upright @HOB for all po intake and ~30 minutes after po, increased rotary manipulation, fast/effortful swallow, L lingual sweep); family verbalized comprehension and pt requires reinforcement.     OBJECTIVE DATA SUMMARY:     INITIAL ASSESSMENT CURRENT ASSESSMENT   Cognitive and Communication Status: Cognitive and Communication Status:   Neurologic State: Confused  Orientation Level: Oriented to person  Cognition: Follows commands  Perception: Cues to attend left visual field, Verbal, Visual, Tactile  Perseveration: No perseveration noted  Safety/Judgement: Decreased insight into deficits Neurologic State: Alert;Confused  Orientation Level: Oriented to person;Oriented to place  Cognition: Poor safety awareness  Perception: Cues to attend left visual field;Cues to attend to left side of body  Perseveration: No perseveration noted  Safety/Judgement: Fall prevention;Decreased awareness of environment   Pain Pain   Numeric (0 - 10) Pain Scale 1: Numeric (0 - 10)   0 Pain Intensity 1: 0   Head     Oral Assessment: Oral Assessment:   Labial: Left droop  Dentition: Limited, Natural  Oral Hygiene: Fair  Lingual: Decreased rate, Decreased strength  Velum: No impairment  Mandible: No impairment Oral Assessment  Labial: Left droop  Dentition: Limited;Natural  Oral Hygiene: Fair  Lingual: Decreased rate;Decreased strength  Velum: No impairment  Mandible: No impairment   P.O. Trials: P.O. Trials:   Neuromuscular Estim Used: No  Assessment Method(s): Observation, Palpation  Patient Position: [facility] 60  Vocal Quality: No impairment  Consistency Presented: Thin liquid, Nectar thick liquid, Puree, Mechanical soft  How Presented: Self-fed/presented, Straw, Successive swallows  How Much: 20  Bolus Acceptance: No impairment  Bolus Formation/Control: Impaired  Type of Impairment: Delayed, Mastication  Propulsion: Delayed (# of seconds), Discoordination  Oral Residue: 10-50% of bolus, Lingual  Initiation of Swallow: Delayed (# of seconds)  Laryngeal Elevation: Functional  Aspiration Signs/Symptoms: Clear throat, Watery eyes  Pharyngeal Phase Characteristics: Altered vocal quality, Poor endurance, Suspected pharyngeal residue  Effective Modifications: Small sips and bites  Cues for Modifications: Moderate PO Trials  Neuromuscular Estim Used: No  Assessment Method(s): Observation;Palpation  Patient Position: [facility] 60  Vocal Quality: No impairment  Consistency Presented: Thin liquid;Pudding  How Presented: Self-fed/presented;SLP-fed/presented; Successive swallows;Spoon;Straw  How Much: 10  Bolus Acceptance: No impairment  Bolus Formation/Control: Impaired  Type of Impairment: Delayed;Mastication; Anterior;Spillage  Propulsion: Delayed (# of seconds)  Oral Residue: Less than 10% of bolus; Left;Pocketing  Initiation of Swallow: Delayed (# of seconds)  Laryngeal Elevation: Decreased  Aspiration Signs/Symptoms: Weak cough;Delayed cough/throat clear  Pharyngeal Phase Characteristics: Poor endurance; Suspected pharyngeal residue  Effective Modifications: Small sips and bites  Cues for Modifications:  Moderate                     Findings and Recommendations: Findings and Recommendations:   Evaluation Type: Eval O/P Swallow  Assessment Method(s): Observation, Palpation  Patient Position: [facility] 60  Vocal Quality: No impairment  Consistency Presented: Thin liquid, Nectar thick liquid, Puree, Mechanical soft  How Presented: Self-fed/presented, Straw, Successive swallows  Bolus Acceptance: No impairment  Bolus Formation/Control: Impaired  Type of Impairment: Delayed, Mastication  Propulsion: Delayed (# of seconds), Discoordination  Oral Residue: 10-50% of bolus, Lingual  Oral Phase Severity: Moderate  Pharyngeal Phase Severity : Moderate  Initiation of Swallow: Delayed (# of seconds)  Laryngeal Elevation: Functional  Aspiration Signs/Symptoms: Clear throat, Watery eyes Evaluation Type: Eval O/P Swallow  Assessment Method(s): Observation;Palpation  Patient Position: [facility] 60  Vocal Quality: No impairment  Consistency Presented: Thin liquid; Nectar thick liquid;Puree;Mechanical soft  How Presented: Self-fed/presented;Straw;Successive swallows  Bolus Acceptance: No impairment  Bolus Formation/Control: Impaired  Type of Impairment: Delayed;Mastication  Propulsion: Delayed (# of seconds); Discoordination  Oral Residue: 10-50% of bolus; Lingual  Oral Phase Severity: Moderate  Pharyngeal Phase Severity : Moderate  Initiation of Swallow: Delayed (# of seconds)  Laryngeal Elevation: Functional  Aspiration Signs/Symptoms: Clear throat; Watery eyes     Goal or treatment changes with explanation & therapist recommendation:  Continue current treatment plan for facilitation of daily living activity completion and tolerance of safest, least restrictive diet for meeting nutritional needs as well as for QOL. Current/Updated STGs:  To be completed in 2-3 weeks  Pt will:  1. Complete visual-spatial tasks related to functional ADLs for safe, social reintegration with 70% acc, mod visual/verbal cues (currently 25%, max A)  2. Recall > 7 items per concrete category with mod visual/verbal cues in 4/5 trials (currently 3 items, max A)  3.  (GOAL APPROXIMATED: will d/c goal) Perform basic ADL/IADL problem solving tasks with 70% acc, with mod visual/verbal cues (currently 70% min-mod A)  4. (GOAL NOT APPROPRIATE: will d/c goal) Recall 3-5 words after delay/distraction increase recall abilities as related to community reintegration with mod visual/verbal cues in 3/5 trials (not addresse)  5. Tolerate mech-soft, thin liquid trials without overt s/sx aspiration/distress in 4/5 trials with min visual/verbal/tactile cues (currently teary eyes and weak delayed cough)  6. Utilize compensatory swallow maneuvers (decrease bolus size, decrease rate of intake, cyclical ingestion, etc.) to increase swallow function/safety with min visual, verbal and/or tactile cues in 4/5 trials. (max verbal cues)  7. Perform oral-motor/laryngeal strengthening exercises to increase swallow strength to decrease aspiration risk, min A (max A)  8. Complete an objective measure of swallow fxn (i.e., MBSS) to assess oropharyngeal anatomy/physiology for determination of safest least-restrictive diet and/or appropriate rehabilitation techniques. (met 9/12/17)    Expected Discharge Location:  []Private Residence  []  intermediate/ILF  [x]LTC  [x]Other: will require 24-hour care    Plan:  Continue skilled speech therapy intervention 1-2 times/day for 5 days/week to address goals.       Treatment Time:  33 minutes    Therapist: Milena Stock SLP       Date:9/20/2017

## 2017-09-21 NOTE — PROGRESS NOTES
[address]  [facility]   speech therapy Discharge Summary      Patient: Zabrina Arias (80 y.o. female)          Start of Care Date: 2017    Referred by : Reanna Rouse MD       Attending Physician: Reanna Rouse MD  Primary Diagnosis: HTN             Precautions: Fall (blind L eye, monitor ortho BPs)   Medical History:   Past Medical History:   Diagnosis Date    Advance directive in chart 2016    Gastrointestinal disorder     GERD (gastroesophageal reflux disease)     GERD (gastroesophageal reflux disease) 2012    HTN (hypertension) 2012    Hypercholesterolemia     Hypertension     Pure hypercholesterolemia 2012     Past Surgical History:   Procedure Laterality Date    HX GYN      HX ORTHOPAEDIC      Treatment Diagnosis  Treatment Diagnosis: oropharyngeal dysphagia   Treatment Diagnosis 2: L neglect    Reason for Discharge: []Goals Met   []Met highest potential  []  []Hospitalized  [x]Other: transfer to another SNF/LTC  Discharge Location:  []Private Residence  [] skilled nursing/ILF  [x]SNF/LTC  []Other:    Summarize skilled services provided and significant progress attained since last daily/weekly note (include individualized treatment techniques and standardized tests): This patient has received skilled speech therapy services from 17  through 17 and has made Fair progress towards their dysphagia goals. Improvements noted in improved tolerance of current puree/nectar-thick liquid diet provided mod verbal/tactile cues for increased rotary manipulation, fast effortful swallows, and L buccal cavity lingual sweep. Continues to exhibit s/s silent aspiration with thin liquid trials (teary eyes). Improvement noted with therapy tolerance for cognitive therapy with improved L scanning provided visual/verbal cues to incoming information on L side.      Summary of education/recommendation provided to Patient/Caregivers:    Educated pt and family, at length, on aspiration precautions and importance of compensatory swallow techniques to decrease aspiration risk (decrease rate of intake & sip/bite size, upright @HOB for all po intake and ~30 minutes after po); verbalized comprehension. OBJECTIVE DATA SUMMARY:     INITIAL ASSESSMENT CURRENT ASSESSMENT   COGNITIVE STATUS COGNITIVE STATUS   @flow(1977:first:1) @flow(1977::1)   PAIN PAIN   @flow(1346:first:1) @flow12(1346::1)   ORAL ASSESSMENT ORAL ASSESSMENT   @flow(3681:first:1) @flow(3681::1)   P. O. Trials P.O. Trials   Neuromuscular Estim Used: No  Assessment Method(s): Observation, Palpation  Patient Position: [facility] 60  Vocal Quality: No impairment  Consistency Presented: Thin liquid, Nectar thick liquid, Puree, Mechanical soft  How Presented: Self-fed/presented, Straw, Successive swallows  How Much: 20  Bolus Acceptance: No impairment  Bolus Formation/Control: Impaired  Type of Impairment: Delayed, Mastication  Propulsion: Delayed (# of seconds), Discoordination  Oral Residue: 10-50% of bolus, Lingual  Initiation of Swallow: Delayed (# of seconds)  Laryngeal Elevation: Functional  Aspiration Signs/Symptoms: Clear throat, Watery eyes  Pharyngeal Phase Characteristics: Altered vocal quality, Poor endurance, Suspected pharyngeal residue  Effective Modifications: Small sips and bites  Cues for Modifications: Moderate Neuromuscular Estim Used: No  Assessment Method(s): Observation, Palpation  Patient Position: [facility] 60  Vocal Quality: No impairment  Consistency Presented:  Thin liquid, Pudding  How Presented: Self-fed/presented, SLP-fed/presented, Successive swallows, Spoon, Straw  How Much: 10  Bolus Acceptance: No impairment  Bolus Formation/Control: Impaired  Type of Impairment: Delayed, Mastication, Anterior, Spillage  Propulsion: Delayed (# of seconds)  Oral Residue: Less than 10% of bolus, Left, Pocketing  Initiation of Swallow: Delayed (# of seconds)  Laryngeal Elevation: Decreased  Aspiration Signs/Symptoms: Weak cough, Delayed cough/throat clear  Pharyngeal Phase Characteristics: Poor endurance, Suspected pharyngeal residue  Effective Modifications: Small sips and bites  Cues for Modifications: Moderate                     Base of Support: Center of gravity altered  Speed/Alfreda: Delayed, Pace decreased (<100 feet/min), Shuffled  Step Length: Right shortened, Left shortened  Gait Abnormalities: Decreased step clearance, Step to gait, Shuffling gait  Ambulation - Level of Assistance: Moderate assistance, Additional time, Assist x1  Distance (ft): 5 Feet (ft)  Assistive Device: Other (comment) (at // with B UE support )  Interventions: Safety awareness training, Verbal cues, Tactile cues, Manual cues, Visual/Demos Base of Support: Center of gravity altered  Speed/Alfreda: Delayed, Pace decreased (<100 feet/min), Shuffled  Step Length: Right shortened, Left shortened  Gait Abnormalities: Decreased step clearance, Step to gait, Shuffling gait  Ambulation - Level of Assistance: Moderate assistance, Additional time, Assist x1  Distance (ft): 5 Feet (ft)  Assistive Device: Other (comment) (at // with B UE support )  Interventions: Safety awareness training, Verbal cues, Tactile cues, Manual cues, Visual/Demos   Findings and Recommendations Findings and Recommendations   Evaluation Type: Eval O/P Swallow  Assessment Method(s): Observation, Palpation  Patient Position: [facility] 60  Vocal Quality: No impairment  Consistency Presented:  Thin liquid, Nectar thick liquid, Puree, Mechanical soft  How Presented: Self-fed/presented, Straw, Successive swallows  Bolus Acceptance: No impairment  Bolus Formation/Control: Impaired  Type of Impairment: Delayed, Mastication  Propulsion: Delayed (# of seconds), Discoordination  Oral Residue: 10-50% of bolus, Lingual  Oral Phase Severity: Moderate  Pharyngeal Phase Severity : Moderate  Initiation of Swallow: Delayed (# of seconds)  Laryngeal Elevation: Functional  Aspiration Signs/Symptoms: Clear throat, Watery eyes Evaluation Type: Eval O/P Swallow  Assessment Method(s): Observation, Palpation  Patient Position: [facility] 60  Vocal Quality: No impairment  Consistency Presented: Thin liquid, Pudding  How Presented: Self-fed/presented, SLP-fed/presented, Successive swallows, Spoon, Straw  Bolus Acceptance: No impairment  Bolus Formation/Control: Impaired  Type of Impairment: Delayed, Mastication, Anterior, Spillage  Propulsion: Delayed (# of seconds)  Oral Residue: Less than 10% of bolus, Left, Pocketing  Oral Phase Severity: Moderate  Pharyngeal Phase Severity : Moderate-severe  Initiation of Swallow: Delayed (# of seconds)  Laryngeal Elevation: Decreased  Aspiration Signs/Symptoms: Weak cough, Delayed cough/throat clear      Short Term Goals: Pt will:  1. Complete visual-spatial tasks related to functional ADLs for safe, social reintegration with 70% acc, mod visual/verbal cues (currently 50%, max A) - NOT MET  2. Recall > 7 items per concrete category with mod visual/verbal cues in 4/5 trials (currently 6 items, min-mod A) - NOT MET  3. (GOAL APPROXIMATED: will d/c goal) Perform basic ADL/IADL problem solving tasks with 70% acc, with mod visual/verbal cues (currently 70% min-mod A)   4. (GOAL NOT APPROPRIATE: will d/c goal) Recall 3-5 words after delay/distraction increase recall abilities as related to community reintegration with mod visual/verbal cues in 3/5 trials (not addresse)  5. Tolerate mech-soft, thin liquid trials without overt s/sx aspiration/distress in 4/5 trials with min visual/verbal/tactile cues (currently teary eyes and weak delayed cough)  6. Utilize compensatory swallow maneuvers (decrease bolus size, decrease rate of intake, cyclical ingestion, etc.) to increase swallow function/safety with min visual, verbal and/or tactile cues in 4/5 trials. (max verbal cues) - NOT MET  7.  Perform oral-motor/laryngeal strengthening exercises to increase swallow strength to decrease aspiration risk, min A (max A) - NOT MET  8. Complete an objective measure of swallow fxn (i.e., MBSS) to assess oropharyngeal anatomy/physiology for determination of safest least-restrictive diet and/or appropriate rehabilitation techniques. (met 9/12/17)    Discharge Recommendations: Maximum therapeutic gains met; safest, least restrictive diet achieved in current setting. Pt to be d/c'd to another facility today; accordingly, SLP to d/c intervention at this time. Treatment Time:  38 minutes.     Therapist:      Geronimo Mcdowell SLP            Date:9/21/2017

## 2017-09-21 NOTE — PROGRESS NOTES
Problem: Self Care Deficits Care Plan (Adult)  Goal: *Acute Goals and Plan of Care (Insert Text)  OCCUPATIONAL THERAPY SHORT TERM GOALS     Initiated 9/7/2017 and to be accomplished within 2 Weeks- d/c 9/21/17    1. Patient will perform Upper body ADLs with minimal assistance. - goal not met, mod A  2. Patient will perform Lower body ADLs utilizing adaptive equipment as needed with maximal assistance. Goal not met, dep doff & don adult brief and cga don socks long sitting in bed and crossing leg across opposite  3. Patient will perform toileting task with maximal assistance x 2 person assist with Fair safety to reduce falls risk.-goal not met, max A - dep while lying in bed rolling R to L  4. Patient will perform functional transfers with Rolling Walker and cga x 2 person assist. -goal not met, min A x 2   5. Patient will perform standing static/dynamic balance activities for improved ADL/IADL function with Min A - CGA x 2 person assist and Fair balance and safety awarenes. 6. Patient will improve Barthel index scores to a tleast 20/100 to improve functional mobility. -goal not met, Min A x 2 w/ Fair- balance  7. Patient will perform self feeding tasks with w/ Mod I utilizing adaptive equipment and compensatory techniques.-goal not met, SBA-Supv  8. Patient will participate in upper extremity therapeutic exercise/activities with SBA for 8 minutes to increase BUE strength for ADLs. -goal not met, not addressed prior to d/c      OCCUPATIONAL THERAPY LONG TERM GOALS   Initiated 9/7/2017 and to be accomplished within 4 Week(s)- d/c 9/21/17    1. Patient will perform Upper body ADLs with/without adaptive equipment with supervision/set-up. -goal not met  2. Patient will perform Lower body ADLs with/without adaptive equipment with supervision/set-up . -goal not met  3. Patient will perform toileting task with supervision/set-up with Good safety to reduce falls risk. -goal not met  4.  Patient will perform functional transfers with Rolling Walker and supervision/set-up and Good balance and safety awareness. -goal not met  5. Patient will perform standing static/dynamic activity for improved ADL/IADL function with supervision/set-up an and Good balance and safety awareness. -goal not met  6. Patient will improve Barthel index score to 50/100 to improve independence with mobility.-goal not met      Initiated 9/7/2017 and to be accomplished within 2 Week(s)-updated 9/20/17    1. Patient will perform Upper body ADLs with minimal assistance. 2. Patient will perform Lower body ADLs utilizing adaptive equipment as needed with maximal assistance . 3. Patient will perform toileting task with maximal assistance x 2 person assist with Fair safety to reduce falls risk. 4. Patient will perform functional transfers with Rolling Walker and cga x 2 person assist.  5. Patient will perform standing static/dynamic balance activities for improved ADL/IADL function with Min A - CGA x 2 person assist and Fair balance and safety awarenes. 6. Patient will improve Barthel index scores to a tleast 20/100 to improve functional mobility. 7. Patient will perform self feeding tasks with w/ Mod I utilizing adaptive equipment and compensatory techniques. 8. Patient will participate in upper extremity therapeutic exercise/activities with SBA for 8 minutes to increase BUE strength for ADLs. OCCUPATIONAL THERAPY SHORT TERM GOALS   Initiated 9/7/2017 and to be accomplished within 2 Week(s)-updated 9/20/17    1. Patient will perform Upper body ADLs utilizing hemitechnique with moderate assistance-met goal/ugrade  2. Patient will perform Lower body ADLs utilizing hemitechnique & adaptive equipment with maximal assistance . Updated/progressing e.g. cga don socks w/ compensatory techniques and dep w/ doff/don adult brief  3.  Patient will perform toileting task with maximal assistance x 2 person assist with Fair safety to reduce falls risk.-progressing  4. Patient will perform functional transfers with Rolling Walker and moderate assistance x 2 person assist.-met goal/upgrade   5. Patient will perform standing static/dynamic balance activities for improved ADL/IADL function with maximal assistance x 2 person assist and Fair balance and safety awareness. Met goal/upgrade  6. Patient will improve Barthel index scores to a tleast 20/100 to improve functional mobility. -progressing  7. Patient will perform self feeding tasks with Mod A utilizining adaptive equipment and compensatory techniques. - met goal/upgrade  8. Patient will increase left sided awareness with min verbal and tactile cues for ADL performance skills and functional transfers.-met goal/d/c goal      OCCUPATIONAL THERAPY SHORT TERM GOALS   Initiated 9/7/2017 and to be accomplished within 2 Week(s)    1. Patient will perform Upper body ADLs utilizing hemitechnique with moderate assistance . 2. Patient will perform Lower body ADLs utilizing hemitechnique & adaptive equipment with maximal assistance . 3. Patient will perform toileting task with maximal assistance x 2 person assist with Fair safety to reduce falls risk. 4. Patient will perform functional transfers with Rolling Walker and moderate assistance x 2 person assist.  5. Patient will perform standing static/dynamic balance activities for improved ADL/IADL function with maximal assistance x 2 person assist and Fair balance and safety awarenes. 6. Patient will improve Barthel index scores to a tleast 20/100 to improve functional mobility. 7. Patient will perform self feeding tasks with Mod A utilizining adaptive equipment and compensatory techniques. 8. Patient will increase left sided awareness with min verbal and tactile cues for ADL performance skills and functional transfers. OCCUPATIONAL THERAPY LONG TERM GOALS   Initiated 9/7/2017 and to be accomplished within 4 Week(s)    1.  Patient will perform Upper body ADLs with/without adaptive equipment with supervision/set-up. 2. Patient will perform Lower body ADLs with/without adaptive equipment with supervision/set-up . 3. Patient will perform toileting task with supervision/set-up with Good safety to reduce falls risk. 4. Patient will perform functional transfers with Rolling Walker and supervision/set-up and Good balance and safety awareness. 5. Patient will perform standing static/dynamic activity for improved ADL/IADL function with supervision/set-up an and Good balance and safety awareness. 6. Patient will improve Barthel index score to 50/100 to improve independence with mobility. Therapist: Raghavendra Hassan 2017   [facility]  OCCUPATIONAL THERAPY DISCHARGE SUMMARY        Patient: Daniel Christine (9 y.o. female)               Date: 2017                   Attending Physician: Ayla Oviedo MD  Primary Diagnosis: HTN       Treatment Diagnosis  Treatment Diagnosis: oropharyngeal dysphagia   Treatment Diagnosis 2: L neglect         Precautions: Fall (blind L eye, monitor ortho BPs)     Medical History:   Past Medical History:   Diagnosis Date    Advance directive in chart 2016    Gastrointestinal disorder     GERD (gastroesophageal reflux disease)     GERD (gastroesophageal reflux disease) 2012    HTN (hypertension) 2012    Hypercholesterolemia     Hypertension     Pure hypercholesterolemia 2012     Past Surgical History:   Procedure Laterality Date    HX GYN      HX ORTHOPAEDIC          Reason for Discharge: [ [facility] Pretty Met   [ ]Met highest potential  [ ]Hospitalized   [ ]  Progress ceased  [ ]   [X]Other: Patient/family requesting D/C from facility. Discharge Location:  [ [facility]  [ ] [facility] [X]LTC  [ ] [facility] [X] 24 hr.  Supervision for safety      Summarize skilled services provided and significant progress attained since last daily/weekly note (include individualized treatment techniques and standardized tests):   Patient has received skilled OT services from 9/4/17 to 9/21/17 and has made Good progress towards their OT goals. Improvements noted in: self feeding performance skills, increased L sided awareness and L UE AROM/strength, UB & LB bathing and dressing and functional mobility.      Summary of education/recommendation provided to Patient/Caregivers in the following areas: Remove barriers/rugs, mobility w/Rolling Walker for functional transfers upper body dressing          Objective Data:        INITIAL ASSESSMENT DISCHARGE ASSESSMENT   COGNITIVE STATUS: COGNITIVE STATUS:   Neurologic State: Confused  Orientation Level: Oriented to person  Cognition: Follows commands  Perception: Cues to attend left visual field, Verbal, Visual, Tactile  Perseveration: No perseveration noted  Safety/Judgement: Decreased insight into deficits Mental Status  Neurologic State: Confused  Orientation Level: Oriented to person  Cognition: Poor safety awareness, Impulsive, Impaired decision making, Decreased attention/concentration, Memory loss  Perception: Cues to attend left visual field, Cues to attend to left side of body  Perseveration: No perseveration noted  Safety/Judgement: Fall prevention, Decreased awareness of environment   PAIN: PAIN:   Pain Scale 1: Numeric (0 - 10)  Pain Intensity 1: 0  Pain Onset 1: now  Pain Location 1: Head  Pain Orientation 1: Anterior  Pain Description 1: Aching  Pain Intervention(s) 1: Medication (see MAR), Distraction, Repositioned, Rest  Patient Stated Pain Goal: 0  Pain Reassessment 1: Other (comment) (no)  Additional Pain Sites:  (no) Pain Screen  Pain Scale 1: Numeric (0 - 10)  Pain Intensity 1: 0  Pain Onset 1: now  Pain Location 1: Head  Pain Orientation 1: Anterior  Pain Description 1: Aching  Pain Intervention(s) 1: Medication (see MAR), Distraction  Patient Stated Pain Goal: 0  Pain Reassessment 1: Yes  Additional Pain Sites:  (no)   BED MOBILITY BED MOBILITY   Rolling: Contact guard assistance, Minimum assistance  Supine to Sit: Moderate assistance, Additional time, Assist x1  Sit to Supine: Maximum assistance, Additional time, Assist x2  Scooting: Maximum assistance Bed Mobility  Rolling: Stand-by asssistance, Additional time  Supine to Sit: Minimum assistance, Additional time  Sit to Supine: Minimum assistance, Additional time  Scooting: Minimum assistance, Additional time   ADL SELF CARE ADL SELF CARE         ADL INTERVENTION ADL INTERVENTION   Bathing Assistance: Maximum assistance  Position Performed: Long sitting on bed Upper Body Bathing  Bathing Assistance: Stand-by assistance  Position Performed: Long sitting on bed     Upper Body Dressing Assistance  Dressing Assistance: Maximum assistance  Hospital Gown: Moderate assistance     Lower Body Bathing  Bathing Assistance: Maximum assistance  Perineal  : Maximum assistance  Position Performed: Supine (rolling onto R side utilzing bed rail)     Toileting  Toileting Assistance: Total assistance(dependent)  Bladder Hygiene: Total assistance (dependent)  Bowel Hygiene: Total assistance (dependent)  Clothing Management: Total assistance (dependent)     Grooming  Grooming Assistance: Maximum assistance  Washing Face: Stand-by assistance  Brushing Teeth: Contact guard assistance (adaptive suction toothbrush)  Brushing/Combing Hair: Minimum assistance, Contact guard assistance     Feeding  Feeding Assistance: Moderate assistance, Maximum assistance  Cutting Food:  Total assistance (dependent)  Utensil Management: Stand-by assistance  Food to Mouth: Stand-by assistance  Drink to Mouth: Contact guard assistance  Cues: Verbal cues provided, Visual cues provided  Adaptive Equipment: Built up fork, Built up spoon, Cup with lid and handle (2 HANDLE CUP AND LID REMOVED)   TRANSFERS TRANSFERS   Sit to Stand:  (witheld)  Stand to Sit: Maximum assistance  Bed to Chair: Minimum assistance, Assist x2 Functional Transfers  Sit to Stand: Minimum assistance, Assist x2  Stand to Sit: Minimum assistance, Assist x2  Bed to Chair: Minimum assistance, Assist x2   BALANCE BALANCE   Sitting: Impaired  Sitting - Static: Poor (constant support) (pt. leans heavily towards L; c/o dizziness)  Sitting - Dynamic: Poor (constant support)  Standing:  (nt due to pt.  drowsy)  Standing - Static: Poor  Standing - Dynamic : Poor Balance  Sitting: With support  Sitting - Static: Fair (occasional)  Sitting - Dynamic: Poor (constant support)  Standing: Impaired, Pull to stand, With support  Standing - Static: Poor, Constant support  Standing - Dynamic : Poor   GROSS ASSESSMENT  GROSS ASSESSMENT   AROM: Generally decreased, functional  PROM: Within functional limits  Strength: Generally decreased, functional  Coordination: Generally decreased, functional Gross Assessment  AROM: Generally decreased, functional  PROM: Within functional limits  Strength: Generally decreased, functional  Coordination: Generally decreased, functional   COORDINATION COORDINATION   Fine Motor Skills-Upper: Right Intact, Left Impaired  Gross Motor Skills-Upper: Right Intact, Left Impaired Coordination  Fine Motor Skills-Upper: Left Impaired, Right Intact  Gross Motor Skills-Upper: Left Impaired, Right Intact   VISUAL/PERCEPTUAL VISUAL/PERCEPTUAL   Tracking: Requires cues, head turns, or add eye shifts to track, Unable to track left to  midline  Visual Fields:  (L sided neglect )   Vision  Tracking: Requires cues, head turns, or add eye shifts to track, Unable to track left to  midline  Visual Fields:  (L sided neglect )     AUDITORY: AUDITORY:   Auditory Impairment: None Auditory  Auditory Impairment: None   I ADL'S:  I ADL'S:            THE BARTHEL INDEX      ACTIVITY    SCORE   FEEDING  0=unable  5=needs help cutting,spreading butter,etc., or modified diet  10= independent    5   BATHING  0=dependent  5=independent (or in shower 0   GROOMING  0=needs help  5=independent face/hair/teeth/shaving (implements provided) 0   DRESSING  0=dependent  5=needs help but can do about half unaided  10=independent(including buttons, zips,laces etc.) 0   BOWELS  0=incontinent  5=occasional accident  10=continent 0   BLADDER  0=incontinent, or catheterized and unable to manage alone  5=occasional accident  10=continent 0   TOILET USE  0=dependent  5=needs some help, but can do something alone  10=independent (on and off, dressing, wiping) 0   TRANSFER (BED TO CHAIR AND BACK)  0=unable, no sitting balance  5=major help(one or two people,physical), can sit  10=minor help(verbal or physical)  15=independent 5   MOBILITY (ON LEVEL SURFACES)  0=immobile or <50 yards  5=wheelchair independent,including corners,>50 yards  10=walkes with help of one person (verbal or physical) >50 yards  15=independent(but may use any aid; for example, stick) >50 yards 0   STAIRS  0=unable  5=needs help (verbal, physical, carrying aid)  10=independent 0               TOTAL:               10/100      Treatment :  Co-tx with P.T. To maximize functional potential w/ bed mobility and sitting balance. Patient required verbal cues for hand and feet placement. Nursing notified of patient appearing anxious. Discharge Recommendations:  [ ] Home Exercise Program                          [X] Elevated toilet seat/3 in 1 commode     [ ] Clark Cardenas      [ Rowan Chauhan                     [ ] Life Line/alert          [ ] Splint/orthotic application/schedule  [X] Grab Bars: bathroom e.g. Next to toilet and shower  [X] Home Health OT       [X] Rolling Walker  [X] hospital bed  [X] WC 18 inch, high back reclining w/c w/ anti-thrust seat cushion and elevating leg rests     Treatment session:  42 minutes.      Therapist: Telma Mcdowell      Date:9/21/2017

## 2017-09-21 NOTE — PROGRESS NOTES
FABIENNE  spoke with Laura Obrien at [facility] and arranged for 1:00pm pickup time for pt's transfer to [facility]. Fabienne will inform pt's family of the transfer time tomorrow.

## 2017-09-21 NOTE — ROUTINE PROCESS
Bedside and Verbal shift change report given to Osman Truong (oncoming nurse) by Idalia Giraldo RN (offgoing nurse). Report included the following information SBAR, Kardex and MAR. QH VISUAL CHECKS AND 24H CHART CHECKS DONE.

## 2017-09-21 NOTE — PROGRESS NOTES
Problem: Mobility Impaired (Adult and Pediatric)  Goal: *Acute Goals and Plan of Care (Insert Text)  PHYSICAL THERAPY STG GOALS :  Initiated 9/7/2017 and to be accomplished within 2 Weeks (Updated 9/20/17)     1. Patient will move from supine to sit and sit to supine , scoot up and down and roll side to side in bed with minimal assistance/contact guard assist. (Progressing; Min A with additional time and verbal, tactile and occasional manual cues)   2. Patient will transfer from bed to chair and chair to bed with minimal assistance/contact guard assist using RW. (Progressing; Min A x2 with step-by-step instructions)   3. Patient will perform sit to stand with minimal assistance/contact guard assist with Fair balance and safety awareness. (Progressing; Min A with pull-to-stand)   4. Patient will ambulate with moderate assistance for 25 feet with RW on level surfaces with 2 turns. (Progressing; 5ft with Mod A at // with close w/c follow for safety and step-by-step instructions)     PHYSICAL THERAPY STG GOALS :  Initiated 9/7/2017 and to be accomplished within 2 Weeks (Updated 9/13/17)     1. Patient will move from supine to sit and sit to supine , scoot up and down and roll side to side in bed with minimal assistance/contact guard assist. (Maintaining at Max A)   2. Patient will transfer from bed to chair and chair to bed with minimal assistance/contact guard assist using RW. (Maintaining at Total A X2)   3. Patient will perform sit to stand with minimal assistance/contact guard assist with Fair balance and safety awareness. (Maintaining at Total A x2)   4. Patient will ambulate with moderate assistance for 25 feet with RW on level surfaces with 2 turns. (NT, pt unable at this time)     PHYSICAL THERAPY LTG GOALS :  Initiated 9/7/2017 and to be accomplished within 6 Weeks    1. Patient will move from supine to sit and sit to supine , scoot up and down and roll side to side in bed with supervision/set-up.    2. Patient will transfer from bed to chair and chair to bed with supervision/set-up using RW. 3. Patient will perform sit to stand with RW supervision/set-up with Good balance and safety awareness. 4. Patient will ambulate with supervision/set-up for 100 feet with RW on level surfaces and be able to maneuver through narrow spaces and obstacles without loss of balance. 5. Patient will ascend/descend TBA. Glenn Link Physical Therapist: Zaki Loaiza, PT on 2017    [facility] PHYSICAL THERAPY DISCHARGE SUMMARY        Patient: Carlos Palfaox (12 y.o. female)                  Date: 2017    Attending Physician: Meagan Finley MD  Primary Diagnosis: HTN      Treatment Diagnosis  Treatment Diagnosis: oropharyngeal dysphagia   Treatment Diagnosis 2: L neglect         Precautions: Fall (blind L eye, monitor ortho BPs)   MEDICAL HISTORY:   Past Medical History:   Diagnosis Date    Advance directive in chart 2016    Gastrointestinal disorder      GERD (gastroesophageal reflux disease)      GERD (gastroesophageal reflux disease) 2012    HTN (hypertension) 2012    Hypercholesterolemia      Hypertension      Pure hypercholesterolemia 2012     Past Surgical History:   Procedure Laterality Date    HX GYN        HX ORTHOPAEDIC            Reason for Discharge: [ ] Goals Met   [ ]Met highest potential  [ ]    [ ] Progress ceased  [ ]Hospitalized  [X]Other: Pt/family request for early D/C from facility. Discharge Location:  [ ] Private Residence  [ ] SAL  [X] LTC  [ ]  [address] [ ]Other: 24 hr.supervision for safety. Summarize skilled services provided and significant progress attained since last daily/weekly note (include individualized treatment techniques and standardized tests): This Patient has received skilled PT services from 17  through 17 and has made Fair progress towards their PT goals.     Improvements noted in bed mobility and transfers   Summary of education/recommendation provided to Patient/Caregivers:    Pt. Education provided to use w/c, transfers and levels of assistance (provided to Son and grandson)        Objective Data:       INITIAL  ASSESSMENT DISCHARGE ASSESSMENT   COGNITIVE STATUS COGNITIVE STATUS   Neurologic State: Confused  Orientation Level: Oriented to person  Cognition: Follows commands  Perception: Cues to attend left visual field, Verbal, Visual, Tactile  Perseveration: No perseveration noted  Safety/Judgement: Decreased insight into deficits Neurologic State: Confused  Orientation Level: Oriented to person  Cognition: Poor safety awareness, Impulsive, Impaired decision making, Decreased attention/concentration, Memory loss  Perception: Cues to attend left visual field, Cues to attend to left side of body  Perseveration: No perseveration noted  Safety/Judgement: Fall prevention, Decreased awareness of environment   PAIN PAIN   Pain Scale 1: Numeric (0 - 10)  Pain Intensity 1: 0  Pain Onset 1: now  Pain Location 1: Head  Pain Orientation 1: Anterior  Pain Description 1: Aching  Pain Intervention(s) 1: Medication (see MAR), Distraction, Repositioned, Rest  Patient Stated Pain Goal: 0  Pain Reassessment 1: Other (comment) (no)  Additional Pain Sites:  (no) Pain Scale 1: Numeric (0 - 10)  Pain Intensity 1: 0  Pain Onset 1: now  Pain Location 1: Head  Pain Orientation 1: Anterior  Pain Description 1: Aching  Pain Intervention(s) 1: Medication (see MAR), Distraction  Patient Stated Pain Goal: 0  Pain Reassessment 1: Yes  Additional Pain Sites:  (no)   GROSS ASSESSMENT GROSS ASSESSMENT   AROM: Generally decreased, functional  PROM: Within functional limits  Strength: Generally decreased, functional  Coordination: Generally decreased, functional AROM: Generally decreased, functional  PROM: Within functional limits  Strength: Generally decreased, functional  Coordination: Generally decreased, functional   BED MOBILITY BED MOBILITY   Rolling: Contact guard assistance, Minimum assistance  Supine to Sit: Moderate assistance, Additional time, Assist x1  Sit to Supine: Maximum assistance, Additional time, Assist x2  Scooting: Maximum assistance Rolling: Stand-by asssistance, Additional time  Supine to Sit: Minimum assistance, Additional time  Sit to Supine: Minimum assistance, Additional time  Scooting: Minimum assistance, Additional time   GAIT GAIT   Base of Support: Center of gravity altered  Speed/Alfreda: Delayed, Pace decreased (<100 feet/min), Shuffled  Step Length: Right shortened, Left shortened  Gait Abnormalities: Decreased step clearance, Step to gait, Shuffling gait  Ambulation - Level of Assistance: Moderate assistance, Additional time, Assist x1  Distance (ft): 5 Feet (ft)  Assistive Device: Other (comment) (at // with B UE support )  Interventions: Safety awareness training, Verbal cues, Tactile cues, Manual cues, Visual/Demos Base of Support: Center of gravity altered  Speed/Alfreda: Delayed, Pace decreased (<100 feet/min), Shuffled  Step Length: Right shortened, Left shortened  Gait Abnormalities: Decreased step clearance, Step to gait, Shuffling gait  Ambulation - Level of Assistance:  Moderate assistance, Additional time, Assist x1  Distance (ft): 5 Feet (ft)  Assistive Device: Other (comment) (at // with B UE support )  Interventions: Safety awareness training, Verbal cues, Tactile cues, Manual cues, Visual/Demos    (NT; pt. unable to tolerate)  (NT; pt. unable to tolerate)                     TRANSFERS TRANSFERS   Sit to Stand:  (witheld)  Stand to Sit: Maximum assistance  Bed to Chair: Minimum assistance, Assist x2 Sit to Stand: Minimum assistance, Assist x2  Stand to Sit: Minimum assistance, Assist x2  Bed to Chair: Minimum assistance, Assist x2   BALANCE BALANCE   Sitting: Impaired  Sitting - Static: Poor (constant support) (pt. leans heavily towards L; c/o dizziness)  Sitting - Dynamic: Poor (constant support)  Standing:  (nt due to pt. drowsy)  Standing - Static: Poor  Standing - Dynamic : Poor Sitting: With support  Sitting - Static: Fair (occasional)  Sitting - Dynamic: Poor (constant support)  Standing: Impaired, Pull to stand, With support  Standing - Static: Poor, Constant support  Standing - Dynamic : Poor   WHEELCHAIR MOBILITY/MGMT WHEELCHAIR MOBILITY/MGMT           With 0 turns   Visual/Perceptual       Tracking: Requires cues, head turns, or add eye shifts to track, Unable to track left to  midline  Visual Fields:  (L sided neglect )    Auditory:   Auditory  Auditory Impairment: None             Visual/Perceptual       Tracking: Requires cues, head turns, or add eye shifts to track, Unable to track left to  midline  Visual Fields:  (L sided neglect )    Auditory:   Auditory  Auditory Impairment: None             Activity Tolerance:  Fair                              Treatment:   Partial co-treat with OT to maximize functional gains with bed mobility and seated balance activities due to pt's poor activity tolerance and seated balance. Pt able to complete supine<>sit with Min A today and verbal, tactile and manual cues for hand placement and use of bed rail. Pt sat EOB for ~15' with prop sitting and SBA/CGA or pt would hold onto bed rail for support. Pt appeared more anxious today and required additional cues for safety. Seated EOB LAQ, marches 2x5 with verbal and visual cues with CGA to maintain balance. Supine heel slides 2x5. Bed mobility rendered to include rolling L<>R and for bridging for ease of scooting in bed. Discharge Recommendations:  [X]  Home Exercise Program                                   [ ]  Home Health PT   [ ]  Remove throw rugs/clear environmental barriers                   [X]  Assistance with: all OOB tasks                                     [ ]  Ambulation Device: (Non-ambulatory at this time.  Gait training provided at parallel bars)  [ ]  Steps (NT)   [X]  Wheelchair 18 inch high-back with comfort support pro cushion (angled to minimize risk of sliding forward)  [ ]  Life Line/alert   [ ]  WC  Ramp        Treatment minutes: 38 minutes.      Therapist :  Nash Dukes PTA            Date:9/21/2017

## 2017-09-21 NOTE — PROGRESS NOTES
SW reviewed the medicare notice with pt's son re: request for pt's d/c tomorrow. Pt's son signed the notice and was given a copy.  SW informed pt of the transfer  time tomorrow at 1:00pm.

## 2017-09-21 NOTE — DISCHARGE SUMMARY
[facility] SUMMARY    Name:  Sherren Beal  MR#:  [id number]  :  1925  Account #:  [de-identified]  Date of Adm:  2017  Date of Transfer:  2017      FINAL DIAGNOSES  1. Cerebrovascular accident with left hemiparesis and left visual field  cut.  2. Hypertension. 3. Gastroesophageal reflux disease. 4. Hypercholesterolemia by history. 5. Dementia, mild to moderate. DISCHARGE MEDICATIONS  Include:  1. Tylenol 650 mg 2 tablets every 4 hours as needed. 2. Aspirin 325 mg a day. 3. Lipitor 80 mg a day. 4. Plavix 75 mg a day. 5. Lisinopril 5 mg. The patient had some elevations in blood sugars during the hospital  stay; however, in review those blood sugars are all within normal range  and this will not be continued as an outpatient. She had not been  receiving her blood pressure medications, which now includes lisinopril  5 mg a day, and this is being started at discharge as her systolics are  running between 140-150, which was not the case upon arrival.    She was admitted to the hospital with history of a TIA in the past with  complaints of right facial pain thought to be sinus, but not clear. CT of  the head showed an acute versus subacute right parietal occipital lobe  infarct and when she arrived, she had rather dense left hemiparesis  and left visual field cut. Her p.o. intake was marginal and for at least on  one occasion had to be supplemented with IV fluids for an elevation in  her BUN, which will be reviewed below. She was originally continued  on hydrochlorothiazide and Zestril, these were discontinued with only  the Zestril to be restarted. The family was charged with increasing her  oral intake, and actually they were able to do that. She is subsequently  significantly improved and is much more alert than she had been. Her  gaze preference seems to have resolved, and her eyes have returned  to the midline.  Her left hemiparesis is improving as well, and at this  time she is being transferred to [facility] as her likely  rehab course will be prolonged. She had labs that included a  hemoglobin of 11.5, white count on the 19th was 11.5, BUN was 35,  and this is pretty well her normal range with creatinine of 1.6 to 2.8. At this time, the patient is being transferred to a facility that she can  remain at for longer period of time and continue physical therapy. She  had been seen by Dr. Angelica Powell who had recommended the  aspirin only; however, she came to us on Plavix as well, and is  normally followed by Dr. Chasidy Ovalle, who is her primary care doctor.         Ruth Ann Connor MD    [facility]  D:  09/21/2017   11:27  T:  09/21/2017   11:44  Job #:  [id number]

## 2017-09-21 NOTE — PROGRESS NOTES
completed follow up visit with patient and family member and Spiritual assessment of patient was done.   Chaplains will continue to follow and will provide pastoral care on an as needed/requested basis    Chaplain Javier Tanner   Board Certified [facility]   [phone]

## 2017-09-21 NOTE — PROGRESS NOTES
Fabienne faxed copy of scripts and copy of transfer summary to [facility] . FABIENNE left message for pt's son informing him of the transfer time of 1:00pm tomorrow. FABIENNE requested that pt's son call SW back re:arranging a time to meet.  Fabienne spoke with pt's son and he agreed to meet with SW tomorrow after breakfast.

## 2017-09-21 NOTE — PROGRESS NOTES
Received new orders for skilled physical therapy services. Pt already on PT caseload. Will continue skilled physical therapy services. Thank you,   Charlotte Ramos.  Cyndee Mcmillan, DIORT

## 2017-09-22 NOTE — ROUTINE PROCESS
Discharged to [facility] via medical transport with son in satisfactory condition. With dc papers.

## 2017-09-22 NOTE — ROUTINE PROCESS
Bedside and Verbal shift change report given to MARJAN Rosenberg (oncoming nurse) by HELLEN Morales RN (offgoing nurse). Report included the following information SBAR, Kardex and MAR.

## 2017-09-26 NOTE — PROGRESS NOTES
Nurse Navigator  POST HOSPITALIZATION NOTE  -09/03/17 Admitted at [facility] for CVA  -09/06/17 Discharged to SNF [facility]   -09/22/17 Discharged to SNF [facility] ([phone]). Pt's identity verified with staff. I was transferred to , Shannon Sinclair, for an update on patient. Unable to reach. Left message on voicemail with contact info              This note will not be viewable in [address]

## 2017-09-27 NOTE — DISCHARGE SUMMARY
[facility] SUMMARY    Name:  Beth Alvarez  MR#:  [id number]  :  1925  Account #:  [de-identified]  Date of Adm:  2017  Date of Transfer:  2017      ADDENDUM TO Reyna Montoya #[id number]; 2017    The patient's original transfer summary was dictated on 2017  and she was transferred on 2017. The transfer summary was  dictated on the day prior to transfer, which I understand that is a  normal allowable thing to do, yet I am being asked to do an addendum  for this patient that suffered no change in situation or condition prior to  her transfer.         Jenny Gonzalez MD    [facility] / [facility]  D:  2017   11:10  T:  2017   13:45  Job #:  [id number]

## 2017-10-11 NOTE — Clinical Note
Admitted to hospital 10/8 to [facility] for gi bleed. She is a DNR/DNI but family still wants her sent to hospital despite multiple conversations. Was to transition to LTC 10/14/2017.

## 2017-10-16 NOTE — PROGRESS NOTES
Community Care Team Documentation for Patient in [facility]     Patient discharged from [facility] 9/3/2017 - 9/6/2017 to [facility] 9/6/2017 - 9/22/2017. Transferred to [facility] for continued skilled care 9/22/2017  . Hospital Discharge diagnosis:  CVA. SNF Attending Provider:      Anticipated discharge date from SNF:  TBD      PCP : Pamela Mcnamara DO    Nurse Navigator: Kelsi Mcnally    High Risk            22       Total Score        3 Has Seen PCP in Last 6 Months (Yes=3, No=0)    19 Charlson Comorbidity Score (Age + Comorbid Conditions)        Criteria that do not apply:    . Living with Significant Other. Assisted Living. LTAC. SNF. or   Rehab    Patient Length of Stay (>5 days = 3)    IP Visits Last 12 Months (1-3=4, 4=9, >4=11)    Pt.  Coverage (Medicare=5 , Medicaid, or Self-Pay=4)

## 2017-10-16 NOTE — PROGRESS NOTES
Community Care Team Documentation for Patient in [facility]     Patient discharged from [address] 9/3/2017 - 9/6/2017 to [facility], [address] 9/6/2017 - 9/22/2017. Transferred to [facility] for continued skilled care 9/22/2017  . Hospital Discharge diagnosis:  CVA. SNF Attending Provider:      Anticipated discharge date from SNF:10/14/2017    PCP : Patricia Mcgowan., DO    Nurse Navigator: Enrique Yuen team call completed, updates provided by facility. Admitted to hospital 10/8 to [facility] for gi bleed. She is a DNR/DNI but family still wants her sent to hospital despite multiple conversations. High Risk            22       Total Score        3 Has Seen PCP in Last 6 Months (Yes=3, No=0)    19 Charlson Comorbidity Score (Age + Comorbid Conditions)        Criteria that do not apply:    . Living with Significant Other. Assisted Living. LTAC. SNF. or   Rehab    Patient Length of Stay (>5 days = 3)    IP Visits Last 12 Months (1-3=4, 4=9, >4=11)    Pt.  Coverage (Medicare=5 , Medicaid, or Self-Pay=4)

## 2017-10-16 NOTE — PROGRESS NOTES
Community Care Team Documentation for Patient in [facility]     Patient discharged from [facility] 9/3/2017 - 9/6/2017 to [facility], [address] 9/6/2017 - 9/22/2017. Transferred to [facility] for continued skilled care 9/22/2017  . Hospital Discharge diagnosis:  CVA. SNF Attending Provider:      Anticipated discharge date from SNF:10/14/2017    PCP : Felicia Kathleen., DO    Nurse Navigator: Jimi Vieira team call completed, updates provided by facility. Purred diet, max assist. Is skilled at this time, will transition to LTC. 10/14, skilled for SP. Cognition     High Risk            22       Total Score        3 Has Seen PCP in Last 6 Months (Yes=3, No=0)    19 Charlson Comorbidity Score (Age + Comorbid Conditions)        Criteria that do not apply:    . Living with Significant Other. Assisted Living. LTAC. SNF. or   Rehab    Patient Length of Stay (>5 days = 3)    IP Visits Last 12 Months (1-3=4, 4=9, >4=11)    Pt.  Coverage (Medicare=5 , Medicaid, or Self-Pay=4)

## 2018-03-30 NOTE — ROUTINE PROCESS
Bedside and Verbal shift change report given to Counts include 234 beds at the [facility] nurse) by Vickie Elizabeth RN (offgoing nurse). Report included the following information SBAR, Kardex and MAR. Q VISUAL CHECKS DONE. Pt called wanting to know the status of her gi appointment

## 2020-01-23 NOTE — PROGRESS NOTES
GIM     Patient: Eun Carney MRN: [id number]  CSN: [id number]    YOB: 1925  Age: 80 y.o. Sex: female    DOA: 9/6/2017 LOS:  LOS: 12 days                    Subjective:     Pt better and gaze preference improved. HA resolved and oral intake better but still need assistance. Discussed with son    Objective:      Visit Vitals    /78    Pulse 82    Temp 97.8 °F (36.6 °C)    Resp 20    Ht 5' 4\" (1.626 m)    Wt 71.2 kg (157 lb)    SpO2 95%    Breastfeeding No    BMI 26.95 kg/m2       Physical Exam:  Chest clear  Cor rr  abd soft  No edema    Intake and Output:  Current Shift:     Last three shifts:       No results found for this or any previous visit (from the past 24 hour(s)).     Current Facility-Administered Medications   Medication Dose Route Frequency    morphine (ROXANOL) 100 mg/5 mL (20 mg/mL) concentrated solution 2.6 mg  2.6 mg Oral Q4H PRN    [facility] ANESTHESIA   Other PRN    [facility] EMERGENCY/STAT BOX   Other PRN    acetaminophen (TYLENOL) tablet 650 mg  650 mg Oral Q4H PRN    aspirin (ASPIRIN) tablet 325 mg  325 mg Oral DAILY    atorvastatin (LIPITOR) tablet 80 mg  80 mg Oral QHS    clopidogrel (PLAVIX) tablet 75 mg  75 mg Oral DAILY    glucose chewable tablet 16 g  4 Tab Oral PRN    glucagon (GLUCAGEN) injection 1 mg  1 mg IntraMUSCular PRN    dextrose (D50W) injection syrg 12.5-25 g  25-50 mL IntraVENous PRN       Lab Results   Component Value Date/Time    Glucose 110 09/15/2017 05:30 AM    Glucose 93 09/13/2017 04:32 AM    Glucose 129 09/12/2017 07:00 AM    Glucose 127 09/11/2017 02:20 PM    Glucose 105 09/10/2017 04:10 AM        Assessment/Plan     Active Problems:    Debility (9/7/2017)      Altered mental status (9/7/2017)      CKD (chronic kidney disease) stage 3, GFR 30-59 ml/min (9/7/2017)        Marlys Hope MD  9/18/2017, 12:55 PM [address] Exp Problem Focused - Mod. Complex

## 2023-05-17 NOTE — ROUTINE PROCESS
Bedside and Verbal shift change report given to A Jerilyn SMITHN(oncoming nurse) by Luis Carlos Marcial RN (offgoing nurse). Report included the following information SBAR, Kardex and MAR.  24 hour chart check completed, pt visually checked q 1 hour by nursing staff Mucosal Advancement Flap Text: Given the location of the defect, shape of the defect and the proximity to free margins a mucosal advancement flap was deemed most appropriate. Incisions were made with a 15 blade scalpel in the appropriate fashion along the cutaneous vermilion border and the mucosal lip. The remaining actinically damaged mucosal tissue was excised.  The mucosal advancement flap was then elevated to the gingival sulcus with care taken to preserve the neurovascular structures and advanced into the primary defect. Care was taken to ensure that precise realignment of the vermilion border was achieved.

## 2024-05-19 NOTE — ROUTINE PROCESS
Bedside and verbal shift report given to ZACHERY Lala LPN (oncoming nurse) by FELA Vargas LPN (off going nurse). Report included SBAR, MAR and Kardex. Hourly rounds complete. 1 Hour(s) 1 Minute(s)